# Patient Record
Sex: FEMALE | Race: WHITE | NOT HISPANIC OR LATINO | Employment: FULL TIME | URBAN - METROPOLITAN AREA
[De-identification: names, ages, dates, MRNs, and addresses within clinical notes are randomized per-mention and may not be internally consistent; named-entity substitution may affect disease eponyms.]

---

## 2017-01-05 ENCOUNTER — LAB REQUISITION (OUTPATIENT)
Dept: LAB | Facility: HOSPITAL | Age: 47
End: 2017-01-05
Payer: COMMERCIAL

## 2017-01-05 DIAGNOSIS — E55.9 VITAMIN D DEFICIENCY: ICD-10-CM

## 2017-01-05 LAB — 25(OH)D3 SERPL-MCNC: 50 NG/ML (ref 30–100)

## 2017-01-05 PROCEDURE — 82306 VITAMIN D 25 HYDROXY: CPT | Performed by: INTERNAL MEDICINE

## 2017-01-26 ENCOUNTER — GENERIC CONVERSION - ENCOUNTER (OUTPATIENT)
Dept: OTHER | Facility: OTHER | Age: 47
End: 2017-01-26

## 2017-04-05 ENCOUNTER — ALLSCRIPTS OFFICE VISIT (OUTPATIENT)
Dept: OTHER | Facility: OTHER | Age: 47
End: 2017-04-05

## 2017-04-10 ENCOUNTER — HOSPITAL ENCOUNTER (OUTPATIENT)
Dept: RADIOLOGY | Age: 47
Discharge: HOME | End: 2017-04-10
Payer: COMMERCIAL

## 2017-04-10 DIAGNOSIS — Z12.31 ENCOUNTER FOR SCREENING MAMMOGRAM FOR MALIGNANT NEOPLASM OF BREAST: ICD-10-CM

## 2017-04-10 PROCEDURE — G0202 SCR MAMMO BI INCL CAD: HCPCS

## 2017-05-12 ENCOUNTER — ALLSCRIPTS OFFICE VISIT (OUTPATIENT)
Dept: OTHER | Facility: OTHER | Age: 47
End: 2017-05-12

## 2017-07-07 ENCOUNTER — ALLSCRIPTS OFFICE VISIT (OUTPATIENT)
Dept: OTHER | Facility: OTHER | Age: 47
End: 2017-07-07

## 2017-07-19 RX ORDER — B-COMPLEX WITH VITAMIN C
TABLET ORAL
COMMUNITY
End: 2021-11-23 | Stop reason: CLARIF

## 2017-07-19 RX ORDER — KETOROLAC TROMETHAMINE 10 MG/1
10 TABLET, FILM COATED ORAL EVERY 6 HOURS PRN
COMMUNITY
End: 2017-10-28 | Stop reason: ALTCHOICE

## 2017-07-19 RX ORDER — AZELASTINE 1 MG/ML
1 SPRAY, METERED NASAL 2 TIMES DAILY
COMMUNITY
End: 2019-04-10

## 2017-07-21 ENCOUNTER — LAB REQUISITION (OUTPATIENT)
Dept: LAB | Facility: HOSPITAL | Age: 47
End: 2017-07-21
Payer: COMMERCIAL

## 2017-07-21 DIAGNOSIS — E55.9 VITAMIN D DEFICIENCY: ICD-10-CM

## 2017-07-21 LAB — 25(OH)D3 SERPL-MCNC: 63.6 NG/ML (ref 30–100)

## 2017-07-21 PROCEDURE — 82306 VITAMIN D 25 HYDROXY: CPT | Performed by: INTERNAL MEDICINE

## 2017-08-03 ENCOUNTER — HOSPITAL ENCOUNTER (OUTPATIENT)
Facility: HOSPITAL | Age: 47
Setting detail: OUTPATIENT SURGERY
Discharge: HOME | End: 2017-08-03
Attending: ORTHOPAEDIC SURGERY | Admitting: ORTHOPAEDIC SURGERY
Payer: COMMERCIAL

## 2017-08-03 VITALS
SYSTOLIC BLOOD PRESSURE: 131 MMHG | BODY MASS INDEX: 25.66 KG/M2 | DIASTOLIC BLOOD PRESSURE: 74 MMHG | WEIGHT: 154 LBS | OXYGEN SATURATION: 100 % | HEART RATE: 60 BPM | TEMPERATURE: 98.5 F | RESPIRATION RATE: 16 BRPM | HEIGHT: 65 IN

## 2017-08-03 PROBLEM — M67.442 GANGLION, LEFT HAND: Status: ACTIVE | Noted: 2017-08-03

## 2017-08-03 RX ORDER — HYDROCODONE BITARTRATE AND ACETAMINOPHEN 5; 325 MG/1; MG/1
1 TABLET ORAL EVERY 6 HOURS PRN
Qty: 10 TABLET | Refills: 0 | Status: SHIPPED | OUTPATIENT
Start: 2017-08-03 | End: 2017-10-28 | Stop reason: ALTCHOICE

## 2017-08-03 RX ORDER — MAGNESIUM HYDROXIDE 1200 MG/15ML
LIQUID ORAL AS NEEDED
Status: DISCONTINUED | OUTPATIENT
Start: 2017-08-03 | End: 2017-08-03 | Stop reason: HOSPADM

## 2017-08-03 RX ORDER — LIDOCAINE HYDROCHLORIDE AND EPINEPHRINE 10; 10 MG/ML; UG/ML
INJECTION, SOLUTION INFILTRATION; PERINEURAL AS NEEDED
Status: DISCONTINUED | OUTPATIENT
Start: 2017-08-03 | End: 2017-08-03 | Stop reason: HOSPADM

## 2017-08-03 RX ORDER — LIDOCAINE HYDROCHLORIDE AND EPINEPHRINE 10; 10 MG/ML; UG/ML
10 INJECTION, SOLUTION INFILTRATION; PERINEURAL ONCE
Status: COMPLETED | OUTPATIENT
Start: 2017-08-03 | End: 2017-08-03

## 2017-08-03 RX ADMIN — LIDOCAINE HYDROCHLORIDE,EPINEPHRINE BITARTRATE 10 ML: 10; .01 INJECTION, SOLUTION INFILTRATION; PERINEURAL at 09:46

## 2017-08-11 ENCOUNTER — TRANSCRIBE ORDERS (OUTPATIENT)
Dept: LAB | Facility: CLINIC | Age: 47
End: 2017-08-11

## 2017-08-11 ENCOUNTER — ALLSCRIPTS OFFICE VISIT (OUTPATIENT)
Dept: OTHER | Facility: OTHER | Age: 47
End: 2017-08-11

## 2017-08-11 ENCOUNTER — TRANSCRIBE ORDERS (OUTPATIENT)
Dept: LAB | Facility: HOSPITAL | Age: 47
End: 2017-08-11

## 2017-08-11 ENCOUNTER — APPOINTMENT (OUTPATIENT)
Dept: LAB | Facility: HOSPITAL | Age: 47
End: 2017-08-11
Payer: COMMERCIAL

## 2017-08-11 DIAGNOSIS — R19.7 DIARRHEA, UNSPECIFIED TYPE: Primary | ICD-10-CM

## 2017-08-11 DIAGNOSIS — R19.7 DIARRHEA, UNSPECIFIED TYPE: ICD-10-CM

## 2017-08-11 PROCEDURE — 87015 SPECIMEN INFECT AGNT CONCNTJ: CPT

## 2017-08-11 PROCEDURE — 87045 FECES CULTURE AEROBIC BACT: CPT

## 2017-08-11 PROCEDURE — 87899 AGENT NOS ASSAY W/OPTIC: CPT

## 2017-08-11 PROCEDURE — 87046 STOOL CULTR AEROBIC BACT EA: CPT

## 2017-08-11 PROCEDURE — 87493 C DIFF AMPLIFIED PROBE: CPT

## 2017-08-12 LAB — C DIFF TOX GENS STL QL NAA+PROBE: NORMAL

## 2017-08-13 LAB
BACTERIA STL CULT: NORMAL
BACTERIA STL CULT: NORMAL

## 2017-09-24 ENCOUNTER — TRANSCRIBE ORDERS (OUTPATIENT)
Dept: LAB | Facility: HOSPITAL | Age: 47
End: 2017-09-24

## 2017-09-24 ENCOUNTER — APPOINTMENT (OUTPATIENT)
Dept: LAB | Facility: HOSPITAL | Age: 47
End: 2017-09-24
Attending: INTERNAL MEDICINE
Payer: COMMERCIAL

## 2017-09-24 DIAGNOSIS — R19.7 DIARRHEA, UNSPECIFIED TYPE: ICD-10-CM

## 2017-09-24 DIAGNOSIS — R19.7 DIARRHEA, UNSPECIFIED TYPE: Primary | ICD-10-CM

## 2017-09-24 LAB
HEMOCCULT SP1 STL QL: NEGATIVE
HEMOCCULT SP2 STL QL: NEGATIVE
HEMOCCULT SP3 STL QL: NEGATIVE

## 2017-09-24 PROCEDURE — 36415 COLL VENOUS BLD VENIPUNCTURE: CPT

## 2017-09-24 PROCEDURE — 82705 FATS/LIPIDS FECES QUAL: CPT

## 2017-09-24 PROCEDURE — 83993 ASSAY FOR CALPROTECTIN FECAL: CPT

## 2017-09-24 PROCEDURE — 89055 LEUKOCYTE ASSESSMENT FECAL: CPT

## 2017-09-24 PROCEDURE — 82270 OCCULT BLOOD FECES: CPT

## 2017-09-27 LAB — WBC SPEC QL GRAM STN: NORMAL

## 2017-09-29 LAB
FAT STL QL: NORMAL
NEUTRAL FAT STL QL: NORMAL

## 2017-10-02 LAB — CALPROTECTIN STL-MCNT: <16 UG/G (ref 0–120)

## 2017-10-09 ENCOUNTER — ALLSCRIPTS OFFICE VISIT (OUTPATIENT)
Dept: OTHER | Facility: OTHER | Age: 47
End: 2017-10-09

## 2017-10-09 DIAGNOSIS — Z47.89 ENCOUNTER FOR OTHER ORTHOPEDIC AFTERCARE (CODE): ICD-10-CM

## 2017-10-09 DIAGNOSIS — J30.9 ALLERGIC RHINITIS: ICD-10-CM

## 2017-10-10 NOTE — PROGRESS NOTES
Assessment  1  Aftercare following surgery of the musculoskeletal system (V58 78) (Z47 89)    Plan   · Continue with our present treatment plan ; Status:Complete;   Done: [id number]   · Follow-up visit in 6 weeks Evaluation and Treatment  Follow-up  Status: Complete  Done:  00NBQ4009   · *1 - [facility] Referral Co-Management  Formal therapy 2-3x/wk to work on L  wrist s/p dorsal wrist ganglion excision  Pt with scar tissue that is contributing to extensor  tendon/muscle inflammation of ring and small fingers  Please work on ROM, stretching,  massage, heat and scar tissue management  Please also include therapy for ring finger  trigger finger and nighttime splint (if pt interested)  Please include HEP  Thanks  Status:  Complete  Done: 95VCS1241  Care Summary provided  : Yes    Discussion/Summary    Patient has developed scar tissue over the area of her incision and it does appear to have caused inflammation of her extensor tendons radiating up into the muscle of the ring and small finger  For this, formal therapy was recommended and patient agrees  In addition, patient was found to have the beginnings of a slight trigger finger of the ring finger with a tender nodule  Therapy, steroid injections and surgery were discussed briefly today  At this time, patient would like to 1st initiate therapy  This will be added to her program  Follow up in 6 weeks for re-evaluation  Chief Complaint  1  Wrist Pain    Post-Op  HPI: The past medical history, surgical history, family history, social history, medications, allergies, and review of systems have been read and reviewed on the chart and have been updated  Post-Op UE:   Left side, status post excision of a ganglion cyst on 8/3/17   HPI: The patient reports no fevers,-no chills,-no numbness,-no excessive pain,-no swelling,-no nausea-and-no stiffness   Pt with worsening discomfort into the ring and small fingers, describes pain on dorsal hand/fingers that can radiate up the lateral forearm  Also with pain along the flexor surface of these fingers  Denies clicking catching  PE: The surgical incision site was healed-and-clean, dry and intact  The surgical incision site demonstrates no warmth,-no induration,-no erythema-and-no ecchymosis-  Pt with palpable scar tissue under incision from ganglion removal  This is ttp  Pt able to flex and extend all fingers, but does describe pain with the ring and small fingers  Ttp along extensor tendons of ring/small finger without edema  This continues to the proximal muscle belly of extensors in the forearm  Pt also with a palpable nodule of A1 pulley that is ttp on the ring finger only  Crepitation felt on exam, but no clicking  ROM is as expected and progressing well  2+ radial artery pulse on the operative side  Capillary refill is < 2 seconds Peripheral neurovascular exam reveals sensation intact-and-motor intact  Assessment: Post-op, the patient is progressing slowly, but-has excellent pain control-and-no signs of infection  Preoperative symptoms resolved  Plan: Activity Restrictions: Full use without restrictions  Review of Systems    Constitutional: No fever, no chills, feels well, no tiredness, no recent weight gain or loss  Musculoskeletal: as noted in HPI  Active Problems  1  Acute URI (465 9) (J06 9)   2  Aftercare following surgery of the musculoskeletal system (V58 78) (Z47 89)   3  Allergic rhinitis (477 9) (J30 9)   4  Atypical ductal hyperplasia of breast (610 8) (N60 99)   5  Cervical disc displacement (722 0) (M50 20)   6  Cervical radiculopathy (723 4) (M54 12)   7  Cervicalgia (723 1) (M54 2)   8  Chronic migraine without aura, not intractable, without status migrainosus (346 70)   (G43 709)   9  Common migraine without aura (346 10) (G43 009)   10  Encounter for breast cancer screening other than mammogram (V76 10) (Z12 39)   11   Encounter for screening mammogram for malignant neoplasm of breast (V76 12)    (Z12 31)   12  Hashimoto's thyroiditis (245 2) (E06 3)   13  Headache (784 0) (R51)   14  Headache, migraine, intractable (346 91) (G43 919)   15  Herniated cervical disc (722 0) (M50 20)   16  Hypertension (401 9) (I10)   17  Hypothyroidism (244 9) (E03 9)   18  Kidney stone (592 0) (N20 0)   19  Malignant neoplasm without specification of site (199 1) (C80 1)   20  Myofascial pain syndrome (729 1) (M79 1)   21  Nausea (787 02) (R11 0)   22  Need for influenza vaccination (V04 81) (Z23)   23  Nontoxic single thyroid nodule (241 0) (E04 1)   24  Sinusitis (473 9) (J32 9)   25  Tension type headache (339 10) (G44 209)   26  Thyroiditis (245 9) (E06 9)   27  Thyromegaly (240 9) (E04 9)    Current Meds   1  Aspirin 81 MG TABS; Take 1 tablet daily; Therapy: (Recorded:05Apr2017) to Recorded   2  Azelastine HCl - 0 1 % Nasal Solution; INSTILL 2 SPRAYS IN EACH NOSTRIL ONCE   DAILY; Therapy: 56GEM5045 to (Last Rx:10Nov2016)  Requested for: 02LQT1869 Ordered   3  B2 TABS; Take 1 tablet daily Recorded   4  Co Q-10 100 MG Oral Capsule; Take as directed Recorded   5  Dexamethasone 2 MG Oral Tablet; 1 at the onset of headache, max 5/month; Therapy: 91INA9667 to (Evaluate:17Aug2017)  Requested for: 49ZHP3677; Last   Rx:09Imv2425 Ordered   6  Ketorolac Tromethamine 10 MG Oral Tablet; TID prn, Take at onset of headaches; Therapy: 34YGP3850 to (Evaluate:24Jun2016)  Requested for: 48ILQ8465; Last   Rx:38Hxr3202 Ordered   7  Levothyroxine Sodium 25 MCG Oral Tablet; Take 1 tablet daily; Therapy: 86AQX8737 to (Last Rx:75Hls3001)  Requested for: 10VJG0991 Ordered   8  Magnesium 500 MG Oral Capsule; Take 1 capsule twice daily; Therapy: (Recorded:51Vhd5231) to Recorded   9  Potassium Citrate ER TBCR; 99MG TAKE 1 TAB DAILY; Therapy: (Recorded:03Awo2337) to Recorded   10  Tamoxifen Citrate 20 MG Oral Tablet; TAKE 1 TABLET DAILY; Therapy: 00BOR4868 to Recorded   11   Topiramate 100 MG Oral Tablet; TAKE 1 TABLET AT BEDTIME; Therapy: 31SDF0496 to (Evaluate:02Buc2245)  Requested for: 03Sep2017; Last    Rx:45Ooy7231 Ordered   12  Vitamin D3 CAPS;     Therapy: (Recorded:09Oct2017) to Recorded    Allergies  1  levofloxacin    Vitals  Signs   Heart Rate: 66  Systolic: 164  Diastolic: 83  Height: 5 ft 5 in  Weight: 154 lb 6 oz  BMI Calculated: 25 69  BSA Calculated: 1 77    Future Appointments    Date/Time Provider Specialty Site   11/20/2017 03:40 PM Champ Ndiaye [facility] Orthopedic Surgery [facility]   04/05/2018 09:30 AM Felipe Marte [facility] Neurology [facility] [place] [facility]     Signatures   Electronically signed by : Ida Mccoy [facility]; Oct  9 2017 11:09AM EST                       (Author)    Electronically signed by : ALBERTO Castro ; Oct  9 2017  4:26PM EST

## 2017-10-17 ENCOUNTER — APPOINTMENT (OUTPATIENT)
Dept: OCCUPATIONAL THERAPY | Facility: CLINIC | Age: 47
End: 2017-10-17
Payer: COMMERCIAL

## 2017-10-17 DIAGNOSIS — Z47.89 ENCOUNTER FOR OTHER ORTHOPEDIC AFTERCARE (CODE): ICD-10-CM

## 2017-10-17 DIAGNOSIS — J30.9 ALLERGIC RHINITIS: ICD-10-CM

## 2017-10-17 PROCEDURE — 97140 MANUAL THERAPY 1/> REGIONS: CPT

## 2017-10-17 PROCEDURE — G8991 OTHER PT/OT GOAL STATUS: HCPCS

## 2017-10-17 PROCEDURE — 97110 THERAPEUTIC EXERCISES: CPT

## 2017-10-17 PROCEDURE — 97165 OT EVAL LOW COMPLEX 30 MIN: CPT

## 2017-10-17 PROCEDURE — G8990 OTHER PT/OT CURRENT STATUS: HCPCS

## 2017-10-23 ENCOUNTER — APPOINTMENT (OUTPATIENT)
Dept: OCCUPATIONAL THERAPY | Facility: CLINIC | Age: 47
End: 2017-10-23
Payer: COMMERCIAL

## 2017-10-23 PROCEDURE — 97010 HOT OR COLD PACKS THERAPY: CPT

## 2017-10-23 PROCEDURE — 97110 THERAPEUTIC EXERCISES: CPT

## 2017-10-23 PROCEDURE — 97140 MANUAL THERAPY 1/> REGIONS: CPT

## 2017-10-24 ENCOUNTER — ALLSCRIPTS OFFICE VISIT (OUTPATIENT)
Dept: OTHER | Facility: OTHER | Age: 47
End: 2017-10-24

## 2017-10-25 ENCOUNTER — APPOINTMENT (OUTPATIENT)
Dept: OCCUPATIONAL THERAPY | Facility: CLINIC | Age: 47
End: 2017-10-25
Payer: COMMERCIAL

## 2017-10-25 PROCEDURE — 97110 THERAPEUTIC EXERCISES: CPT

## 2017-10-25 PROCEDURE — 97140 MANUAL THERAPY 1/> REGIONS: CPT

## 2017-10-25 NOTE — PROGRESS NOTES
Assessment  1  Infected pierced ear (958 3) (S01 339A,L08 9)    Plan  Headache, migraine, intractable    · Dexamethasone 2 MG Oral Tablet; 1 at the onset of headache, max 5/month  Infected pierced ear    · Cephalexin 500 MG Oral Capsule; TAKE 1 CAPSULE 3 TIMES DAILY   · PredniSONE 5 MG Oral Tablet; TAKE 3 TABLET Twice daily for 2 days,t then 2 bid  for two days, 1 bid for 2 days,then 1 daily for 2 days and stop    Discussion/Summary    If patient isn't feeling better in 72 hours she is to call  She's to continue using peroxide on the area cleansed  I have advised that she doesn't try to put the piercing back into the ear once it heals  Possible side effects of new medications were reviewed with the patient/guardian today  The treatment plan was reviewed with the patient/guardian  The patient/guardian understands and agrees with the treatment plan      Chief Complaint  c/o infected piercing L ear      History of Present Illness  HPI: The patient has an infected piercing on palpation of her left ear  She went to a walk-in Center was given doxycycline on Sunday but is not feeling any better and still has yellow purulent drainage from the site and tenderness at the current time  Review of Systems    Constitutional: No fever, no chills, feels well, no tiredness, no recent weight gain or loss  ENT: Infected piercing of the left ear with purulent discharge and is very sore, but-- no ear ache, no loss of hearing, no nosebleeds or nasal discharge, no sore throat or hoarseness  Cardiovascular: no complaints of slow or fast heart rate, no chest pain, no palpitations, no leg claudication or lower extremity edema  Respiratory: no complaints of shortness of breath, no wheezing, no dyspnea on exertion, no orthopnea or PND  Breasts: no complaints of breast pain, breast lump or nipple discharge  Gastrointestinal: no complaints of abdominal pain, no constipation, no nausea or diarrhea, no vomiting, no bloody stools  Genitourinary: no complaints of dysuria, no incontinence, no pelvic pain, no dysmenorrhea, no vaginal discharge or abnormal vaginal bleeding  Musculoskeletal: no complaints of arthralgia, no myalgia, no joint swelling or stiffness, no limb pain or swelling  Integumentary: no complaints of skin rash or lesion, no itching or dry skin, no skin wounds  Neurological: no complaints of headache, no confusion, no numbness or tingling, no dizziness or fainting  Active Problems  1  Acute URI (465 9) (J06 9)   2  Aftercare following surgery of the musculoskeletal system (V58 78) (Z47 89)   3  Allergic rhinitis (477 9) (J30 9)   4  Atypical ductal hyperplasia of breast (610 8) (N60 99)   5  Cervical disc displacement (722 0) (M50 20)   6  Cervical radiculopathy (723 4) (M54 12)   7  Cervicalgia (723 1) (M54 2)   8  Chronic migraine without aura, not intractable, without status migrainosus (346 70)   (G43 709)   9  Common migraine without aura (346 10) (G43 009)   10  Encounter for breast cancer screening other than mammogram (V76 10) (Z12 39)   11  Encounter for screening mammogram for malignant neoplasm of breast (V76 12)    (Z12 31)   12  Hashimoto's thyroiditis (245 2) (E06 3)   13  Headache (784 0) (R51)   14  Headache, migraine, intractable (346 91) (G43 919)   15  Herniated cervical disc (722 0) (M50 20)   16  Hypertension (401 9) (I10)   17  Hypothyroidism (244 9) (E03 9)   18  Kidney stone (592 0) (N20 0)   19  Malignant neoplasm without specification of site (199 1) (C80 1)   20  Myofascial pain syndrome (729 1) (M79 1)   21  Nausea (787 02) (R11 0)   22  Need for influenza vaccination (V04 81) (Z23)   23  Nontoxic single thyroid nodule (241 0) (E04 1)   24  Sinusitis (473 9) (J32 9)   25  Tension type headache (339 10) (G44 209)   26  Thyroiditis (245 9) (E06 9)   27  Thyromegaly (240 9) (E04 9)    Past Medical History  1  History of Chronic headaches (784 0) (R51)   2   History of Depression (311) (F32 9)   3  History of Ganglion cyst of finger of left hand (727 43) (M67 442)   4  History of thyroid disease (V12 29) (Z86 39)   5  Hypertension (401 9) (I10)  Active Problems And Past Medical History Reviewed: The active problems and past medical history were reviewed and updated today  Family History  Mother    1  Family history of Bladder Surgery   2  Family history of Hypertension (V17 49)  Father    3  Family history of Diabetes Mellitus (V18 0)   4  Family history of Hypertension (V17 49)  Sister    5  Family history of Leukemia (V16 6)  Maternal Grandmother    6  Family history of Bladder Cancer (V16 52)   7  Family history of Maternal Grandmother Is   Paternal Grandmother    6  Family history of Leukemia (V16 6)   9  Family history of Paternal Grandmother Is   Maternal Grandfather    8  Family history of Dementia   11  Family history of Maternal Grandfather Is   Paternal Grandfather    15  Family history of Paternal Grandfather Is   Family History    15  Family history of Bladder Cancer (V16 52)   14  Family history of Diabetes Mellitus (V18 0)   15  Family history of Hypertension (V17 49)  Family History Reviewed: The family history was reviewed and updated today  Social History   · Being A Social Drinker   · Denied: History of Daily Coffee Consumption (___ Cups/Day)   · Daily Cola Consumption (___ Cans/Day)   · Daily Tea Consumption (___ Cups/Day)   · Denied: History of Drug Use (305 90)   · Marital History - Currently    · Never A Smoker  The social history was reviewed and updated today  The social history was reviewed and is unchanged  Surgical History  1  History of Wrist Surgery  Surgical History Reviewed: The surgical history was reviewed and updated today  Current Meds   1  Aspirin 81 MG TABS; Take 1 tablet daily; Therapy: (Recorded:2017) to Recorded   2   Azelastine HCl - 0 1 % Nasal Solution; INSTILL 2 SPRAYS IN [facility] NOSTRIL ONCE   DAILY; Therapy: 67HQC9345 to (Last Rx:10Nov2016)  Requested for: 87UNP1186 Ordered   3  B2 TABS; Take 1 tablet daily Recorded   4  Co Q-10 100 MG Oral Capsule; Take as directed Recorded   5  Dexamethasone 2 MG Oral Tablet; 1 at the onset of headache, max 5/month; Therapy: 59ZRU1098 to (Evaluate:17Aug2017)  Requested for: 49XLC1218; Last   Rx:62Ali4446 Ordered   6  Ketorolac Tromethamine 10 MG Oral Tablet; TID prn, Take at onset of headaches; Therapy: 71FMG2394 to (Evaluate:24Jun2016)  Requested for: 67OJG3764; Last   Rx:72Bkx4355 Ordered   7  Levothyroxine Sodium 25 MCG Oral Tablet; Take 1 tablet daily; Therapy: 36IMX9621 to (Last Rx:57Gsq9868)  Requested for: 54MEK6401 Ordered   8  Magnesium 500 MG Oral Capsule; Take 1 capsule twice daily; Therapy: (Recorded:23Het2724) to Recorded   9  Potassium Citrate ER TBCR; 99MG TAKE 1 TAB DAILY; Therapy: (Recorded:75Tms5249) to Recorded   10  Tamoxifen Citrate 20 MG Oral Tablet; TAKE 1 TABLET DAILY; Therapy: 39HFX6270 to Recorded   11  Topiramate 100 MG Oral Tablet; TAKE 1 TABLET AT BEDTIME; Therapy: 87BVU0856 to (Evaluate:19Dgn4582)  Requested for: 05Bpl6567; Last    Rx:99Iiy0405 Ordered   12  Vitamin D3 CAPS; Therapy: (Recorded:09Oct2017) to Recorded    The medication list was reviewed and updated today  Allergies  1  levofloxacin    Vitals   Recorded: 86FXF6653 05:18PM   Temperature 98 F   Heart Rate 60   Systolic 011   Diastolic 82   Height 5 ft 5 in   Weight 156 lb 8 oz   BMI Calculated 26 04   BSA Calculated 1 78     Physical Exam    Constitutional   General appearance: No acute distress, well appearing and well nourished  Eyes   Conjunctiva and lids: No swelling, erythema or discharge  Pupils and irises: Equal, round and reactive to light      Ears, Nose, Mouth, and Throat   External inspection of ears and nose: Normal     Otoscopic examination: Abnormal  -- Penicillin left ear is erythematous with some purulent drainage from the site  Nasal mucosa, septum, and turbinates: Normal without edema or erythema  Oropharynx: Normal with no erythema, edema, exudate or lesions  Pulmonary   Respiratory effort: No increased work of breathing or signs of respiratory distress  Auscultation of lungs: Clear to auscultation  Cardiovascular   Auscultation of heart: Normal rate and rhythm, normal S1 and S2, without murmurs  Examination of extremities for edema and/or varicosities: Normal     Abdomen   Abdomen: Non-tender, no masses  Liver and spleen: No hepatomegaly or splenomegaly  Lymphatic   Palpation of lymph nodes in neck: Abnormal  -- Preauricular lymphadenopathy left ear  Musculoskeletal   Digits and nails: Normal without clubbing or cyanosis  Neurologic   Reflexes: 2+ and symmetric      Psychiatric   Orientation to person, place, and time: Normal     Mood and affect: Normal          Future Appointments    Date/Time Provider Specialty Site   11/20/2017 03:40 PM Makenzie Guerra [facility] Orthopedic Surgery [facility]   04/05/2018 09:30 AM Kelly Arevalo [facility] Neurology [facility] [place] [facility]     Signatures   Electronically signed by : Shannan Mackey DO; Oct 24 2017  5:38PM EST                       (Author)

## 2017-10-28 ENCOUNTER — HOSPITAL ENCOUNTER (EMERGENCY)
Facility: HOSPITAL | Age: 47
Discharge: HOME | End: 2017-10-28
Attending: EMERGENCY MEDICINE | Admitting: EMERGENCY MEDICINE
Payer: COMMERCIAL

## 2017-10-28 VITALS
TEMPERATURE: 98.3 F | DIASTOLIC BLOOD PRESSURE: 80 MMHG | BODY MASS INDEX: 26.19 KG/M2 | SYSTOLIC BLOOD PRESSURE: 133 MMHG | HEIGHT: 65 IN | RESPIRATION RATE: 18 BRPM | OXYGEN SATURATION: 100 % | HEART RATE: 64 BPM | WEIGHT: 157.19 LBS

## 2017-10-28 DIAGNOSIS — L03.90 CELLULITIS: Primary | ICD-10-CM

## 2017-10-28 PROCEDURE — 99282 EMERGENCY DEPT VISIT SF MDM: CPT

## 2017-10-28 RX ORDER — CLINDAMYCIN HYDROCHLORIDE 300 MG/1
300 CAPSULE ORAL 4 TIMES DAILY
Qty: 20 CAPSULE | Refills: 0 | Status: SHIPPED | OUTPATIENT
Start: 2017-10-28 | End: 2017-11-07

## 2017-10-28 RX ORDER — PREDNISONE 1 MG/1
5 TABLET ORAL DAILY
COMMUNITY
End: 2019-03-13 | Stop reason: SDUPTHER

## 2017-10-28 RX ORDER — CLINDAMYCIN HYDROCHLORIDE 300 MG/1
300 CAPSULE ORAL 2 TIMES DAILY
COMMUNITY
End: 2019-04-10

## 2017-10-28 NOTE — ED PROVIDER NOTES
History  Chief Complaint   Patient presents with    Ear Problem     Reports infection with left ear piercing  Reports having antibiotics changed 3 times for infection  This 53-year-old white female presents emergency room for evaluation of her left ear  She had a piercing done which got secondarily infected  She was initially treated with doxycycline which was then changed to Keflex which was then changed to clindamycin yesterday  She was placed on 300 mg of clindamycin twice a day  She has only taken 3 tablets  She presents because her ear started to drain last night  Her  decompressed a lot of green and past that was cleaned  Patient denies any fever, chills, generalized fatigue or weakness  She denies any myalgias  She denies any nausea, vomiting, diarrhea  She denies any shaking chills or night sweats  She denies any upper respiratory symptoms  She remove the earring from the  site  History provided by:  Patient  Ear Drainage   Location:  Left external ear  Severity:  Moderate  Onset quality:  Sudden  Duration:  2 days  Timing:  Intermittent  Progression:  Unchanged  Chronicity:  New  Context:  Patient has had a external ear infection and was initially treated with doxycycline which was switched to Keflex which was then switched to clindamycin yesterday  Patient has taken 3 doses of the clindamycin with no improvement  Her ear did drain last night and her  got a lot  Relieved by:  Clindamycin and prednisone  Worsened by:  Palpation  Associated symptoms: ear pain    Associated symptoms: no abdominal pain ( and inflammation ), no chest pain, no congestion, no cough, no diarrhea, no fatigue (  doxycycline and Keflex), no fever, no headaches, no loss of consciousness, no myalgias, no nausea, no rash, no rhinorrhea, no sore throat, no vomiting and no wheezing        Prior to Admission Medications   Prescriptions Last Dose Informant Patient Reported? Taking?    POTASSIUM CITRATE ER PO   Yes No   Sig: Take 99 mg by mouth daily   Riboflavin (VITAMIN B-2) 25 MG TABS   Yes No   Sig: Take by mouth   aspirin 81 MG tablet   Yes No   Sig: Take 81 mg by mouth daily  azelastine (ASTELIN) 0 1 % nasal spray   Yes No   Si spray into each nostril 2 (two) times a day Use in each nostril as needed    clindamycin (CLEOCIN) 300 MG capsule   Yes Yes   Sig: Take 300 mg by mouth 2 (two) times a day   co-enzyme Q-10 50 MG capsule   Yes No   Sig: Take 100 mg by mouth daily  levothyroxine 25 mcg tablet   Yes No   Sig: Take 25 mcg by mouth daily  magnesium gluconate (MAGONATE) 500 mg tablet   Yes No   Sig: Take 500 mg by mouth 2 (two) times a day  predniSONE 1 mg tablet   Yes Yes   Sig: Take 5 mg by mouth daily   tamoxifen (NOLVADEX) 20 mg tablet   Yes No   Sig: Take 20 mg by mouth 2 (two) times a day  topiramate (TOPAMAX) 100 mg tablet   Yes No   Sig: Take 150 mg by mouth 2 (two) times a day Indications: 50mg AM  100 mg PM       Facility-Administered Medications: None       Past Medical History:   Diagnosis Date    Cervicalgia     disc displacement,radiculopathy    Disease of thyroid gland     Kidney stone     Migraine     Myofascial pain syndrome        Past Surgical History:   Procedure Laterality Date    BREAST BIOPSY Right     biospy w/ marker    CARPAL TUNNEL RELEASE Right     GANGLION CYST EXCISION Left 8/3/2017    Procedure: WRIST/HAND MASS EXCISION;  Surgeon: Molina Galvan MD;  Location: [facility];  Service: Orthopedics    HYSTERECTOMY         Family History   Problem Relation Age of Onset    Hypertension Mother     Diabetes Father     Hypertension Father      I have reviewed and agree with the history as documented  Social History   Substance Use Topics    Smoking status: Never Smoker    Smokeless tobacco: Never Used    Alcohol use No        Review of Systems   Constitutional: Positive for activity change   Negative for appetite change, chills, fatigue ( doxycycline and Keflex) and fever  HENT: Positive for ear discharge and ear pain  Negative for congestion, dental problem, facial swelling, mouth sores, postnasal drip, rhinorrhea, sneezing and sore throat  Eyes: Negative for discharge, redness and visual disturbance  Respiratory: Negative for cough and wheezing  Cardiovascular: Negative for chest pain  Gastrointestinal: Negative for abdominal pain ( and inflammation ), diarrhea, nausea and vomiting  Musculoskeletal: Negative for myalgias  Skin: Positive for color change  Negative for pallor, rash and wound  Neurological: Negative for loss of consciousness and headaches  All other systems reviewed and are negative  Physical Exam  ED Triage Vitals [10/28/17 1009]   Temperature Pulse Respirations Blood Pressure SpO2   98 3 °F (36 8 °C) 64 18 133/80 100 %      Temp Source Heart Rate Source Patient Position - Orthostatic VS BP Location FiO2 (%)   Oral Monitor Sitting Right arm --      Pain Score       8           Orthostatic Vital Signs  Vitals:    10/28/17 1009   BP: 133/80   Pulse: 64   Patient Position - Orthostatic VS: Sitting       Physical Exam   Constitutional: She is oriented to person, place, and time  She appears well-developed and well-nourished  No distress  HENT:   Head: Normocephalic  Right Ear: External ear normal    Nose: Nose normal    Mouth/Throat: Oropharynx is clear and moist  No oropharyngeal exudate  Left external ear-there is erythema present over the here at the location of a previous piercing  There is no evidence of an abscess  The area is tender upon palpation  Eyes: Conjunctivae are normal  Right eye exhibits no discharge  Left eye exhibits no discharge  No scleral icterus  Neck: Neck supple  Cardiovascular: Normal rate, regular rhythm and normal heart sounds  No murmur heard  Pulmonary/Chest: Effort normal and breath sounds normal  No respiratory distress  She has no wheezes  She has no rales  Lymphadenopathy:     She has cervical adenopathy  Neurological: She is alert and oriented to person, place, and time  Skin: Capillary refill takes less than 2 seconds  She is not diaphoretic  There is erythema present over the external ear  The external canal is not erythematous and there is no edema present  The tympanic membrane is clear  There is no evidence of an abscess or fluctuance at this time  Psychiatric: She has a normal mood and affect  Her behavior is normal  Judgment and thought content normal    Nursing note and vitals reviewed  ED Medications  Medications - No data to display    Diagnostic Studies  Results Reviewed     None                 No orders to display              Procedures  Procedures       Phone Contacts  ED Phone Contact    ED Course  ED Course                                MDM  Number of Diagnoses or Management Options  Cellulitis: new and requires workup  Risk of Complications, Morbidity, and/or Mortality  Presenting problems: low  Diagnostic procedures: low  Management options: low  General comments: Patient presents to the emergency room with redness on the external aspect of her left ear  This is a erythema secondary to recent piercing  Patient has removed the piercing from her ear  She was seen by her primary care provider who initially started her on doxycycline, then changed to Keflex, yesterday, she started her on clindamycin 300 mg twice daily  Patient states that last evening the area opened and drained thick green past   She denies any fever or chills  She denies any nausea or vomiting, diarrhea, abdominal pain  Her physical exam demonstrates local erythema at the site  There is no evidence of abscess  She is to continue the clindamycin  I did increase the dose to 300 mg 4 times a day for the next 10 days  She will use a probiotic or yogurt to help with the GI upset  She will return if her symptoms worsen    She was discharged home and instructed to follow up with her primary care provider as needed  If her symptoms worsen we will see her back in the emergency room at any time  Patient Progress  Patient progress: stable    CritCare Time    Disposition  Final diagnoses:   Cellulitis - Acute cellulitis of the external auricle left ear     Time reflects when diagnosis was documented in both MDM as applicable and the Disposition within this note     Time User Action Codes Description Comment    10/28/2017 10:30 AM dIa Montesist Add [L03 90] Cellulitis     10/28/2017 10:30 AM Ida Montesist Modify [L03 90] Cellulitis Acute cellulitis of the external auricle left ear      ED Disposition     ED Disposition Condition Comment    Discharge  Kevin Licona discharge to home/self care  Condition at discharge: Good        Follow-up Information     Follow up With Specialties Details Why Contact Info Additional Information    [facility]  As needed [address] [id number] [phone],  [address], [place], [address]        Discharge Medication List as of 10/28/2017 10:34 AM      START taking these medications    Details   !! clindamycin (CLEOCIN) 300 MG capsule Take 1 capsule by mouth 4 (four) times a day for 10 days, Starting Sat 10/28/2017, Until Tue 11/7/2017, Print       !! - Potential duplicate medications found  Please discuss with provider  CONTINUE these medications which have NOT CHANGED    Details   aspirin 81 MG tablet Take 81 mg by mouth daily  , Until Discontinued, Historical Med      azelastine (ASTELIN) 0 1 % nasal spray 1 spray into each nostril 2 (two) times a day Use in each nostril as needed , Historical Med      !! clindamycin (CLEOCIN) 300 MG capsule Take 300 mg by mouth 2 (two) times a day, Historical Med      co-enzyme Q-10 50 MG capsule Take 100 mg by mouth daily  , Until Discontinued, Historical Med      levothyroxine 25 mcg tablet Take 25 mcg by mouth daily  , Until Discontinued, Historical Med      magnesium gluconate (MAGONATE) 500 mg tablet Take 500 mg by mouth 2 (two) times a day , Until Discontinued, Historical Med      POTASSIUM CITRATE ER PO Take 99 mg by mouth daily, Historical Med      predniSONE 1 mg tablet Take 5 mg by mouth daily, Historical Med      Riboflavin (VITAMIN B-2) 25 MG TABS Take by mouth, Historical Med      tamoxifen (NOLVADEX) 20 mg tablet Take 20 mg by mouth 2 (two) times a day , Until Discontinued, Historical Med      topiramate (TOPAMAX) 100 mg tablet Take 150 mg by mouth 2 (two) times a day Indications: 50mg AM  100 mg PM , Until Discontinued, Historical Med       !! - Potential duplicate medications found  Please discuss with provider  No discharge procedures on file      ED Provider  Electronically Signed by           Zackery Garcia PA-C  10/28/17 9485

## 2017-10-28 NOTE — DISCHARGE INSTRUCTIONS
Cellulitis, Ambulatory Care   GENERAL INFORMATION:   Cellulitis  is a skin infection caused by bacteria  Common symptoms include the following:   · Fever    · A red, warm, swollen area on your skin    · Pain when the area is touched    · Bumps or blisters (abscess) that may drain pus    · Bumpy, raised skin that feels like an orange peel  Seek immediate care for the following symptoms:   · An increase in pain, redness, warmth, and size    · Red streaks coming from the infected area    · A thin, gray-brown discharge coming from your infected skin area    · A crackling under your skin when you touch it    · Purple dots or bumps on your skin, or bleeding under your skin    · New swelling and pain in your legs    · Sudden trouble breathing or chest pain  Treatment for cellulitis  may include medicines to treat the bacterial infection or decrease pain  The infection may need to be cleaned out  Damaged, dead, or infected tissue may need to be cut away to help your wound heal   Manage your symptoms:   · Elevate your wound above the level of your heart  as often as you can  This will help decrease swelling and pain  Prop your wound on pillows or blankets to keep it elevated comfortably  · Clean your wound as directed  You may need to wash the wound with soap and water  Look for signs of infection  · Wear pressure stockings as directed  The stockings are tight and put pressure on your legs  This improves blood flow and decreases swelling  Prevent cellulitis:   · Wash your hands often  Use soap and water  Wash your hands after you use the bathroom, change a child's diapers, or sneeze  Wash your hands before you prepare or eat food  Use lotion to prevent dry, cracked skin  · Do not share personal items, such as towels, clothing, and razors  · Clean exercise equipment  with germ-killing  before and after you use it    Follow up with your healthcare provider as directed:  Write down your questions so you remember to ask them during your visits  CARE AGREEMENT:   You have the right to help plan your care  Learn about your health condition and how it may be treated  Discuss treatment options with your caregivers to decide what care you want to receive  You always have the right to refuse treatment  The above information is an  only  It is not intended as medical advice for individual conditions or treatments  Talk to your doctor, nurse or pharmacist before following any medical regimen to see if it is safe and effective for you  © 2014 [address] is for End User's use only and may not be sold, redistributed or otherwise used for commercial purposes  All illustrations and images included in CareNotes® are the copyrighted property of [employer]  or [employer]  Increased her clindamycin to 4 times a day for the next 10 days  Your received a prescription to continue therapy  Please apply warm compresses to the area for 20 minutes 3 to 4 times a day  Return to the emergency room if her symptoms worsen, increased redness, drainage, fever greater than 100 4 degrees F, shaking chills, night sweats, increasing fatigue or  Pain

## 2017-10-30 ENCOUNTER — APPOINTMENT (OUTPATIENT)
Dept: OCCUPATIONAL THERAPY | Facility: CLINIC | Age: 47
End: 2017-10-30
Payer: COMMERCIAL

## 2017-10-30 PROCEDURE — 97110 THERAPEUTIC EXERCISES: CPT

## 2017-10-30 PROCEDURE — 97010 HOT OR COLD PACKS THERAPY: CPT

## 2017-10-30 PROCEDURE — 97018 PARAFFIN BATH THERAPY: CPT

## 2017-11-01 ENCOUNTER — APPOINTMENT (OUTPATIENT)
Dept: OCCUPATIONAL THERAPY | Facility: CLINIC | Age: 47
End: 2017-11-01
Payer: COMMERCIAL

## 2017-11-01 PROCEDURE — 97140 MANUAL THERAPY 1/> REGIONS: CPT

## 2017-11-01 PROCEDURE — 97150 GROUP THERAPEUTIC PROCEDURES: CPT

## 2017-11-02 ENCOUNTER — GENERIC CONVERSION - ENCOUNTER (OUTPATIENT)
Dept: OTHER | Facility: OTHER | Age: 47
End: 2017-11-02

## 2017-11-03 ENCOUNTER — LAB REQUISITION (OUTPATIENT)
Dept: LAB | Facility: HOSPITAL | Age: 47
End: 2017-11-03
Payer: COMMERCIAL

## 2017-11-03 DIAGNOSIS — H60.332 SWIMMER'S EAR OF LEFT SIDE: ICD-10-CM

## 2017-11-03 PROCEDURE — 87077 CULTURE AEROBIC IDENTIFY: CPT | Performed by: PHYSICIAN ASSISTANT

## 2017-11-03 PROCEDURE — 87186 SC STD MICRODIL/AGAR DIL: CPT | Performed by: PHYSICIAN ASSISTANT

## 2017-11-03 PROCEDURE — 87070 CULTURE OTHR SPECIMN AEROBIC: CPT | Performed by: PHYSICIAN ASSISTANT

## 2017-11-03 PROCEDURE — 87205 SMEAR GRAM STAIN: CPT | Performed by: PHYSICIAN ASSISTANT

## 2017-11-05 LAB
BACTERIA WND AEROBE CULT: ABNORMAL
GRAM STN SPEC: ABNORMAL
GRAM STN SPEC: ABNORMAL

## 2017-11-06 ENCOUNTER — APPOINTMENT (OUTPATIENT)
Dept: OCCUPATIONAL THERAPY | Facility: CLINIC | Age: 47
End: 2017-11-06
Payer: COMMERCIAL

## 2017-11-06 PROCEDURE — 97010 HOT OR COLD PACKS THERAPY: CPT

## 2017-11-06 PROCEDURE — 97140 MANUAL THERAPY 1/> REGIONS: CPT

## 2017-11-06 PROCEDURE — 97110 THERAPEUTIC EXERCISES: CPT

## 2017-11-09 ENCOUNTER — APPOINTMENT (OUTPATIENT)
Dept: OCCUPATIONAL THERAPY | Facility: CLINIC | Age: 47
End: 2017-11-09
Payer: COMMERCIAL

## 2017-11-09 PROCEDURE — G8991 OTHER PT/OT GOAL STATUS: HCPCS

## 2017-11-09 PROCEDURE — 97140 MANUAL THERAPY 1/> REGIONS: CPT

## 2017-11-09 PROCEDURE — G8990 OTHER PT/OT CURRENT STATUS: HCPCS

## 2017-11-09 PROCEDURE — 97010 HOT OR COLD PACKS THERAPY: CPT

## 2017-11-09 PROCEDURE — 97110 THERAPEUTIC EXERCISES: CPT

## 2017-11-12 ENCOUNTER — APPOINTMENT (EMERGENCY)
Dept: CT IMAGING | Facility: HOSPITAL | Age: 47
End: 2017-11-12
Payer: COMMERCIAL

## 2017-11-12 ENCOUNTER — HOSPITAL ENCOUNTER (EMERGENCY)
Facility: HOSPITAL | Age: 47
Discharge: HOME | End: 2017-11-13
Attending: EMERGENCY MEDICINE | Admitting: EMERGENCY MEDICINE
Payer: COMMERCIAL

## 2017-11-12 VITALS
OXYGEN SATURATION: 100 % | RESPIRATION RATE: 18 BRPM | WEIGHT: 161.16 LBS | BODY MASS INDEX: 26.82 KG/M2 | TEMPERATURE: 98.4 F | SYSTOLIC BLOOD PRESSURE: 121 MMHG | HEART RATE: 71 BPM | DIASTOLIC BLOOD PRESSURE: 81 MMHG

## 2017-11-12 DIAGNOSIS — H60.02 ABSCESS OF LEFT EXTERNAL EAR: Primary | ICD-10-CM

## 2017-11-12 PROCEDURE — 70480 CT ORBIT/EAR/FOSSA W/O DYE: CPT

## 2017-11-12 RX ORDER — PENICILLIN V POTASSIUM 500 MG/1
500 TABLET ORAL 4 TIMES DAILY
Qty: 28 TABLET | Refills: 0 | Status: SHIPPED | OUTPATIENT
Start: 2017-11-12 | End: 2017-11-12

## 2017-11-12 RX ORDER — PREDNISONE 20 MG/1
TABLET ORAL
Qty: 36 TABLET | Refills: 0 | Status: SHIPPED | OUTPATIENT
Start: 2017-11-12 | End: 2017-11-12

## 2017-11-12 RX ORDER — MORPHINE SULFATE 15 MG/1
15 TABLET ORAL EVERY 4 HOURS PRN
Status: DISCONTINUED | OUTPATIENT
Start: 2017-11-12 | End: 2017-11-13 | Stop reason: HOSPADM

## 2017-11-12 RX ORDER — LIDOCAINE HYDROCHLORIDE 10 MG/ML
10 INJECTION, SOLUTION EPIDURAL; INFILTRATION; INTRACAUDAL; PERINEURAL ONCE
Status: COMPLETED | OUTPATIENT
Start: 2017-11-12 | End: 2017-11-12

## 2017-11-12 RX ADMIN — MORPHINE SULFATE 15 MG: 15 TABLET ORAL at 22:53

## 2017-11-12 RX ADMIN — LIDOCAINE HYDROCHLORIDE 10 ML: 10 INJECTION, SOLUTION EPIDURAL; INFILTRATION; INTRACAUDAL; PERINEURAL at 21:19

## 2017-11-13 ENCOUNTER — GENERIC CONVERSION - ENCOUNTER (OUTPATIENT)
Dept: OTHER | Facility: OTHER | Age: 47
End: 2017-11-13

## 2017-11-13 PROCEDURE — 99284 EMERGENCY DEPT VISIT MOD MDM: CPT

## 2017-11-13 RX ORDER — MORPHINE SULFATE 15 MG/1
15 TABLET ORAL EVERY 4 HOURS PRN
Qty: 15 TABLET | Refills: 0 | Status: SHIPPED | OUTPATIENT
Start: 2017-11-13 | End: 2019-04-10

## 2017-11-13 NOTE — DISCHARGE INSTRUCTIONS
Abscess   WHAT YOU NEED TO KNOW:   A warm compress may help your abscess drain  Your healthcare provider may make a cut in the abscess so it can drain  You may need surgery to remove an abscess that is on your hands or buttocks  DISCHARGE INSTRUCTIONS:   Return to the emergency department if:   · The area around your abscess becomes very painful, warm, or has red streaks  · You have a fever and chills  · Your heart is beating faster than usual      · You feel faint or confused  Contact your healthcare provider if:   · Your abscess gets bigger or does not get better  · Your abscess returns  · You have questions or concerns about your condition or care  Medicines: You may  need any of the following:  · Antibiotics  help treat a bacterial infection  · Acetaminophen  decreases pain and fever  It is available without a doctor's order  Ask how much to take and how often to take it  Follow directions  Acetaminophen can cause liver damage if not taken correctly  · NSAIDs , such as ibuprofen, help decrease swelling, pain, and fever  This medicine is available with or without a doctor's order  NSAIDs can cause stomach bleeding or kidney problems in certain people  If you take blood thinner medicine, always ask your healthcare provider if NSAIDs are safe for you  Always read the medicine label and follow directions  · Take your medicine as directed  Contact your healthcare provider if you think your medicine is not helping or if you have side effects  Tell him or her if you are allergic to any medicine  Keep a list of the medicines, vitamins, and herbs you take  Include the amounts, and when and why you take them  Bring the list or the pill bottles to follow-up visits  Carry your medicine list with you in case of an emergency  Self-care:   · Apply a warm compress to your abscess  This will help it open and drain  Wet a washcloth in warm, but not hot, water  Apply the compress for 10 minutes  Repeat this 4 times each day  Do not  press on an abscess or try to open it with a needle  You may push the bacteria deeper or into your blood  · Do not share your clothes, towels, or sheets with anyone  This can spread the infection to others  · Wash your hands often  This can help prevent the spread of germs  Use soap and water or an alcohol-based hand rub  Care for your wound after it is drained:   · Care for your wound as directed  If your healthcare provider says it is okay, carefully remove the bandage and gauze packing  You may need to soak the gauze to get it out of your wound  Clean your wound and the area around it as directed  Dry the area and put on new, clean bandages  Change your bandages when they get wet or dirty  · Ask your healthcare provider how to change the gauze in your wound  Keep track of how many pieces of gauze are placed inside the wound  Do not put too much packing in the wound  Do not pack the gauze too tightly in your wound  Follow up with your healthcare provider in 1 to 3 days: You may need to have your packing removed or your bandage changed  Write down your questions so you remember to ask them during your visits  © 2017 [address]  Information is for End User's use only and may not be sold, redistributed or otherwise used for commercial purposes  All illustrations and images included in CareNotes® are the copyrighted property of [employer]  or [employer]  The above information is an  only  It is not intended as medical advice for individual conditions or treatments  Talk to your doctor, nurse or pharmacist before following any medical regimen to see if it is safe and effective for you

## 2017-11-14 ENCOUNTER — APPOINTMENT (OUTPATIENT)
Dept: LAB | Facility: CLINIC | Age: 47
End: 2017-11-14
Payer: COMMERCIAL

## 2017-11-14 ENCOUNTER — TRANSCRIBE ORDERS (OUTPATIENT)
Dept: LAB | Facility: CLINIC | Age: 47
End: 2017-11-14

## 2017-11-14 DIAGNOSIS — Q18.1 AURICULAR CYST: ICD-10-CM

## 2017-11-14 DIAGNOSIS — H60.02 ABSCESS OF LEFT EAR CANAL: ICD-10-CM

## 2017-11-14 DIAGNOSIS — H60.02 ABSCESS OF LEFT EAR CANAL: Primary | ICD-10-CM

## 2017-11-14 LAB
ANION GAP SERPL CALCULATED.3IONS-SCNC: 9 MMOL/L (ref 4–13)
BASOPHILS # BLD AUTO: 0.03 THOUSANDS/ΜL (ref 0–0.1)
BASOPHILS NFR BLD AUTO: 0 % (ref 0–1)
BUN SERPL-MCNC: 18 MG/DL (ref 5–25)
CALCIUM SERPL-MCNC: 8.9 MG/DL (ref 8.3–10.1)
CHLORIDE SERPL-SCNC: 108 MMOL/L (ref 100–108)
CO2 SERPL-SCNC: 24 MMOL/L (ref 21–32)
CREAT SERPL-MCNC: 1.04 MG/DL (ref 0.6–1.3)
EOSINOPHIL # BLD AUTO: 0.1 THOUSAND/ΜL (ref 0–0.61)
EOSINOPHIL NFR BLD AUTO: 1 % (ref 0–6)
ERYTHROCYTE [DISTWIDTH] IN BLOOD BY AUTOMATED COUNT: 12.3 % (ref 11.6–15.1)
GFR SERPL CREATININE-BSD FRML MDRD: 65 ML/MIN/1.73SQ M
GLUCOSE SERPL-MCNC: 85 MG/DL (ref 65–140)
HCT VFR BLD AUTO: 37.2 % (ref 34.8–46.1)
HGB BLD-MCNC: 12.7 G/DL (ref 11.5–15.4)
LYMPHOCYTES # BLD AUTO: 3.37 THOUSANDS/ΜL (ref 0.6–4.47)
LYMPHOCYTES NFR BLD AUTO: 40 % (ref 14–44)
MCH RBC QN AUTO: 30.9 PG (ref 26.8–34.3)
MCHC RBC AUTO-ENTMCNC: 34.1 G/DL (ref 31.4–37.4)
MCV RBC AUTO: 91 FL (ref 82–98)
MONOCYTES # BLD AUTO: 0.45 THOUSAND/ΜL (ref 0.17–1.22)
MONOCYTES NFR BLD AUTO: 5 % (ref 4–12)
NEUTROPHILS # BLD AUTO: 4.5 THOUSANDS/ΜL (ref 1.85–7.62)
NEUTS SEG NFR BLD AUTO: 54 % (ref 43–75)
PLATELET # BLD AUTO: 199 THOUSANDS/UL (ref 149–390)
PMV BLD AUTO: 9.8 FL (ref 8.9–12.7)
POTASSIUM SERPL-SCNC: 3.5 MMOL/L (ref 3.5–5.3)
RBC # BLD AUTO: 4.11 MILLION/UL (ref 3.81–5.12)
SODIUM SERPL-SCNC: 141 MMOL/L (ref 136–145)
WBC # BLD AUTO: 8.45 THOUSAND/UL (ref 4.31–10.16)

## 2017-11-14 PROCEDURE — 80048 BASIC METABOLIC PNL TOTAL CA: CPT

## 2017-11-14 PROCEDURE — 36415 COLL VENOUS BLD VENIPUNCTURE: CPT

## 2017-11-14 PROCEDURE — 85025 COMPLETE CBC W/AUTO DIFF WBC: CPT

## 2017-12-26 ENCOUNTER — ALLSCRIPTS OFFICE VISIT (OUTPATIENT)
Dept: OTHER | Facility: OTHER | Age: 47
End: 2017-12-26
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
## 2018-01-11 NOTE — RESULT NOTES
Verified Results  (1) ALLERGY, FOOD PANEL 05VBX0865 09:18AM Patti Olea     Test Name Result Flag Reference   FISH COD <0 10 kUA/I  0 00-0 09   EGG WHITE <0 10 kUA/I  0 00-0 09   GLUTEN <0 10 kUA/I  0 00-0 09   MILK <0 10 kUA/I  0 00-0 09   PEANUT <0 10 kUA/I  0 00-0 09   F041 SALMON <0 10 kUA/I  0 00-0 09   SCALLOP <0 10 kUA/I  0 00-0 09   SESAME <0 10 kUA/I  0 00-0 09   SHRIMP <0 10 kUA/I  0 00-0 09   SOYBEAN <0 10 kUA/I  0 00-0 09   ALLERGEN TUNA (F40) IGE <0 10 kUA/I  0 00-0 09   WALNUT <0 10 kUA/I  0 00-0 09   WHEAT <0 10 kUA/I  0 00-0 09   TOTAL IGE 13 9 kU/l  0-113   ALLERGEN ALMONDS <0 10 kUA/I  0 00-0 09   ALLERGEN CASHEW <0 10 kUA/I  0 00-0 09   ALLERGEN HAZELNUT/FILBERT IGE <0 10 kUA/l  0 00-0 09   ALLERGEN COMMENT See Below     As in all diagnostic testing, a diagnosis should be made by the physician based on both test results and patient clinical history

## 2018-01-11 NOTE — PROGRESS NOTES
Chief Complaint  Patient was seen today for a blood pressure check she feels good now but while taking Benicar HCTZ 20-12 5 she was getting headaches, dizziness and lightheaded she stopped the medication and now feels mush better, her pressure today was 118/70 so she will continue with out the medication and follow up if she has any other changes  Active Problems    1  Atypical ductal hyperplasia of breast (610 8) (N60 99)   2  Cervical disc displacement (722 0) (M50 20)   3  Cervical radiculopathy (723 4) (M54 12)   4  Cervicalgia (723 1) (M54 2)   5  Chronic migraine without aura (346 70) (G43 709)   6  Common migraine without aura (346 10) (G43 009)   7  Encounter for screening mammogram for malignant neoplasm of breast (V76 12)   (Z12 31)   8  Hashimoto's thyroiditis (245 2) (E06 3)   9  Headache (784 0) (R51)   10  Headache, migraine, intractable (346 91) (G43 919)   11  Herniated cervical disc (722 0) (M50 20)   12  Hypertension (401 9) (I10)   13  Hypothyroidism (244 9) (E03 9)   14  Malignant neoplasm without specification of site (199 1) (C80 1)   15  Myofascial pain syndrome (729 1) (M79 1)   16  Nausea (787 02) (R11 0)   17  Need for influenza vaccination (V04 81) (Z23)   18  Nontoxic single thyroid nodule (241 0) (E04 1)   19  Sinusitis (473 9) (J32 9)   20  Tension type headache (339 10) (G44 209)   21  Thyroiditis (245 9) (E06 9)   22  Thyromegaly (240 9) (E04 9)    Current Meds   1  Amitriptyline HCl - 50 MG Oral Tablet; take 1 tablet daily at bedtime; Therapy: 99Otx2575 to (Last Rx:11Jan2016)  Requested for: 87MBA8751 Ordered   2  Aspirin 81 MG TABS Recorded   3  B2 TABS; Take 1 tablet daily Recorded   4  Benicar HCT 20-12 5 MG Oral Tablet; Take 1 tablet daily; Therapy: 08Apr2014 to (Last Rx:54Mze7783)  Requested for: 25WHK2652 Ordered   5  Co Q-10 100 MG Oral Capsule; Take as directed Recorded   6  Dexamethasone 2 MG Oral Tablet; 1 at the onset of headache, max 5/month;    Therapy: 48PKX2899 to (Evaluate:24Jun2016)  Requested for: 23THE7231; Last   Rx:13Vou3979 Ordered   7  Ketorolac Tromethamine 10 MG Oral Tablet; TID prn, Take at onset of headaches; Therapy: 32ILX4541 to (Evaluate:24Jun2016)  Requested for: 65NXM4860; Last   Rx:44Uac8084 Ordered   8  Levothyroxine Sodium 25 MCG Oral Tablet; Take 1 tablet daily; Therapy: 38PEM8703 to (Last Rx:06Jun2016)  Requested for: 06Jun2016 Ordered   9  Magnesium 500 MG Oral Capsule; Take as directed Recorded   10  Potassium Citrate ER TBCR; 99MG TAKE 1 TAB DAILY; Therapy: (Recorded:73Ybe8777) to Recorded   11  Tamoxifen Citrate 20 MG Oral Tablet; Therapy: 64EOT1744 to Recorded   12  Topiramate 100 MG Oral Tablet; TAKE 1 TABLET AT BEDTIME; Therapy: 59NCL9084 to (Last Rx:09Jun2016)  Requested for: 55FUH4595 Ordered   13  Topiramate 50 MG Oral Tablet; Take 1 tablet daily; Therapy: 06ZFF5959 to (Last Rx:08Jun2016)  Requested for: 84ULE6539 Ordered    Allergies    1  No Known Drug Allergies    Vitals  Signs    Systolic: 212  Diastolic: 70    Assessment    1  Hypertension (401 9) (I10)    Future Appointments    Date/Time Provider Specialty Site   10/06/2016 03:45 PM Roscoe Peña MD General Surgery [facility]     Signatures   Electronically signed by :  Nirali Larkin, ; Aug  2 2016  1:44PM EST                       (Author)    Electronically signed by : Evelyn Newton DO; Aug  2 2016  9:42PM EST                       (Author)

## 2018-01-12 VITALS
WEIGHT: 154.38 LBS | HEART RATE: 66 BPM | BODY MASS INDEX: 25.72 KG/M2 | SYSTOLIC BLOOD PRESSURE: 122 MMHG | DIASTOLIC BLOOD PRESSURE: 83 MMHG | HEIGHT: 65 IN

## 2018-01-12 VITALS
HEIGHT: 65 IN | WEIGHT: 154.38 LBS | SYSTOLIC BLOOD PRESSURE: 112 MMHG | HEART RATE: 67 BPM | BODY MASS INDEX: 25.72 KG/M2 | DIASTOLIC BLOOD PRESSURE: 79 MMHG

## 2018-01-13 VITALS
BODY MASS INDEX: 26.24 KG/M2 | HEART RATE: 70 BPM | RESPIRATION RATE: 16 BRPM | DIASTOLIC BLOOD PRESSURE: 62 MMHG | HEIGHT: 65 IN | WEIGHT: 157.5 LBS | SYSTOLIC BLOOD PRESSURE: 108 MMHG | TEMPERATURE: 96.6 F

## 2018-01-13 VITALS
HEART RATE: 71 BPM | BODY MASS INDEX: 25.74 KG/M2 | WEIGHT: 154.5 LBS | HEIGHT: 65 IN | DIASTOLIC BLOOD PRESSURE: 80 MMHG | SYSTOLIC BLOOD PRESSURE: 122 MMHG

## 2018-01-13 VITALS
HEART RATE: 60 BPM | SYSTOLIC BLOOD PRESSURE: 110 MMHG | HEIGHT: 65 IN | BODY MASS INDEX: 26.08 KG/M2 | WEIGHT: 156.5 LBS | TEMPERATURE: 98 F | DIASTOLIC BLOOD PRESSURE: 82 MMHG

## 2018-01-15 NOTE — RESULT NOTES
Verified Results  (1) CALCULI, RENAL 21Nsr2430 02:11PM Flaco Be     Test Name Result Flag Reference   COLOR Tan     SIZE 4x2x2 mm     STONE WEIGHT 12 6 mg     COMPOSITION Comment     Percentage (Represents the % composition)   CA OXALATE,DIHYDRATE 10 %     CA OXALATE,MONOHYDR  20 %     CALCIUM PHOSPHATE 70 %     NIDUS No Nidus visualized     PLEASE NOTE Comment     Calculi report with photograph will follow via computer, mail or   delivery  COMMENT-STONE3 Comment     Physician questions regarding Calculi Analysis contact [facility] at:  [phone]  PHOTO Comment     Photograph will follow under separate cover  Performed at:  [address]  [id number]  : Gaby Francis MD, Phone:  [phone]   RENAL STONE DISCLAIMER Comment     This test was developed and its performance characteristics  determined by [facility]  It has not been cleared or approved  by the Food and Drug Administration

## 2018-01-15 NOTE — PROGRESS NOTES
History of Present Illness  ED Outreach:   ED Visit Information  ED visit date: 10/28/2017   Diagnosis description: CELLULITIS OF LEFT EXTERNAL EAR   [facility] name[facility]   Discharge status: Disch: Home (Routine disch )   Number of ED visits to date: 1   ED severity: 2   In network  Outreach Information  Outreach not needed  Date finalized: 11/13/2017  Care Coordination  Emergent necessity not warranted by diagnosis  [facility] PCP  Patient had ED follow-up call with PCP/staff and no follow-up visit was scheduled  Pt not admitted from ED  Comments:   Patient was seen by her PCP on 10/24/2017 prior to ED visit  Patient has also followed up with PCP's office via telephone  Please see all tasks attached to this note        Future Appointments    Date/Time Provider Specialty Site   11/20/2017 03:20 PM Jenny Renee, [facility] Orthopedic Surgery [facility]   04/05/2018 09:30 AM Damien De Leon [facility] Neurology [address]     Message   Recorded as Task   Date: 10/27/2017 10:40 AM, Created By: Macy Hope   Task Name: Call Back   Assigned To: [facility] katherine clinical,Team   Regarding Patient: Boby Willson, Status: Complete   Comment:    Macy Hope - 27 Oct 2017 10:40 AM     TASK CREATED  Caller: Self; [phone] (Mobile Phone)  WAS SEEN TUES FOR INFECTION EAR PAREKH     SHE SAID ITS STILL RED SWOLLEN   AND PUSSY     PLEASE ADVISE   Priscilla Hunt - 27 Oct 2017 11:24 AM     TASK REPLIED TO: Previously Assigned To [facility] clinical,Team  Change antibiotic to Cleocin 300 twice a day #20   [facility] EASLEY [facility] - 27 Oct 2017 11:54 AM     TASK IN PROGRESS   Francisca Doran - 27 Oct 2017 12:00 PM     TASK REPLIED TO: Previously Assigned To [facility] katherine clinical,Team  left detailed message that rx was sent to pharmacy   AUSTIN EASLEY [facility] - 27 Oct 2017 12:00 PM     TASK COMPLETED   Recorded as Task   Date: 11/01/2017 11:07 AM, Created By: Macy Hope Task Name: Call Back   Assigned To: [facility] katherine clinical,Team   Regarding Patient: Paola Newsome, Status: Complete   Comment:    Jacob Rafa - 01 Nov 2017 11:07 AM     TASK CREATED  Caller: Self; [phone] (Mobile Phone)  [address] A INFECTED [facility]   FOR 2 WEEKS     SHE WENT TO ER LAST WEEK AND THEY DOUBLED HER MEDS   SHE FINISHED ALL THE STEROIDS   AND ITS STILL NOT GETTING ANY BETTER   STILL SWOLLEN PUSSY RED     PLEASE ADVISE   Priscilla Hunt - 01 Nov 2017 11:21 AM     [facility]: [facility]  She probably needs to see plastic surgeon because will need to have this drained; refer to Peri Perea - 01 Nov 2017 4:04 PM     TASK REPLIED TO: Previously Assigned To [facility] katherine [facility],Team   SPOKE WITH PT AND SHE WAS GIVEN THE # TO DR Price Kocher [phone] TO CALL AND SCHEDULE APPOINT ,AND WAS TOLD IF THEY DO NOT GET HER IN WITH IN A TIMELY FASHION TO CALL BACK AND WE WILL CALL ON HER BEHALF   Otto - 01 Nov 2017 4:30 PM     TASK REASSIGNED: Previously Assigned To [facility] katherine clinical,Team  PT RETURNED CALL DR Robin Ferrell DOES NOT DO INFECTED EAR PIERCING'S SO DR Steinberg Rm SAID TO  SEND HER TO DR Lobo Nieto [phone] ,PT Malgorzata Corona - 01 Nov 2017 4:30 PM     TASK COMPLETED   Recorded as Task   Date: 11/06/2017 11:20 AM, Created By: Antonio Gonzalez   Task Name: Follow Up   Assigned To:  Priscilla Hunt   Regarding Patient: Paola Newsome, Status: Complete   CommentYulia Hernandez - 06 Nov 2017 11:20 AM     TASK CREATED  Caller: Self; [phone] (Home)  fyi:, pt went to see Dr Etienne Samuel for her internal ear peircing which you ref'd, He was extremly cockey and rude, he said he couldnt tx her and why was she there, he said I am a general surg  he doesnt treat anything internal,     she did see Dr Shoemaker [facility] and was treated   Yair Chase - 06 Nov 2017 11:22 AM     TASK COMPLETED     Signatures Electronically signed by :  Alfreda Willoughby, ; Nov 13 2017 10:18AM EST                       (Author)

## 2018-01-22 VITALS
BODY MASS INDEX: 26.24 KG/M2 | SYSTOLIC BLOOD PRESSURE: 112 MMHG | DIASTOLIC BLOOD PRESSURE: 72 MMHG | HEART RATE: 64 BPM | RESPIRATION RATE: 16 BRPM | WEIGHT: 157.5 LBS | TEMPERATURE: 98 F | HEIGHT: 65 IN

## 2018-01-26 ENCOUNTER — OFFICE VISIT (OUTPATIENT)
Dept: NEUROLOGY | Facility: CLINIC | Age: 48
End: 2018-01-26
Payer: COMMERCIAL

## 2018-01-26 ENCOUNTER — TELEPHONE (OUTPATIENT)
Dept: NEUROLOGY | Facility: CLINIC | Age: 48
End: 2018-01-26

## 2018-01-26 VITALS
WEIGHT: 161 LBS | HEART RATE: 86 BPM | DIASTOLIC BLOOD PRESSURE: 98 MMHG | HEIGHT: 65 IN | BODY MASS INDEX: 26.82 KG/M2 | SYSTOLIC BLOOD PRESSURE: 135 MMHG

## 2018-01-26 DIAGNOSIS — G43.719 INTRACTABLE CHRONIC MIGRAINE WITHOUT AURA AND WITHOUT STATUS MIGRAINOSUS: Primary | ICD-10-CM

## 2018-01-26 PROBLEM — M67.442 GANGLION, LEFT HAND: Status: RESOLVED | Noted: 2017-08-03 | Resolved: 2018-01-26

## 2018-01-26 PROCEDURE — 96372 THER/PROPH/DIAG INJ SC/IM: CPT | Performed by: PHYSICIAN ASSISTANT

## 2018-01-26 PROCEDURE — 99215 OFFICE O/P EST HI 40 MIN: CPT | Performed by: PHYSICIAN ASSISTANT

## 2018-01-26 RX ORDER — KETOROLAC TROMETHAMINE 30 MG/ML
60 INJECTION, SOLUTION INTRAMUSCULAR; INTRAVENOUS ONCE
Status: COMPLETED | OUTPATIENT
Start: 2018-01-26 | End: 2018-01-26

## 2018-01-26 RX ORDER — ZONISAMIDE 25 MG/1
25 CAPSULE ORAL DAILY
Qty: 120 CAPSULE | Refills: 1 | Status: SHIPPED | OUTPATIENT
Start: 2018-01-26 | End: 2018-05-31 | Stop reason: SDUPTHER

## 2018-01-26 RX ORDER — PROCHLORPERAZINE MALEATE 10 MG
10 TABLET ORAL EVERY 6 HOURS PRN
Qty: 30 TABLET | Refills: 0 | Status: SHIPPED | OUTPATIENT
Start: 2018-01-26 | End: 2018-09-10 | Stop reason: SDUPTHER

## 2018-01-26 RX ORDER — OLANZAPINE 5 MG/1
5 TABLET ORAL
Qty: 30 TABLET | Refills: 0 | Status: SHIPPED | OUTPATIENT
Start: 2018-01-26 | End: 2018-04-05 | Stop reason: SDUPTHER

## 2018-01-26 RX ORDER — DIMENHYDRINATE 50 MG
100 TABLET ORAL AS NEEDED
COMMUNITY
End: 2021-11-23 | Stop reason: CLARIF

## 2018-01-26 RX ORDER — RIZATRIPTAN BENZOATE 10 MG/1
10 TABLET, ORALLY DISINTEGRATING ORAL ONCE AS NEEDED
Qty: 9 TABLET | Refills: 0 | Status: SHIPPED | OUTPATIENT
Start: 2018-01-26 | End: 2018-04-16 | Stop reason: SDUPTHER

## 2018-01-26 RX ORDER — AMITRIPTYLINE HYDROCHLORIDE 50 MG/1
25 TABLET, FILM COATED ORAL
COMMUNITY
Start: 2017-12-26 | End: 2019-02-20 | Stop reason: SDUPTHER

## 2018-01-26 RX ORDER — KETOROLAC TROMETHAMINE 10 MG/1
10 TABLET, FILM COATED ORAL 3 TIMES DAILY PRN
COMMUNITY
Start: 2016-02-25 | End: 2018-07-30 | Stop reason: SDUPTHER

## 2018-01-26 RX ORDER — DEXAMETHASONE 2 MG/1
2 TABLET ORAL DAILY PRN
COMMUNITY
Start: 2017-12-21 | End: 2018-08-20 | Stop reason: SDUPTHER

## 2018-01-26 RX ADMIN — KETOROLAC TROMETHAMINE 60 MG: 30 INJECTION, SOLUTION INTRAMUSCULAR; INTRAVENOUS at 15:16

## 2018-01-26 NOTE — PATIENT INSTRUCTIONS
Preventative:  Continue Amitriptyline at 50 mg at bedtime  Start zonisamide at bedtime  Take 1 tablet for 5 days  Increase to 2 tablets for 5 days  Three tablets for 5 days  Finally, 4 tablets at bedtime  We will apply for Botox as has over 15 headache days a month which last days at a time with significant affect of life  Unable to function with migraines   a lenses pain 5 mg at bedtime for the next 14 days to try to break this headache cycle    Abortive: We will try rizatriptan 10 mg  Will take prochlorperazine 10 mg at the same time as triptan in the past have caused nausea    Limit of 3 per week or 10 per month for both medications

## 2018-01-26 NOTE — TELEPHONE ENCOUNTER
pt called, today is 17th day of migraine  +nausea/vomiting, +light/sound sensitivity  amitriptyline 50mg hs  ketoralac 10mg prn   prednisone taper recently completed, took edge off but now seems worse    Pt scheduled with Lupillo Joaquin today   [phone]
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
## 2018-01-26 NOTE — PROGRESS NOTES
Patient ID: Ashlee Babcock is a 41 y o  female  Assessment/Plan:    No problem-specific Assessment & Plan notes found for this encounter  {Assess/PlanSmartLinks:14449}       Subjective:    HPI    {[facility] HPI texts:09456}    {Common ambulatory SmartLinks:87281}         Objective:    Blood pressure 135/98, pulse 86, height 5' 5" (1 651 m), weight 73 kg (161 lb), last menstrual period 11/18/2015  Physical Exam    Neurological Exam      ROS:    Review of Systems   Constitutional: Positive for appetite change  HENT: Negative  Eyes: Positive for pain  Respiratory: Negative  Cardiovascular: Negative  Gastrointestinal: Positive for nausea and vomiting  Endocrine: Negative  Genitourinary: Negative  Musculoskeletal: Positive for neck pain  Skin: Negative  Allergic/Immunologic: Negative  Neurological: Negative  Hematological: Negative  Psychiatric/Behavioral: Negative

## 2018-01-26 NOTE — ASSESSMENT & PLAN NOTE
Preventative:  Continue Amitriptyline at 50 mg at bedtime  Start zonisamide at bedtime  Take 1 tablet for 5 days  Increase to 2 tablets for 5 days  Three tablets for 5 days  Finally, 4 tablets at bedtime  We will apply for Botox as has over 15 headache days a month which last days at a time with significant affect of life  Unable to function with migraine  Has tried and failed medical management and needed to remove her Diath piercings for infections  Olanzapine 5 mg at bedtime for the next 14 days to try to break this headache cycle    Abortive: We will try rizatriptan 10 mg  Will take prochlorperazine 10 mg at the same time as triptan in the past have caused nausea  Limit of 3 per week or 10 per month for both medications    Toradol 60 mg and Compazine 10 mg given in the office today as patient has 10/10 migraine    The patient was counseled regarding:  Discussed side effects of all medications prescribed today to the patient in detail  Patient education was completed today and we also discussed precautions for rebound headaches  When patient has a moderate to severe headache, they should seek rest, initiate relaxation and apply cold compresses to the head  1  Maintain regular sleep schedule  Adults need at least 7-8 hours of uninterrupted a night  2  Limit over the counter medications such as Tylenol, Ibuprofen, Aleve, Excedrin  (No more than 3 times a week)  3  Maintain headache diary  We discussed an [employer] for a smart phone is "Migraine eDiary"  4  Limit caffeine to 1-2 cups a day or less  5  Avoid dietary trigger  (list given to the patient and reviewed with them)  6  Patient is to have regular frequent meals to prevent headache onset  6   Please drink at least 64 ounces of water a day to help remain hydrated  In addition we reviewed that some over-the-counter supplements may be used to decrease migraines and intensity of migraines  1   Magnesium Oxide 400-500 mg a day    If any diarrhea or upset stomach, decrease dose as tolerated  2  B2 400 mg a day  May take once a day or take 200 mg twice a day    We did discuss that this supplement will change the color of the urine to fluorescent yellow no matter how hydrated

## 2018-02-19 ENCOUNTER — TELEPHONE (OUTPATIENT)
Dept: NEUROLOGY | Facility: CLINIC | Age: 48
End: 2018-02-19

## 2018-02-19 DIAGNOSIS — G43.709 CHRONIC MIGRAINE WITHOUT AURA WITHOUT STATUS MIGRAINOSUS, NOT INTRACTABLE: Primary | ICD-10-CM

## 2018-02-19 NOTE — TELEPHONE ENCOUNTER
Pt called asking for refill on olanzapine  This was last ordered at last office visit  It was only ordered for 30 days, no refills  I did not see anything in office note regarding this med  Do you want her to continue on this  Migraines are better since last visit  If you want her to continue [facility]    [phone]

## 2018-02-20 RX ORDER — OLANZAPINE 5 MG/1
5 TABLET ORAL
Qty: 90 TABLET | Refills: 1 | Status: SHIPPED | OUTPATIENT
Start: 2018-02-20 | End: 2018-08-01 | Stop reason: SDUPTHER

## 2018-02-28 ENCOUNTER — PROCEDURE VISIT (OUTPATIENT)
Dept: NEUROLOGY | Facility: CLINIC | Age: 48
End: 2018-02-28
Payer: COMMERCIAL

## 2018-02-28 VITALS — DIASTOLIC BLOOD PRESSURE: 92 MMHG | HEART RATE: 86 BPM | SYSTOLIC BLOOD PRESSURE: 138 MMHG | TEMPERATURE: 98.9 F

## 2018-02-28 DIAGNOSIS — G43.719 INTRACTABLE CHRONIC MIGRAINE WITHOUT AURA AND WITHOUT STATUS MIGRAINOSUS: ICD-10-CM

## 2018-02-28 DIAGNOSIS — G43.709 CHRONIC MIGRAINE WITHOUT AURA WITHOUT STATUS MIGRAINOSUS, NOT INTRACTABLE: Primary | ICD-10-CM

## 2018-02-28 PROCEDURE — 64615 CHEMODENERV MUSC MIGRAINE: CPT | Performed by: PSYCHIATRY & NEUROLOGY

## 2018-02-28 NOTE — PROGRESS NOTES
Patient ID: Lambert Dove is a 41 y o  female  Assessment/Plan:    No problem-specific Assessment & Plan notes found for this encounter  {Assess/PlanSmartLinks:48986}       Subjective:    HPI    {[facility] HPI texts:05309}    {Common ambulatory SmartLinks:01387}         Objective:    Last menstrual period 11/18/2015      Physical Exam    Neurological Exam      ROS:    Review of Systems

## 2018-03-22 DIAGNOSIS — G43.709 CHRONIC MIGRAINE WITHOUT AURA WITHOUT STATUS MIGRAINOSUS, NOT INTRACTABLE: Primary | ICD-10-CM

## 2018-03-22 RX ORDER — ZONISAMIDE 25 MG/1
CAPSULE ORAL
Qty: 120 CAPSULE | Refills: 1 | Status: SHIPPED | OUTPATIENT
Start: 2018-03-22 | End: 2018-04-17 | Stop reason: SDUPTHER

## 2018-04-05 ENCOUNTER — OFFICE VISIT (OUTPATIENT)
Dept: NEUROLOGY | Facility: CLINIC | Age: 48
End: 2018-04-05
Payer: COMMERCIAL

## 2018-04-05 VITALS
BODY MASS INDEX: 28.96 KG/M2 | SYSTOLIC BLOOD PRESSURE: 118 MMHG | HEART RATE: 66 BPM | WEIGHT: 174 LBS | DIASTOLIC BLOOD PRESSURE: 68 MMHG

## 2018-04-05 DIAGNOSIS — G43.709 CHRONIC MIGRAINE WITHOUT AURA WITHOUT STATUS MIGRAINOSUS, NOT INTRACTABLE: Primary | ICD-10-CM

## 2018-04-05 PROCEDURE — 99214 OFFICE O/P EST MOD 30 MIN: CPT | Performed by: PHYSICIAN ASSISTANT
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
## 2018-04-05 NOTE — PATIENT INSTRUCTIONS
Chronic migraine without aura without status migrainosus, not intractable  Pt reports improvement in migraine headaches since starting Botox  Pt has had a reduction in migraine headaches by 7 days as correlated by a headache diary  Pt has had decreased trips to the ER and decreased use of abortive medications  She reports that she has gained weight since starting Amitriptyline  We did discuss trying to cut back on some of the medications after the 2nd or 3rd Botox injections as long as it continues to help with the migraines  She will follow-up at the end of May for Botox and again in 6 months for a follow-up

## 2018-04-05 NOTE — ASSESSMENT & PLAN NOTE
Pt reports improvement in migraine headaches since starting Botox  Pt has had a reduction in migraine headaches by 7 days as correlated by a headache diary  Pt has had decreased trips to the ER and decreased use of abortive medications  She reports that she has gained weight since starting Amitriptyline  We did discuss trying to cut back on some of the medications after the 2nd or 3rd Botox injections as long as it continues to help with the migraines  She will follow-up in 6 months

## 2018-04-16 ENCOUNTER — HOSPITAL ENCOUNTER (OUTPATIENT)
Dept: RADIOLOGY | Age: 48
Discharge: HOME | End: 2018-04-16
Payer: COMMERCIAL

## 2018-04-16 DIAGNOSIS — Z12.31 ENCOUNTER FOR SCREENING MAMMOGRAM FOR MALIGNANT NEOPLASM OF BREAST: ICD-10-CM

## 2018-04-16 DIAGNOSIS — G43.719 INTRACTABLE CHRONIC MIGRAINE WITHOUT AURA AND WITHOUT STATUS MIGRAINOSUS: ICD-10-CM

## 2018-04-16 PROCEDURE — 77067 SCR MAMMO BI INCL CAD: CPT

## 2018-04-16 RX ORDER — RIZATRIPTAN BENZOATE 10 MG/1
10 TABLET, ORALLY DISINTEGRATING ORAL ONCE AS NEEDED
Qty: 9 TABLET | Refills: 0 | Status: SHIPPED | OUTPATIENT
Start: 2018-04-16 | End: 2018-07-23 | Stop reason: SDUPTHER

## 2018-04-17 DIAGNOSIS — R92.8 ABNORMAL MAMMOGRAM: Primary | ICD-10-CM

## 2018-04-17 RX ORDER — BIOTIN 1 MG
TABLET ORAL
COMMUNITY
End: 2021-11-23 | Stop reason: CLARIF

## 2018-04-17 RX ORDER — TOPIRAMATE 100 MG/1
100 TABLET, FILM COATED ORAL
COMMUNITY
Start: 2015-06-29 | End: 2018-04-17 | Stop reason: SDUPTHER

## 2018-04-17 NOTE — PROGRESS NOTES
Please ensure that the follow-up studies, films, or labs as noted by this result are ordered and arranged for the patient  Please notify patient  Thanks    BI-RADS 0  Please order further imaging of right breast as indicated  I would assume this means diagnostic mammogram and or ultrasound  It is difficult to tell from this discussion results

## 2018-04-20 ENCOUNTER — HOSPITAL ENCOUNTER (OUTPATIENT)
Dept: MAMMOGRAPHY | Facility: CLINIC | Age: 48
Discharge: HOME | End: 2018-04-20
Payer: COMMERCIAL

## 2018-04-20 ENCOUNTER — TRANSCRIBE ORDERS (OUTPATIENT)
Dept: MAMMOGRAPHY | Facility: CLINIC | Age: 48
End: 2018-04-20

## 2018-04-20 DIAGNOSIS — R92.8 ABNORMAL MAMMOGRAM: Primary | ICD-10-CM

## 2018-04-20 DIAGNOSIS — R92.8 ABNORMAL MAMMOGRAM: ICD-10-CM

## 2018-04-20 PROCEDURE — 77065 DX MAMMO INCL CAD UNI: CPT

## 2018-04-23 NOTE — PROGRESS NOTES
Please ensure that the follow-up studies, films, or labs as noted by this result are ordered and arranged for the patient  Please notify patient  Thanks    Six-month follow-up, please let patient know of these results and need for follow-up in order

## 2018-04-23 NOTE — PROGRESS NOTES
Please ensure that the follow-up studies, films, or labs as noted by this result are ordered and arranged for the patient  Please notify patient  Thanks    Call patient, increasing calcifications that are new    Six-month follow-up warranted, please notify patient and order

## 2018-04-25 DIAGNOSIS — R92.8 ABNORMAL MAMMOGRAM: Primary | ICD-10-CM

## 2018-04-25 RX ORDER — TAMOXIFEN CITRATE 20 MG/1
100 TABLET ORAL
COMMUNITY
Start: 2014-12-03 | End: 2018-04-25 | Stop reason: SDUPTHER

## 2018-04-25 RX ORDER — DEXAMETHASONE 2 MG/1
2 TABLET ORAL
COMMUNITY
End: 2019-03-13 | Stop reason: SDUPTHER

## 2018-05-07 NOTE — PROGRESS NOTES
Pt had repeat mammo on 4/20 and her gyn spoke with her about the results and she is to f/u in 6 months with another US to monitor  No questions at this time

## 2018-05-29 NOTE — PROGRESS NOTES
Chemodenervation  Date/Time: 5/31/2018 3:31 PM  Performed by: Michelle Jackson  Authorized by: Gerson Hernandes details:     Prepped With: Alcohol    Anesthesia (see MAR for exact dosages): Anesthesia method:  None  Procedure details:     Position:  Upright  Botox:     Botox Type:  Type A    Brand:  Botox    mL's of Botulinum Toxin:  200    Final Concentration per CC:  200 units    Needle Gauge:  30 G 2 5 inch  Procedures:     Botox Procedures: chronic headache      Indications: migraines    Injection Location:     Head / Face:  L superior cervical paraspinal, R superior cervical paraspinal, L , R , L frontalis, R frontalis, L medial occipitalis, R medial occipitalis, procerus, R temporalis, L temporalis, R superior trapezius and L superior trapezius    L  injection amount:  5 unit(s)    R  injection amount:  5 unit(s)    L lateral frontalis:  5 unit(s)    R lateral frontalis:  5 unit(s)    L medial frontalis:  5 unit(s)    R medial frontalis:  5 unit(s)    L temporalis injection amount:  20 unit(s)    R temporalis injection amount:  20 unit(s)    Procerus injection amount:  5 unit(s)    L medial occipitalis injection amount:  15 unit(s)    R medial occipitalis injection amount:  15 unit(s)    L superior cervical paraspinal injection amount:  10 unit(s)    R superior cervical paraspinal injection amount:  10 unit(s)    L superior trapezius injection amount:  15 unit(s)    R superior trapezius injection amount:  15 unit(s)  Total Units:     Total units used:  200    Total units discarded:  0  Post-procedure details:     Chemodenervation:  Chronic migraine    Patient tolerance of procedure: Tolerated well, no immediate complications  Comments:      45 units frontalis bilaterally   All medically necessary

## 2018-05-31 ENCOUNTER — PROCEDURE VISIT (OUTPATIENT)
Dept: NEUROLOGY | Facility: CLINIC | Age: 48
End: 2018-05-31
Payer: COMMERCIAL

## 2018-05-31 VITALS — SYSTOLIC BLOOD PRESSURE: 124 MMHG | TEMPERATURE: 98.5 F | HEART RATE: 72 BPM | DIASTOLIC BLOOD PRESSURE: 84 MMHG

## 2018-05-31 DIAGNOSIS — G43.719 INTRACTABLE CHRONIC MIGRAINE WITHOUT AURA AND WITHOUT STATUS MIGRAINOSUS: ICD-10-CM

## 2018-05-31 DIAGNOSIS — G43.709 CHRONIC MIGRAINE WITHOUT AURA WITHOUT STATUS MIGRAINOSUS, NOT INTRACTABLE: Primary | ICD-10-CM

## 2018-05-31 PROCEDURE — 64615 CHEMODENERV MUSC MIGRAINE: CPT | Performed by: PHYSICIAN ASSISTANT

## 2018-05-31 RX ORDER — ZONISAMIDE 100 MG/1
100 CAPSULE ORAL
Qty: 90 CAPSULE | Refills: 1 | Status: SHIPPED | OUTPATIENT
Start: 2018-05-31 | End: 2018-05-31 | Stop reason: SDUPTHER

## 2018-05-31 RX ORDER — ZONISAMIDE 100 MG/1
100 CAPSULE ORAL
Qty: 90 CAPSULE | Refills: 0 | Status: SHIPPED | OUTPATIENT
Start: 2018-05-31 | End: 2018-06-06 | Stop reason: SDUPTHER

## 2018-06-06 ENCOUNTER — TELEPHONE (OUTPATIENT)
Dept: FAMILY MEDICINE CLINIC | Facility: CLINIC | Age: 48
End: 2018-06-06

## 2018-06-06 DIAGNOSIS — G43.719 INTRACTABLE CHRONIC MIGRAINE WITHOUT AURA AND WITHOUT STATUS MIGRAINOSUS: ICD-10-CM

## 2018-06-06 RX ORDER — ZONISAMIDE 100 MG/1
100 CAPSULE ORAL
COMMUNITY
End: 2018-06-06

## 2018-06-06 RX ORDER — ZONISAMIDE 100 MG/1
100 CAPSULE ORAL
Qty: 7 CAPSULE | Refills: 0 | Status: SHIPPED | OUTPATIENT
Start: 2018-06-06 | End: 2018-08-13 | Stop reason: SDUPTHER

## 2018-06-06 NOTE — TELEPHONE ENCOUNTER
[facility] called and states that [facility] yesterday    Pt requesting bridge supply until this arrives

## 2018-06-06 NOTE — TELEPHONE ENCOUNTER
Patient called asking if she could possibly cut her Amitriptyline medication in half? She stated she has gained 30 lbs since [facility] ([place], [place])

## 2018-07-23 DIAGNOSIS — G43.719 INTRACTABLE CHRONIC MIGRAINE WITHOUT AURA AND WITHOUT STATUS MIGRAINOSUS: ICD-10-CM

## 2018-07-23 RX ORDER — RIZATRIPTAN BENZOATE 10 MG/1
10 TABLET, ORALLY DISINTEGRATING ORAL ONCE AS NEEDED
Qty: 9 TABLET | Refills: 0 | Status: SHIPPED | OUTPATIENT
Start: 2018-07-23 | End: 2018-09-10 | Stop reason: SDUPTHER

## 2018-07-30 ENCOUNTER — OFFICE VISIT (OUTPATIENT)
Dept: NEUROLOGY | Facility: CLINIC | Age: 48
End: 2018-07-30
Payer: COMMERCIAL

## 2018-07-30 VITALS
HEIGHT: 65 IN | HEART RATE: 92 BPM | RESPIRATION RATE: 12 BRPM | DIASTOLIC BLOOD PRESSURE: 86 MMHG | SYSTOLIC BLOOD PRESSURE: 129 MMHG

## 2018-07-30 DIAGNOSIS — G43.709 CHRONIC MIGRAINE WITHOUT AURA WITHOUT STATUS MIGRAINOSUS, NOT INTRACTABLE: Primary | ICD-10-CM

## 2018-07-30 PROCEDURE — 99214 OFFICE O/P EST MOD 30 MIN: CPT | Performed by: PHYSICIAN ASSISTANT

## 2018-07-30 RX ORDER — KETOROLAC TROMETHAMINE 10 MG/1
10 TABLET, FILM COATED ORAL 3 TIMES DAILY PRN
Qty: 10 TABLET | Refills: 0 | Status: SHIPPED | OUTPATIENT
Start: 2018-07-30 | End: 2018-09-10 | Stop reason: SDUPTHER

## 2018-07-30 NOTE — PROGRESS NOTES
Patient ID: Katherine Clinton is a 41 y o  female  Assessment/Plan:    No problem-specific Assessment & Plan notes found for this encounter  {Assess/PlanSmartLinks:46038}       Subjective:    HPI    {[facility] HPI texts:78419}    {Common ambulatory SmartLinks:85893}         Objective:    Blood pressure 129/86, pulse 92, resp  rate 12, height 5' 5" (1 651 m), last menstrual period 11/18/2015  Physical Exam    Neurological Exam      ROS:    Review of Systems   Constitutional: Positive for activity change and appetite change  Weight gain    HENT: Negative  Eyes: Negative  Respiratory: Negative  Cardiovascular: Negative  Gastrointestinal: Negative  Endocrine: Negative  Genitourinary: Negative  Musculoskeletal: Negative  Skin: Negative  Allergic/Immunologic: Negative  Neurological: Negative  Hematological: Negative  Psychiatric/Behavioral: Negative
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
## 2018-07-30 NOTE — ASSESSMENT & PLAN NOTE
Preventative:  Continue Botox every 3 months  Continue Amitriptyline at 25 mg at bedtime  Try to wean Olanzapine  Star tiwht 2 5 mg at bedtime for 14 days or so  If no increase in migraines or headaches, then try to stop and use as needed    Abortive:    Rizatriptan 10 mg  and prochlorperazine 10 mg at the same time  Rizatriptan can be repeat in 2 hours if you still have a migraine  Prochlorperazine is 6 hours  Limit of 3 per week or 10 per month for both medications  If you have a migraine, take Ketoralac at bedtime or if you still have significant pain after repeating Rizatriptan

## 2018-07-30 NOTE — PATIENT INSTRUCTIONS
Preventative:  Continue Botox every 3 months  Continue Amitriptyline at 25 mg at bedtime  Try to wean Olanzapine  Star tiwht 2 5 mg at bedtime for 14 days or so  If no increase in migraines or headaches, then try to stop and use as needed    Abortive:    Rizatriptan 10 mg  and prochlorperazine 10 mg at the same time  Rizatriptan can be repeat in 2 hours if you still have a migraine  Prochlorperazine is 6 hours  Limit of 3 per week or 10 per month for both medications  If you have a migraine, take Ketoralac at bedtime or if you still have significant pain after repeating Rizatriptan  - Young patient who are still in a reproductive age, should take folic acid daily when taking anti-seiuzre drugs especially Depakote  May take these over-the-counter supplements to decrease intensity and frequency of migraines  - Magnesium Oxide 400-500 mg a day  If any diarrhea or upset stomach, decrease dose  as tolerated  -  B2 200 mg a day  May take once a day in am  This supplement will change the color of the urine to fluorescent yellow no matter how hydrated, which is normal      - When patient has a moderate to severe headache, they should seek rest, initiate relaxation and apply cold compresses to the head  - Maintain regular sleep schedule  Adults need at least 7-8 hours of uninterrupted a night  - Limit over the counter medications such as Tylenol, Ibuprofen, Aleve, Excedrin  (No more than 3 times a week)  - Maintain headache diary  We discussed an [employer] for a smart phone is "Migraine eDiary"  - Limit caffeine to 1-2 cups a day or less  - Avoid dietary trigger  (aged cheese, peanuts, MSG, aspartame and nitrates)  - Patient is to have regular frequent meals to prevent headache onset  - Please drink at least 64 ounces of water a day to help remain hydrated  Please call with any questions or concerns

## 2018-08-01 DIAGNOSIS — G43.709 CHRONIC MIGRAINE WITHOUT AURA WITHOUT STATUS MIGRAINOSUS, NOT INTRACTABLE: ICD-10-CM

## 2018-08-01 RX ORDER — OLANZAPINE 5 MG/1
TABLET ORAL
Qty: 90 TABLET | Refills: 1 | Status: SHIPPED | OUTPATIENT
Start: 2018-08-01 | End: 2019-03-07 | Stop reason: SDUPTHER

## 2018-08-13 DIAGNOSIS — G43.719 INTRACTABLE CHRONIC MIGRAINE WITHOUT AURA AND WITHOUT STATUS MIGRAINOSUS: ICD-10-CM

## 2018-08-13 RX ORDER — ZONISAMIDE 100 MG/1
100 CAPSULE ORAL
Qty: 90 CAPSULE | Refills: 1 | Status: SHIPPED | OUTPATIENT
Start: 2018-08-13 | End: 2019-01-21 | Stop reason: SDUPTHER

## 2018-08-17 ENCOUNTER — TELEPHONE (OUTPATIENT)
Dept: NEUROLOGY | Facility: CLINIC | Age: 48
End: 2018-08-17

## 2018-08-17 DIAGNOSIS — G43.709 CHRONIC MIGRAINE WITHOUT AURA WITHOUT STATUS MIGRAINOSUS, NOT INTRACTABLE: Primary | ICD-10-CM

## 2018-08-17 NOTE — TELEPHONE ENCOUNTER
Spoke with  [facility] and she stated patient is new  Requesting script and insurance cards be faxed to [phone] to start new order

## 2018-08-17 NOTE — TELEPHONE ENCOUNTER
Referral Notes   Number of Notes: 1   Type Date User Summary Attachment   General 08/09/2018  1:51 PM Alban Kanner care coordination -   Note    Botox - no Zena Pastor is needed - must go through [employer]

## 2018-08-20 ENCOUNTER — TELEPHONE (OUTPATIENT)
Dept: NEUROLOGY | Facility: CLINIC | Age: 48
End: 2018-08-20

## 2018-08-20 DIAGNOSIS — G43.709 CHRONIC MIGRAINE WITHOUT AURA WITHOUT STATUS MIGRAINOSUS, NOT INTRACTABLE: Primary | ICD-10-CM

## 2018-08-20 RX ORDER — DEXAMETHASONE 2 MG/1
2 TABLET ORAL DAILY PRN
Qty: 5 TABLET | Refills: 0 | Status: SHIPPED | OUTPATIENT
Start: 2018-08-20 | End: 2020-04-29 | Stop reason: SDUPTHER

## 2018-08-20 RX ORDER — DIVALPROEX SODIUM 250 MG/1
250 TABLET, EXTENDED RELEASE ORAL
Qty: 8 TABLET | Refills: 0 | Status: SHIPPED | OUTPATIENT
Start: 2018-08-20 | End: 2019-04-10

## 2018-08-20 RX ORDER — ONABOTULINUMTOXINA 200 [USP'U]/1
INJECTION, POWDER, LYOPHILIZED, FOR SOLUTION INTRADERMAL; INTRAMUSCULAR
Qty: 200 UNITS | Refills: 3 | Status: SHIPPED | OUTPATIENT
Start: 2018-08-20 | End: 2018-08-20 | Stop reason: CLARIF

## 2018-08-20 RX ORDER — ONABOTULINUMTOXINA 200 [USP'U]/1
INJECTION, POWDER, LYOPHILIZED, FOR SOLUTION INTRADERMAL; INTRAMUSCULAR
Qty: 200 UNITS | Refills: 3 | Status: CANCELLED | OUTPATIENT
Start: 2018-08-20

## 2018-08-20 NOTE — TELEPHONE ENCOUNTER
New script, insurance card and photo ID, last office note and demographic page being faxed to [facility] I will await call to schedule delivery

## 2018-08-20 NOTE — TELEPHONE ENCOUNTER
When did migraine start? Last tuesday  Location/Description: "constant dull pain  Started in my neck and right side of my head", frontal area  Pain scale: 7/10  Associated symptoms:photophobia  Precipitating factors: " I don't know what brings it on"  Alleviating factors: prescription (rizatriptan, olanzapine, toradol)  What medications have you tried for this migraine headache?    PREVENTIVE: Topamax, Amitriptyline, Amrix, Tizanidine, Benacar, Zonisamide, Botox, olanzapine  ABORTIVE: Imitrex, Zofran, Toradol, Dexamethasone, rizatriptan, olanzapine    Current migraine medications are confirmed as:  Amitriptyline 25 mg at bedtime  zonisamide 100 mg at bedtime  Olanzapine prn    Medications tried in the past? Tried decadron which helped w/ her migraine  Tried depakote in the past but pt does not remember if it helped w/ her migraine    [phone]

## 2018-08-20 NOTE — TELEPHONE ENCOUNTER
Attempted to fax multiple times but fax failed to send  Spoke with rep Frederic Zamora who provided new fax # of   I will attempt

## 2018-08-20 NOTE — TELEPHONE ENCOUNTER
Will do decadron 2 mg for 5 days in am and depakote 250 mg 2 tabs for 3 nights and 1 tab for 2 nights    Sent to [employer]

## 2018-08-21 NOTE — TELEPHONE ENCOUNTER
Per Aris I had to submit Auth request thru [facility]  Submitted it today, Will f/u in a few days to check status

## 2018-08-22 NOTE — TELEPHONE ENCOUNTER
Botox 200 units approved through [facility] effective 8/21/18 through 8/20/19  Called to schedule delivery but was not able to because it has to go through insurance verification process again  I will check back tomorrow

## 2018-08-23 NOTE — TELEPHONE ENCOUNTER
Spoke with rep Krystle Islas and she stated since this is the patients first time filling with [facility] they must reach out to patient and go over shipment information and get patient's consent  Once this is done we can schedule delivery  Need by date was noted as Wednesday 8/29/18

## 2018-08-24 NOTE — TELEPHONE ENCOUNTER
Spoke with rep Carlos gautam scheduled delivery of Botox 200 unit SDV to [place] office on Tuesday 8/28/18  I will await shipment

## 2018-08-27 NOTE — TELEPHONE ENCOUNTER
Pt states that her headaches has not gone away  She states that headaches/migraine feels the same  She states that decadron and depakote not effective  Pt is asking if there are any other meds she can try?     Thanks

## 2018-08-28 NOTE — TELEPHONE ENCOUNTER
Called and advised pt of the below  She verbalized clear understanding  Agreeable to go back on olanzapine 5 mg at bedtime  She states that she still have enough left over  No need to send a rx         Thanks

## 2018-08-28 NOTE — TELEPHONE ENCOUNTER
Her olanzapine was weaned off  Would she be willing to go back on that to see how she does  If yes, have her start 5mg at bedtime   thx

## 2018-08-28 NOTE — TELEPHONE ENCOUNTER
Spoke with rep Hairston to confirm botox is still being delivered today by [employer]   Will arrive by 3 pm  Tracking # [id number]

## 2018-09-05 NOTE — PROGRESS NOTES
Chemodenervation  Date/Time: 9/10/2018 3:52 PM  Performed by: Kendell Cast  Authorized by: Ivanna Glover details:     Prepped With: Alcohol    Anesthesia (see MAR for exact dosages): Anesthesia method:  None  Procedure details:     Position:  Upright  Botox:     Botox Type:  Type A    Brand:  Botox    mL's of Botulinum Toxin:  200    Final Concentration per CC:  200 units    Needle Gauge:  30 G 2 5 inch  Procedures:     Botox Procedures: chronic headache      Indications: migraines    Injection Location:     Head / Face:  L superior cervical paraspinal, R superior cervical paraspinal, L , R , L frontalis, R frontalis, L medial occipitalis, R medial occipitalis, procerus, R temporalis, L temporalis, R superior trapezius and L superior trapezius    L  injection amount:  5 unit(s)    R  injection amount:  5 unit(s)    L lateral frontalis:  5 unit(s)    R lateral frontalis:  5 unit(s)    L medial frontalis:  5 unit(s)    R medial frontalis:  5 unit(s)    L temporalis injection amount:  20 unit(s)    R temporalis injection amount:  20 unit(s)    Procerus injection amount:  5 unit(s)    L medial occipitalis injection amount:  15 unit(s)    R medial occipitalis injection amount:  15 unit(s)    L superior cervical paraspinal injection amount:  10 unit(s)    R superior cervical paraspinal injection amount:  10 unit(s)    L superior trapezius injection amount:  15 unit(s)    R superior trapezius injection amount:  15 unit(s)  Total Units:     Total units used:  200    Total units discarded:  0  Post-procedure details:     Chemodenervation:  Chronic migraine    Patient tolerance of procedure:   Tolerated well, no immediate complications  Comments:      45 units frontalis  All medically necessary

## 2018-09-10 ENCOUNTER — PROCEDURE VISIT (OUTPATIENT)
Dept: NEUROLOGY | Facility: CLINIC | Age: 48
End: 2018-09-10
Payer: COMMERCIAL

## 2018-09-10 VITALS — TEMPERATURE: 98.4 F | SYSTOLIC BLOOD PRESSURE: 118 MMHG | HEART RATE: 82 BPM | DIASTOLIC BLOOD PRESSURE: 97 MMHG

## 2018-09-10 DIAGNOSIS — G43.719 INTRACTABLE CHRONIC MIGRAINE WITHOUT AURA AND WITHOUT STATUS MIGRAINOSUS: ICD-10-CM

## 2018-09-10 DIAGNOSIS — G43.709 CHRONIC MIGRAINE WITHOUT AURA WITHOUT STATUS MIGRAINOSUS, NOT INTRACTABLE: Primary | ICD-10-CM

## 2018-09-10 PROCEDURE — 64615 CHEMODENERV MUSC MIGRAINE: CPT | Performed by: PHYSICIAN ASSISTANT

## 2018-09-10 RX ORDER — KETOROLAC TROMETHAMINE 10 MG/1
10 TABLET, FILM COATED ORAL 3 TIMES DAILY PRN
Qty: 10 TABLET | Refills: 0 | Status: SHIPPED | OUTPATIENT
Start: 2018-09-10 | End: 2018-12-13 | Stop reason: SDUPTHER

## 2018-09-10 RX ORDER — RIZATRIPTAN BENZOATE 10 MG/1
10 TABLET, ORALLY DISINTEGRATING ORAL ONCE AS NEEDED
Qty: 9 TABLET | Refills: 0 | Status: SHIPPED | OUTPATIENT
Start: 2018-09-10 | End: 2018-12-05 | Stop reason: SDUPTHER

## 2018-09-10 RX ORDER — PROCHLORPERAZINE MALEATE 10 MG
10 TABLET ORAL EVERY 6 HOURS PRN
Qty: 10 TABLET | Refills: 0 | Status: SHIPPED | OUTPATIENT
Start: 2018-09-10 | End: 2018-12-13 | Stop reason: SDUPTHER

## 2018-10-03 ENCOUNTER — OFFICE VISIT (OUTPATIENT)
Dept: NEUROLOGY | Facility: CLINIC | Age: 48
End: 2018-10-03
Payer: COMMERCIAL

## 2018-10-03 VITALS
BODY MASS INDEX: 32.99 KG/M2 | WEIGHT: 198 LBS | HEIGHT: 65 IN | SYSTOLIC BLOOD PRESSURE: 139 MMHG | HEART RATE: 80 BPM | DIASTOLIC BLOOD PRESSURE: 89 MMHG

## 2018-10-03 DIAGNOSIS — G43.709 CHRONIC MIGRAINE WITHOUT AURA WITHOUT STATUS MIGRAINOSUS, NOT INTRACTABLE: Primary | ICD-10-CM

## 2018-10-03 PROCEDURE — 99213 OFFICE O/P EST LOW 20 MIN: CPT | Performed by: PHYSICIAN ASSISTANT
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
## 2018-10-03 NOTE — ASSESSMENT & PLAN NOTE
Since starting Botox pt has had a reduction in migraine headaches by 7 days as correlated by a headache diary  Pt has had decreased trips to the ER and decreased use of abortive medications  Pt will continue the preventative medications for now  Tapering off the medications will be discussed in the future  She will follow-up in 6 months

## 2018-10-03 NOTE — PATIENT INSTRUCTIONS
Chronic migraine without aura without status migrainosus, not intractable  Since starting Botox pt has had a reduction in migraine headaches by 7 days as correlated by a headache diary  Pt has had decreased trips to the ER and decreased use of abortive medications  Pt will continue the preventative medications for now  Tapering off the medications will be discussed in the future  She will follow-up in 6 months

## 2018-10-22 ENCOUNTER — HOSPITAL ENCOUNTER (OUTPATIENT)
Dept: MAMMOGRAPHY | Facility: CLINIC | Age: 48
Discharge: HOME | End: 2018-10-22
Payer: COMMERCIAL

## 2018-10-22 DIAGNOSIS — R92.8 ABNORMAL MAMMOGRAM: ICD-10-CM

## 2018-10-22 PROCEDURE — 77065 DX MAMMO INCL CAD UNI: CPT

## 2018-10-23 DIAGNOSIS — R92.1 BREAST CALCIFICATION SEEN ON MAMMOGRAM: Primary | ICD-10-CM

## 2018-10-23 NOTE — PROGRESS NOTES
Please ensure that the follow-up studies, films, or labs as noted by this result are ordered and arranged for the patient  Please notify patient    Six-month follow-up as written  Thanks

## 2018-10-23 NOTE — PROGRESS NOTES
Please ensure that the follow-up studies, films, or labs as noted by this result are ordered and arranged for the patient  Please notify patient   Six month follow up   Thanks

## 2018-10-23 NOTE — PROGRESS NOTES
Please ensure that the follow-up studies, films, or labs as noted by this result are ordered and arranged for the patient  Please notify patient    Thanks

## 2018-11-18 NOTE — ED PROVIDER NOTES
History  Chief Complaint   Patient presents with    Ear Problem     Pt presents to ED from home w/ swelling, redness, and drainage to outter ear from a piercing  Pt given abx two weeks ago w/o relief  Pt (+) thyroid, migraine hx      44-year-old female presents with chief complaint of left ear pain  Patient was seen here approximately 2 weeks ago after she had discomfort related to a piercing that she had in the mic helix of the ear  Patient had a piercing for approximately 6 months prior to onset of symptoms  Patient was seen here and started on antibiotics  Patient was then seen at ENT and diagnosed with a Pseudomonas infection of the ear  Patient's antibiotics were changed she was also given a topical ointment  No fevers or chills  Ear continually drains  Patient presents because she is now also having some inner ear pain and jaw clicking  History provided by:  Patient   used: No    Earache   Location:  Left  Behind ear:  Redness and swelling  Quality:  Aching and sharp  Severity:  Moderate  Onset quality:  Gradual  Duration:  2 weeks  Timing:  Constant  Progression:  Unchanged  Chronicity:  New  Context comment:  Piercing infection  Relieved by:  Nothing  Worsened by:  Nothing  Ineffective treatments: antibiotics  Associated symptoms: headaches    Associated symptoms: no ear discharge, no fever and no neck pain        Prior to Admission Medications   Prescriptions Last Dose Informant Patient Reported? Taking? POTASSIUM CITRATE ER PO   Yes No   Sig: Take 99 mg by mouth daily   Riboflavin (VITAMIN B-2) 25 MG TABS   Yes No   Sig: Take by mouth   aspirin 81 MG tablet   Yes No   Sig: Take 81 mg by mouth daily     azelastine (ASTELIN) 0 1 % nasal spray   Yes No   Si spray into each nostril 2 (two) times a day Use in each nostril as needed    clindamycin (CLEOCIN) 300 MG capsule   Yes No   Sig: Take 300 mg by mouth 2 (two) times a day   co-enzyme Q-10 50 MG capsule   Yes Ventricular Rate : 73   Atrial Rate : 73   P-R Interval : 128   QRS Duration : 92   Q-T Interval : 428   QTC Calculation(Bezet) : 471   P Axis : 108   R Axis : 9   T Axis : 90   Diagnosis : Normal sinus rhythm~Nonspecific T wave abnormality~Prolonged QT~Abnormal ECG~When compared with ECG of 27-JUL-2015 09:27,~No significant change was found~Confirmed by JOSE MIGUEL SMITH, F ([id number]) on 7/24/2018 1:10:15 PM      No   Sig: Take 100 mg by mouth daily  levothyroxine 25 mcg tablet   Yes No   Sig: Take 25 mcg by mouth daily  magnesium gluconate (MAGONATE) 500 mg tablet   Yes No   Sig: Take 500 mg by mouth 2 (two) times a day  predniSONE 1 mg tablet   Yes No   Sig: Take 5 mg by mouth daily   tamoxifen (NOLVADEX) 20 mg tablet   Yes No   Sig: Take 20 mg by mouth 2 (two) times a day  topiramate (TOPAMAX) 100 mg tablet   Yes No   Sig: Take 150 mg by mouth 2 (two) times a day Indications: 50mg AM  100 mg PM       Facility-Administered Medications: None       Past Medical History:   Diagnosis Date    Cervicalgia     disc displacement,radiculopathy    Disease of thyroid gland     Kidney stone     Migraine     Myofascial pain syndrome        Past Surgical History:   Procedure Laterality Date    BREAST BIOPSY Right     biospy w/ marker    CARPAL TUNNEL RELEASE Right     GANGLION CYST EXCISION Left 8/3/2017    Procedure: WRIST/HAND MASS EXCISION;  Surgeon: Josias Lu MD;  [facility];  Service: Orthopedics    HYSTERECTOMY         Family History   Problem Relation Age of Onset    Hypertension Mother     Diabetes Father     Hypertension Father      I have reviewed and agree with the history as documented  Social History   Substance Use Topics    Smoking status: Never Smoker    Smokeless tobacco: Never Used    Alcohol use No        Review of Systems   Constitutional: Negative for chills and fever  HENT: Positive for ear pain and facial swelling  Negative for ear discharge  Musculoskeletal: Negative for neck pain  Skin: Positive for wound (drainage from ear mic helix)  Neurological: Positive for headaches  All other systems reviewed and are negative        Physical Exam  ED Triage Vitals   Temperature Pulse Respirations Blood Pressure SpO2   11/12/17 2020 11/12/17 2019 11/12/17 2019 11/12/17 2019 11/12/17 2019   98 4 °F (36 9 °C) 78 18 149/86 99 %      Temp Source Heart Rate Source Patient Position - Orthostatic VS BP Location FiO2 (%)   11/12/17 2020 11/12/17 2019 11/12/17 2019 11/12/17 2019 --   Oral Monitor Lying Right arm       Pain Score       11/12/17 2253       8           Orthostatic Vital Signs  Vitals:    11/12/17 2019 11/12/17 2325   BP: 149/86 121/81   Pulse: 78 71   Patient Position - Orthostatic VS: Lying        Physical Exam   Constitutional: She is oriented to person, place, and time  She appears well-developed and well-nourished  She appears distressed  HENT:   Head: Normocephalic and atraumatic  Eyes: EOM are normal  Pupils are equal, round, and reactive to light  Neck: Normal range of motion  No JVD present  Cardiovascular: Normal rate, regular rhythm, normal heart sounds and intact distal pulses  Exam reveals no gallop and no friction rub  No murmur heard  Pulmonary/Chest: Effort normal and breath sounds normal  No respiratory distress  She has no wheezes  She has no rales  She exhibits no tenderness  Musculoskeletal: Normal range of motion  She exhibits no tenderness  Neurological: She is alert and oriented to person, place, and time  Skin: Skin is warm and dry  Martina helix with abscess, spontaneous drainage     Psychiatric: She has a normal mood and affect  Her behavior is normal  Judgment and thought content normal    Nursing note and vitals reviewed        ED Medications  Medications   morphine (MSIR) IR tablet 15 mg (15 mg Oral Given 11/12/17 2253)   lidocaine (PF) (XYLOCAINE-MPF) 1 % injection 10 mL (10 mL Infiltration Given 11/12/17 2119)       Diagnostic Studies  Results Reviewed     None                 CT orbits/temporal bones/skull base wo contrast    (Results Pending)              Procedures  Incision/Drainage  Date/Time: 11/12/2017 9:45 PM  Performed by: Aby Santiago by: Enedina Koroma     Patient location:  ED  Consent:     Consent obtained:  Verbal    Consent given by:  Patient    Risks discussed:  Bleeding and pain    Alternatives discussed:  No treatment  Universal protocol:     Procedure explained and questions answered to patient or proxy's satisfaction: yes      Patient identity confirmed:  Verbally with patient  Location:     Type:  Abscess    Location:  Head/neck    Head/neck location:  L external ear  Pre-procedure details:     Skin preparation:  Antiseptic wash  Anesthesia (see MAR for exact dosages): Anesthesia method:  Nerve block    Block location:  Auricular nerve block    Block needle gauge:  27 G    Block anesthetic:  Lidocaine 1% w/o epi    Block technique:  Circumferential auricular block    Block injection procedure:  Anatomic landmarks identified    Block outcome:  Anesthesia achieved  Procedure details:     Complexity:  Simple    Needle aspiration: no      Incision types:  Stab incision    Scalpel blade:  11    Approach:  Open    Incision depth:  Skin    Wound management:  Probed and deloculated and irrigated with saline    Drainage:  Bloody and purulent    Drainage amount:  Scant    Wound treatment:  Wound left open    Packing materials:  None  Post-procedure details:     Patient tolerance of procedure: Tolerated well, no immediate complications           Phone Contacts  ED Phone Contact    ED Course  ED Course                                MDM  Number of Diagnoses or Management Options  Abscess of left external ear: new and requires workup  Diagnosis management comments: Background: 41 y o  female presents with abscess to left ear, now with headache and jaw clicking    Differential DX includes but is not limited to: worsening or recurrent abscess, otitis externa, less likely otitis media    Plan: ct skull base, I&D, symptom control         Amount and/or Complexity of Data Reviewed  Tests in the radiology section of CPT®: ordered and reviewed ([facility]AD reading:  Normal right temporal bone CT    Soft tissue swelling with subcutaneous emphysema both superior to and inferior to the left external  auditory canal  The findings may reflect an inflammatory etiology  Please correlate with the patient's  clinical findings )    Risk of Complications, Morbidity, and/or Mortality  Diagnostic procedures: high  Management options: high    Patient Progress  Patient progress: stable    CritCare Time    Disposition  Final diagnoses:   Abscess of left external ear - acute mic helix     Time reflects when diagnosis was documented in both MDM as applicable and the Disposition within this note     Time User Action Codes Description Comment    11/12/2017  9:58 PM Ok Distance Add [K50 10] Crohn's colitis, without complications ([facility] 75 )     74/04/0778  9:58 PM Ok Distance Add [K08 89] Toothache     11/12/2017  9:59 PM Kerry Heman B Modify [K08 89] Toothache acute tooth #16    11/12/2017  9:59 PM Kerry Heman B Modify [K08 89] Toothache acute tooth #17    11/12/2017 10:00 PM Kerry Heman B Modify [K08 89] Toothache acute tooth #17    11/12/2017 10:00 PM Ok Distance Remove [K50 10] Crohn's colitis, without complications ([facility] 75 )     06/77/0716 10:00 PM Ok Distance Remove [K08 89] Toothache acute tooth #17    11/13/2017 12:16 AM Kerry Heman B Add [H60 02] Abscess of left external ear     11/13/2017 12:16 AM Kerry Heman B Modify [H60 02] Abscess of left external ear acute mic helix      ED Disposition     ED Disposition Condition Comment    Discharge          Follow-up Information     Follow up With Specialties Details Why DO [facility] Schedule an appointment as soon as possible for a visit As needed [address]  [phone]          Patient's Medications   Discharge Prescriptions    MORPHINE (MSIR) 15 MG TABLET    Take 1 tablet by mouth every 4 (four) hours as needed for severe pain for up to 15 doses Max Daily Amount: 90 mg       Start Date: 11/13/2017End Date: --       Order Dose: 15 mg       Quantity: 15 tablet    Refills: 0     No discharge procedures on

## 2018-11-29 ENCOUNTER — TELEPHONE (OUTPATIENT)
Dept: NEUROLOGY | Facility: CLINIC | Age: 48
End: 2018-11-29

## 2018-11-29 NOTE — TELEPHONE ENCOUNTER
Left message regarding change in location for BINJ on 12/20/18 at 3:30  Provided address and also mailing directions to patient

## 2018-11-30 NOTE — TELEPHONE ENCOUNTER
Referral Notes   Number of Notes: 1   Type Date User Summary Attachment   General 11/27/2018  1:21 PM [facility] [facility] 10 coordination -   Note    [facility] [place] is not needed    Please use [id number]      Thank you

## 2018-11-30 NOTE — TELEPHONE ENCOUNTER
Spoke with rep Christian Rivero to start new fill request for Botox, need by date of 12/13/18  Provided [facility] address  Previous auth from [facility] still active  # Marguerite Prado approved 8/21/18-8/20/19

## 2018-12-04 NOTE — TELEPHONE ENCOUNTER
Spoke with rep Salas who stated order is in insurance waiting on verifying major medical benefits, P A is on file with them  Should be ready within 24 hours  I will call back Thursday for update

## 2018-12-05 ENCOUNTER — TELEPHONE (OUTPATIENT)
Dept: NEUROLOGY | Facility: CLINIC | Age: 48
End: 2018-12-05

## 2018-12-05 DIAGNOSIS — G43.709 CHRONIC MIGRAINE WITHOUT AURA WITHOUT STATUS MIGRAINOSUS, NOT INTRACTABLE: Primary | ICD-10-CM

## 2018-12-05 DIAGNOSIS — G43.719 INTRACTABLE CHRONIC MIGRAINE WITHOUT AURA AND WITHOUT STATUS MIGRAINOSUS: ICD-10-CM

## 2018-12-05 RX ORDER — RIZATRIPTAN BENZOATE 10 MG/1
10 TABLET, ORALLY DISINTEGRATING ORAL ONCE AS NEEDED
Qty: 9 TABLET | Refills: 0 | Status: SHIPPED | OUTPATIENT
Start: 2018-12-05 | End: 2019-03-04 | Stop reason: SDUPTHER

## 2018-12-05 RX ORDER — DEXAMETHASONE 2 MG/1
2 TABLET ORAL DAILY
Qty: 5 TABLET | Refills: 0 | Status: SHIPPED | OUTPATIENT
Start: 2018-12-05 | End: 2018-12-10

## 2018-12-05 NOTE — TELEPHONE ENCOUNTER
Patient is c/o of migraine that she's had since Sunday  Pain is 7/10 constant dull, right frontal, behind both eyes  Denies other sxs  Took maxalt  (4 times total) helps momentarily  No OTCs    Current meds:  Olanzapine 5 mg at bed  Elavil 25 mg at bed  zonisamide 100 mg cap at bed      toradol didn't seem to help (out of med)  Not taking depakote (states it didn't work)    Requesting a steroid to break migraine

## 2018-12-05 NOTE — TELEPHONE ENCOUNTER
Pt called and advised pt of all of the below  She verbalized clear understanding of all instructions

## 2018-12-07 NOTE — TELEPHONE ENCOUNTER
Spoke with rep Jose Camacho who stated order is still with insurance waiting for the major medical benefits to be verified  I was transferred to rep Madison Hunt who stated no work has been done to verify because order was set as standard not STAT as I requested  He stated he will email rep working on order and change order to STAT  I did have him edwin that order must be delivered by 12/12/18  I will check back on Monday

## 2018-12-10 NOTE — TELEPHONE ENCOUNTER
Rep Melo Ortega called back to schedule Botox delivery for 12/11/18 to our [facility]

## 2018-12-10 NOTE — TELEPHONE ENCOUNTER
Spoke with rep Dirk Sullivan who stated order is ready for shipment but need patient's consent prior  No one has reached out to patient  I left message for patient with [facility] phone # requesting she call and give consent  I will check back tomorrow

## 2018-12-10 NOTE — TELEPHONE ENCOUNTER
Patient called back to confirm  She is agreeable to call and give verbal consent  She confirmed appt on 12/20/18 in [place]

## 2018-12-13 DIAGNOSIS — G43.719 INTRACTABLE CHRONIC MIGRAINE WITHOUT AURA AND WITHOUT STATUS MIGRAINOSUS: ICD-10-CM

## 2018-12-13 DIAGNOSIS — G43.709 CHRONIC MIGRAINE WITHOUT AURA WITHOUT STATUS MIGRAINOSUS, NOT INTRACTABLE: ICD-10-CM

## 2018-12-13 RX ORDER — CYPROHEPTADINE HYDROCHLORIDE 4 MG/1
4 TABLET ORAL
Qty: 30 TABLET | Refills: 0 | Status: SHIPPED | OUTPATIENT
Start: 2018-12-13 | End: 2019-07-29 | Stop reason: ALTCHOICE

## 2018-12-13 RX ORDER — PROCHLORPERAZINE MALEATE 10 MG
10 TABLET ORAL EVERY 6 HOURS PRN
Qty: 10 TABLET | Refills: 0 | Status: SHIPPED | OUTPATIENT
Start: 2018-12-13 | End: 2019-06-05 | Stop reason: SDUPTHER

## 2018-12-13 RX ORDER — KETOROLAC TROMETHAMINE 10 MG/1
10 TABLET, FILM COATED ORAL 3 TIMES DAILY PRN
Qty: 10 TABLET | Refills: 0 | Status: SHIPPED | OUTPATIENT
Start: 2018-12-13 | End: 2019-07-29 | Stop reason: ALTCHOICE

## 2018-12-13 NOTE — TELEPHONE ENCOUNTER
Pt calls back to say that she still has migraine  She finished the decadron with no relief    Dull persistent pain behind both eyes on right side of face  No n/v no light/sound sensitivity  Pt has been taking maxalt with no relief and reminded not to take more than 3 a week  No otc meds taken  Pt unsure if she has ever had Depakote to break migraine       Current meds:  Olanzapine 5 mg at bed  Elavil 25 mg at bed  zonisamide 100 mg cap at bed

## 2018-12-13 NOTE — TELEPHONE ENCOUNTER
Prior tasks say that Depakote was not effective    Please have her take Toradol and prochlorperazine now and then q 8 hr x2 more doses  After that prn but only 10 per month        Cyproheptadine 4 mg at bedtime for 14 nights then as needed    All sent to pharm

## 2018-12-20 ENCOUNTER — PROCEDURE VISIT (OUTPATIENT)
Dept: NEUROLOGY | Facility: CLINIC | Age: 48
End: 2018-12-20
Payer: COMMERCIAL

## 2018-12-20 VITALS — HEART RATE: 80 BPM | SYSTOLIC BLOOD PRESSURE: 140 MMHG | TEMPERATURE: 97.5 F | DIASTOLIC BLOOD PRESSURE: 98 MMHG

## 2018-12-20 DIAGNOSIS — G43.709 CHRONIC MIGRAINE WITHOUT AURA WITHOUT STATUS MIGRAINOSUS, NOT INTRACTABLE: Primary | ICD-10-CM

## 2018-12-20 PROCEDURE — 64615 CHEMODENERV MUSC MIGRAINE: CPT | Performed by: PHYSICIAN ASSISTANT

## 2018-12-20 NOTE — PROGRESS NOTES
Chemodenervation  Date/Time: 12/20/2018 3:53 PM  Performed by: Adelaida Newell by: Arlette Souza details:     Preparation: Patient was prepped and draped in usual sterile fashion      Prepped With: Alcohol    Anesthesia (see MAR for exact dosages): Anesthesia method:  None  Procedure details:     Position:  Upright  Botox:     Botox Type:  Type A    Brand:  Botox    mL's of Botulinum Toxin:  200    Final Concentration per CC:  200 units    Needle Gauge:  30 G 2 5 inch  Procedures:     Botox Procedures: chronic headache      Indications: migraines    Injection Location:     Head / Face:  L superior cervical paraspinal, R superior cervical paraspinal, L , R , L frontalis, R frontalis, procerus, R medial occipitalis, L medial occipitalis, L temporalis, R temporalis, L superior trapezius and R superior trapezius    L  injection amount:  5 unit(s)    R  injection amount:  5 unit(s)    L medial frontalis:  10 unit(s)    R medial frontalis:  10 unit(s)    L temporalis injection amount:  20 unit(s)    R temporalis injection amount:  20 unit(s)    Procerus injection amount:  5 unit(s)    L medial occipitalis injection amount:  15 unit(s)    R medial occipitalis injection amount:  15 unit(s)    L superior cervical paraspinal injection amount:  10 unit(s)    R superior cervical paraspinal injection amount:  10 unit(s)    L superior trapezius injection amount:  15 unit(s)    R superior trapezius injection amount:  15 unit(s)  Total Units:     Total units used:  200    Total units discarded:  0  Post-procedure details:     Chemodenervation:  Chronic migraine    Facial Nerve Location[de-identified]  Bilateral facial nerve    Patient tolerance of procedure:   Tolerated well, no immediate complications  Comments:      45 units frontalis  All medically necessary

## 2018-12-31 ENCOUNTER — TRANSCRIBE ORDERS (OUTPATIENT)
Dept: LAB | Facility: CLINIC | Age: 48
End: 2018-12-31

## 2018-12-31 ENCOUNTER — APPOINTMENT (OUTPATIENT)
Dept: LAB | Facility: CLINIC | Age: 48
End: 2018-12-31
Payer: COMMERCIAL

## 2018-12-31 DIAGNOSIS — R31.0 GROSS HEMATURIA: Primary | ICD-10-CM

## 2018-12-31 DIAGNOSIS — R31.0 GROSS HEMATURIA: ICD-10-CM

## 2018-12-31 DIAGNOSIS — E03.9 HYPOTHYROIDISM, UNSPECIFIED TYPE: Primary | ICD-10-CM

## 2018-12-31 LAB
BILIRUB UR QL STRIP: NEGATIVE
CLARITY UR: CLEAR
COLOR UR: YELLOW
GLUCOSE UR STRIP-MCNC: NEGATIVE MG/DL
HGB UR QL STRIP.AUTO: NEGATIVE
KETONES UR STRIP-MCNC: NEGATIVE MG/DL
LEUKOCYTE ESTERASE UR QL STRIP: NEGATIVE
NITRITE UR QL STRIP: NEGATIVE
PH UR STRIP.AUTO: 6.5 [PH] (ref 4.5–8)
PROT UR STRIP-MCNC: NEGATIVE MG/DL
SP GR UR STRIP.AUTO: 1.01 (ref 1–1.03)
UROBILINOGEN UR QL STRIP.AUTO: 0.2 E.U./DL

## 2018-12-31 PROCEDURE — 81003 URINALYSIS AUTO W/O SCOPE: CPT | Performed by: NURSE PRACTITIONER

## 2018-12-31 PROCEDURE — 87086 URINE CULTURE/COLONY COUNT: CPT

## 2018-12-31 RX ORDER — LEVOTHYROXINE SODIUM 0.03 MG/1
25 TABLET ORAL DAILY
Qty: 90 TABLET | Refills: 1 | Status: SHIPPED | OUTPATIENT
Start: 2018-12-31 | End: 2019-06-29 | Stop reason: SDUPTHER

## 2019-01-02 LAB — BACTERIA UR CULT: NORMAL

## 2019-01-21 DIAGNOSIS — G43.719 INTRACTABLE CHRONIC MIGRAINE WITHOUT AURA AND WITHOUT STATUS MIGRAINOSUS: ICD-10-CM

## 2019-01-21 RX ORDER — ZONISAMIDE 100 MG/1
100 CAPSULE ORAL
Qty: 90 CAPSULE | Refills: 1 | Status: SHIPPED | OUTPATIENT
Start: 2019-01-21 | End: 2019-07-20 | Stop reason: SDUPTHER

## 2019-02-20 DIAGNOSIS — G43.709 CHRONIC MIGRAINE WITHOUT AURA WITHOUT STATUS MIGRAINOSUS, NOT INTRACTABLE: Primary | ICD-10-CM

## 2019-02-20 RX ORDER — AMITRIPTYLINE HYDROCHLORIDE 50 MG/1
25 TABLET, FILM COATED ORAL
Qty: 90 TABLET | Refills: 1 | Status: SHIPPED | OUTPATIENT
Start: 2019-02-20 | End: 2019-03-13 | Stop reason: DRUGHIGH

## 2019-02-20 RX ORDER — AMITRIPTYLINE HYDROCHLORIDE 25 MG/1
25 TABLET, FILM COATED ORAL
COMMUNITY
End: 2019-03-13 | Stop reason: SDUPTHER

## 2019-03-04 DIAGNOSIS — G43.719 INTRACTABLE CHRONIC MIGRAINE WITHOUT AURA AND WITHOUT STATUS MIGRAINOSUS: ICD-10-CM

## 2019-03-05 RX ORDER — RIZATRIPTAN BENZOATE 10 MG/1
10 TABLET, ORALLY DISINTEGRATING ORAL ONCE AS NEEDED
Qty: 9 TABLET | Refills: 0 | Status: SHIPPED | OUTPATIENT
Start: 2019-03-05 | End: 2019-07-22 | Stop reason: SDUPTHER

## 2019-03-05 NOTE — TELEPHONE ENCOUNTER
Refill request from [facility] for 90 day supply of Olanzapine 5 mg  Last fill 8/2018  Signed by [facility] and faxing to [phone]

## 2019-03-07 DIAGNOSIS — G43.709 CHRONIC MIGRAINE WITHOUT AURA WITHOUT STATUS MIGRAINOSUS, NOT INTRACTABLE: ICD-10-CM

## 2019-03-07 RX ORDER — OLANZAPINE 5 MG/1
5 TABLET ORAL
Qty: 90 TABLET | Refills: 1 | Status: SHIPPED | OUTPATIENT
Start: 2019-03-07 | End: 2019-04-10 | Stop reason: SINTOL

## 2019-03-08 ENCOUNTER — TELEPHONE (OUTPATIENT)
Dept: NEUROLOGY | Facility: CLINIC | Age: 49
End: 2019-03-08

## 2019-03-08 NOTE — TELEPHONE ENCOUNTER
Spoke with rep Real Thakkar and started new fill request for Botox  Check back next week for status update

## 2019-03-08 NOTE — TELEPHONE ENCOUNTER
Referral Notes   Number of Notes: 1   Type Date User Summary Attachment   General 03/08/2019 10:35 AM Monica Kowalski care coordination  -   Note    Botox- no authorization needed  Please use [facility]         Thank you!

## 2019-03-12 NOTE — TELEPHONE ENCOUNTER
Spoke with rep Shonda Berrios who stated order is processed and benefits were verified  Just awaiting patient's consent to ship  Will reach out to patient

## 2019-03-13 ENCOUNTER — OFFICE VISIT (OUTPATIENT)
Dept: FAMILY MEDICINE CLINIC | Facility: CLINIC | Age: 49
End: 2019-03-13
Payer: COMMERCIAL

## 2019-03-13 VITALS
WEIGHT: 205.4 LBS | DIASTOLIC BLOOD PRESSURE: 78 MMHG | HEIGHT: 65 IN | BODY MASS INDEX: 34.22 KG/M2 | TEMPERATURE: 98 F | SYSTOLIC BLOOD PRESSURE: 116 MMHG | HEART RATE: 80 BPM

## 2019-03-13 DIAGNOSIS — J01.00 ACUTE NON-RECURRENT MAXILLARY SINUSITIS: Primary | ICD-10-CM

## 2019-03-13 DIAGNOSIS — G43.709 CHRONIC MIGRAINE WITHOUT AURA WITHOUT STATUS MIGRAINOSUS, NOT INTRACTABLE: ICD-10-CM

## 2019-03-13 PROCEDURE — 3008F BODY MASS INDEX DOCD: CPT | Performed by: NURSE PRACTITIONER

## 2019-03-13 PROCEDURE — 99213 OFFICE O/P EST LOW 20 MIN: CPT | Performed by: NURSE PRACTITIONER

## 2019-03-13 PROCEDURE — 1036F TOBACCO NON-USER: CPT | Performed by: NURSE PRACTITIONER

## 2019-03-13 RX ORDER — CEFUROXIME AXETIL 500 MG/1
500 TABLET ORAL EVERY 12 HOURS SCHEDULED
Qty: 20 TABLET | Refills: 0 | Status: SHIPPED | OUTPATIENT
Start: 2019-03-13 | End: 2019-03-23

## 2019-03-13 RX ORDER — AMITRIPTYLINE HYDROCHLORIDE 25 MG/1
25 TABLET, FILM COATED ORAL
Qty: 90 TABLET | Refills: 2 | Status: SHIPPED | OUTPATIENT
Start: 2019-03-13 | End: 2019-10-17

## 2019-03-13 RX ORDER — PREDNISONE 10 MG/1
TABLET ORAL
Qty: 26 TABLET | Refills: 0 | Status: SHIPPED | OUTPATIENT
Start: 2019-03-13 | End: 2019-04-10

## 2019-03-13 NOTE — TELEPHONE ENCOUNTER
Spoke with rep from [facility] and scheduled delivery for Botox to [facility] location Friday 3/15/19  I will await shipment

## 2019-03-13 NOTE — PROGRESS NOTES
Assessment/Plan:     Diagnoses and all orders for this visit:    Acute non-recurrent maxillary sinusitis  -     cefuroxime (CEFTIN) 500 mg tablet; Take 1 tablet (500 mg total) by mouth every 12 (twelve) hours for 10 days  -     predniSONE 10 mg tablet; 3 tabs twice a day x 2 days, 2 tabs twice a day x2 days, 1 tab twice a day x2 days, 1 tab daily x2 days, than stop    Chronic migraine without aura without status migrainosus, not intractable  -     amitriptyline (ELAVIL) 25 mg tablet; Take 1 tablet (25 mg total) by mouth daily at bedtime        Patient instructed to call if no improvement in 72 hours or symptoms worsen      Subjective:      Patient ID: Robbie Guardado is a 57 y o  female  57 y  o female presenting with nasal congestion, bilateral clogged ears, sinus pressure and headache for the past few days  She denies fever and chills being associated with her other symptoms but as had some general body aches since start of illness  She as not used OTC medications to treat her symptoms            Family History   Problem Relation Age of Onset    Hypertension Mother     Diabetes Father     Hypertension Father      Social History     Socioeconomic History    Marital status: /Civil Union     Spouse name: Not on file    Number of children: Not on file    Years of education: Not on file    Highest education level: Not on file   Occupational History    Not on file   Social Needs    Financial resource strain: Not on file    Food insecurity:     Worry: Not on file     Inability: Not on file    Transportation needs:     Medical: Not on file     Non-medical: Not on file   Tobacco Use    Smoking status: Never Smoker    Smokeless tobacco: Never Used   Substance and Sexual Activity    Alcohol use: No    Drug use: No    Sexual activity: Not on file   Lifestyle    Physical activity:     Days per week: Not on file     Minutes per session: Not on file    Stress: Not on file   Relationships    Social connections:     Talks on phone: Not on file     Gets together: Not on file     Attends Worship service: Not on file     Active member of club or organization: Not on file     Attends meetings of clubs or organizations: Not on file     Relationship status: Not on file    Intimate partner violence:     Fear of current or ex partner: Not on file     Emotionally abused: Not on file     Physically abused: Not on file     Forced sexual activity: Not on file   Other Topics Concern    Not on file   Social History Narrative    Not on file     Past Medical History:   Diagnosis Date    Cervicalgia     disc displacement,radiculopathy    Disease of thyroid gland     Kidney stone     Migraine     Myofascial pain syndrome      Past Surgical History:   Procedure Laterality Date    BREAST BIOPSY Right     biospy w/ marker    CARPAL TUNNEL RELEASE Right     GANGLION CYST EXCISION Left 8/3/2017    Procedure: WRIST/HAND MASS EXCISION;  Surgeon: Trina Martinez MD;  Location:  MAIN OR;  Service: Orthopedics    HYSTERECTOMY       Allergies   Allergen Reactions    Levofloxacin Anaphylaxis and Swelling     Patient reports face swelled, throat was starting to close  Current Outpatient Medications:     amitriptyline (ELAVIL) 25 mg tablet, Take 1 tablet (25 mg total) by mouth daily at bedtime, Disp: 90 tablet, Rfl: 2    aspirin 81 MG tablet, Take 81 mg by mouth daily  , Disp: , Rfl:     azelastine (ASTELIN) 0 1 % nasal spray, 1 spray into each nostril 2 (two) times a day Use in each nostril as needed , Disp: , Rfl:     Botulinum Toxin Type A 200 units SOLR, INJECT UP  UNITS ONCE EVERY 3 MONTHS AS DIRECTED  IM INTO HEAD AND NECK AREA , Disp: 200 Units, Rfl: 3    Botulinum Toxin Type A 200 units SOLR, Inject up to 200 units once every 3 months   IM into head and neck area, Disp: 200 Units, Rfl: 3    cefuroxime (CEFTIN) 500 mg tablet, Take 1 tablet (500 mg total) by mouth every 12 (twelve) hours for 10 days, Disp: 20 tablet, Rfl: 0    Cholecalciferol (VITAMIN D3) 1000 units CAPS, Take by mouth, Disp: , Rfl:     clindamycin (CLEOCIN) 300 MG capsule, Take 300 mg by mouth 2 (two) times a day, Disp: , Rfl:     co-enzyme Q-10 50 MG capsule, Take 100 mg by mouth daily  , Disp: , Rfl:     coenzyme Q-10 100 MG capsule, Take 100 mg by mouth as needed, Disp: , Rfl:     cyproheptadine (PERIACTIN) 4 mg tablet, Take 1 tablet (4 mg total) by mouth daily at bedtime as needed for allergies, Disp: 30 tablet, Rfl: 0    dexamethasone (DECADRON) 2 mg tablet, Take 1 tablet (2 mg total) by mouth daily as needed (migraine) (Patient not taking: Reported on 10/3/2018 ), Disp: 5 tablet, Rfl: 0    divalproex sodium (DEPAKOTE ER) 250 mg 24 hr tablet, Take 1 tablet (250 mg total) by mouth daily at bedtime 2 tablets at bedtime for 3 days then 1 tablet at bedtime for 2 days (Patient not taking: Reported on 10/3/2018 ), Disp: 8 tablet, Rfl: 0    Glucosamine-Chondroit-Vit C-Mn (GLUCOSAMINE 1500 COMPLEX PO), Take 2 tablets by mouth daily, Disp: , Rfl:     ketorolac (TORADOL) 10 mg tablet, Take 1 tablet (10 mg total) by mouth 3 (three) times a day as needed (migraine), Disp: 10 tablet, Rfl: 0    levothyroxine 25 mcg tablet, Take 25 mcg by mouth daily  , Disp: , Rfl:     levothyroxine 25 mcg tablet, Take 1 tablet (25 mcg total) by mouth daily, Disp: 90 tablet, Rfl: 1    magnesium gluconate (MAGONATE) 500 mg tablet, Take 500 mg by mouth 2 (two) times a day , Disp: , Rfl:     morphine (MSIR) 15 mg tablet, Take 1 tablet by mouth every 4 (four) hours as needed for severe pain for up to 15 doses Max Daily Amount: 90 mg (Patient not taking: Reported on 10/3/2018 ), Disp: 15 tablet, Rfl: 0    OLANZapine (ZyPREXA) 5 mg tablet, Take 1 tablet (5 mg total) by mouth daily at bedtime, Disp: 90 tablet, Rfl: 1    POTASSIUM CITRATE ER PO, Take 99 mg by mouth daily, Disp: , Rfl:     predniSONE 10 mg tablet, 3 tabs twice a day x 2 days, 2 tabs twice a day x2 days, 1 tab twice a day x2 days, 1 tab daily x2 days, than stop, Disp: 26 tablet, Rfl: 0    prochlorperazine (COMPAZINE) 10 mg tablet, Take 1 tablet (10 mg total) by mouth every 6 (six) hours as needed for nausea or vomiting, Disp: 10 tablet, Rfl: 0    Riboflavin (VITAMIN B-2) 25 MG TABS, Take by mouth, Disp: , Rfl:     rizatriptan (MAXALT-MLT) 10 MG disintegrating tablet, Take 1 tablet (10 mg total) by mouth once as needed for migraine for up to 1 dose May repeat in 2 hours if needed, Disp: 9 tablet, Rfl: 0    tamoxifen (NOLVADEX) 20 mg tablet, Take 20 mg by mouth 2 (two) times a day , Disp: , Rfl:     zonisamide (ZONEGRAN) 100 mg capsule, Take 1 capsule (100 mg total) by mouth daily at bedtime, Disp: 90 capsule, Rfl: 1    Current Facility-Administered Medications:     prochlorperazine edisylate (COMPAZINE) injection 10 mg, 10 mg, Intramuscular, Q4H PRN, Kelechi Powers PA-C, 10 mg at 01/26/18 1517      Review of Systems   Constitutional: Negative  HENT: Positive for congestion and ear pain  Eyes: Negative  Respiratory: Positive for cough  Cardiovascular: Negative  Gastrointestinal: Negative  Musculoskeletal: Negative  Neurological: Positive for headaches  Psychiatric/Behavioral: Negative  Objective:    /78 (BP Location: Right arm, Patient Position: Sitting, Cuff Size: Standard)   Pulse 80   Temp 98 °F (36 7 °C)   Ht 5' 5" (1 651 m)   Wt 93 2 kg (205 lb 6 4 oz)   LMP 11/18/2015   BMI 34 18 kg/m² (Reviewed)     Physical Exam   Constitutional: She is oriented to person, place, and time  Vital signs are normal  She appears well-developed and well-nourished  HENT:   Head: Normocephalic and atraumatic  Right Ear: Tympanic membrane, external ear and ear canal normal    Left Ear: Tympanic membrane, external ear and ear canal normal    Nose: Mucosal edema and rhinorrhea present  Right sinus exhibits maxillary sinus tenderness  Left sinus exhibits maxillary sinus tenderness  Mouth/Throat: Uvula is midline and mucous membranes are normal  Posterior oropharyngeal erythema present  Eyes: Pupils are equal, round, and reactive to light  Conjunctivae, EOM and lids are normal    Neck: Trachea normal and normal range of motion  Cardiovascular: Normal rate, regular rhythm and normal heart sounds  Pulmonary/Chest: Effort normal and breath sounds normal    Neurological: She is alert and oriented to person, place, and time  Skin: Skin is warm and dry  Psychiatric: She has a normal mood and affect   Her behavior is normal

## 2019-03-22 ENCOUNTER — PROCEDURE VISIT (OUTPATIENT)
Dept: NEUROLOGY | Facility: CLINIC | Age: 49
End: 2019-03-22
Payer: COMMERCIAL

## 2019-03-22 VITALS — DIASTOLIC BLOOD PRESSURE: 89 MMHG | SYSTOLIC BLOOD PRESSURE: 123 MMHG | HEART RATE: 86 BPM | TEMPERATURE: 98.2 F

## 2019-03-22 DIAGNOSIS — G43.709 CHRONIC MIGRAINE WITHOUT AURA WITHOUT STATUS MIGRAINOSUS, NOT INTRACTABLE: Primary | ICD-10-CM

## 2019-03-22 PROCEDURE — 64615 CHEMODENERV MUSC MIGRAINE: CPT | Performed by: PHYSICIAN ASSISTANT

## 2019-03-22 NOTE — PROGRESS NOTES
Chemodenervation  Date/Time: 3/22/2019 3:30 PM  Performed by: Sterling Meyer PA-C  Authorized by: Sterling Meyer PA-C     Pre-procedure details:     Prepped With: Alcohol    Anesthesia (see MAR for exact dosages): Anesthesia method:  None  Procedure details:     Position:  Upright  Botox:     Botox Type:  Type A    Brand:  Botox    mL's of Botulinum Toxin:  200    Final Concentration per CC:  200 units    Needle Gauge:  30 G 2 5 inch  Procedures:     Botox Procedures: chronic headache      Indications: migraines    Injection Location:     Head / Face:  L superior cervical paraspinal, R superior cervical paraspinal, L , R , L frontalis, R frontalis, L medial occipitalis, R medial occipitalis, procerus, R temporalis, L temporalis, R superior trapezius and L superior trapezius    L  injection amount:  5 unit(s)    R  injection amount:  5 unit(s)    L lateral frontalis:  5 unit(s)    R lateral frontalis:  5 unit(s)    L medial frontalis:  5 unit(s)    R medial frontalis:  5 unit(s)    L temporalis injection amount:  20 unit(s)    R temporalis injection amount:  20 unit(s)    Procerus injection amount:  5 unit(s)    L medial occipitalis injection amount:  15 unit(s)    R medial occipitalis injection amount:  15 unit(s)    L superior cervical paraspinal injection amount:  10 unit(s)    R superior cervical paraspinal injection amount:  10 unit(s)    L superior trapezius injection amount:  15 unit(s)    R superior trapezius injection amount:  15 unit(s)  Total Units:     Total units used:  200    Total units discarded:  0  Post-procedure details:     Chemodenervation:  Chronic migraine    Facial Nerve Location[de-identified]  Bilateral facial nerve    Patient tolerance of procedure:   Tolerated well, no immediate complications  Comments:      45 units frontalis  All medically necessary

## 2019-04-10 ENCOUNTER — OFFICE VISIT (OUTPATIENT)
Dept: NEUROLOGY | Facility: CLINIC | Age: 49
End: 2019-04-10
Payer: COMMERCIAL

## 2019-04-10 VITALS
SYSTOLIC BLOOD PRESSURE: 136 MMHG | HEART RATE: 77 BPM | HEIGHT: 65 IN | BODY MASS INDEX: 33.99 KG/M2 | DIASTOLIC BLOOD PRESSURE: 92 MMHG | WEIGHT: 204 LBS

## 2019-04-10 DIAGNOSIS — T50.905A WEIGHT GAIN DUE TO MEDICATION: ICD-10-CM

## 2019-04-10 DIAGNOSIS — R63.5 WEIGHT GAIN DUE TO MEDICATION: ICD-10-CM

## 2019-04-10 DIAGNOSIS — G43.709 CHRONIC MIGRAINE WITHOUT AURA WITHOUT STATUS MIGRAINOSUS, NOT INTRACTABLE: Primary | ICD-10-CM

## 2019-04-10 PROCEDURE — 99213 OFFICE O/P EST LOW 20 MIN: CPT | Performed by: PHYSICIAN ASSISTANT

## 2019-04-29 ENCOUNTER — TELEPHONE (OUTPATIENT)
Dept: NEUROLOGY | Facility: CLINIC | Age: 49
End: 2019-04-29

## 2019-05-03 ENCOUNTER — HOSPITAL ENCOUNTER (OUTPATIENT)
Dept: MAMMOGRAPHY | Facility: CLINIC | Age: 49
Discharge: HOME | End: 2019-05-03
Payer: COMMERCIAL

## 2019-05-03 ENCOUNTER — HOSPITAL ENCOUNTER (OUTPATIENT)
Dept: ULTRASOUND IMAGING | Facility: CLINIC | Age: 49
Discharge: HOME | End: 2019-05-03
Payer: COMMERCIAL

## 2019-05-03 VITALS — WEIGHT: 204 LBS | HEIGHT: 65 IN | BODY MASS INDEX: 33.99 KG/M2

## 2019-05-03 DIAGNOSIS — R92.1 BREAST CALCIFICATION SEEN ON MAMMOGRAM: ICD-10-CM

## 2019-05-03 DIAGNOSIS — R92.8 ABNORMAL MAMMOGRAM: ICD-10-CM

## 2019-05-03 PROCEDURE — 77066 DX MAMMO INCL CAD BI: CPT

## 2019-05-03 PROCEDURE — 76642 ULTRASOUND BREAST LIMITED: CPT

## 2019-05-03 PROCEDURE — G0279 TOMOSYNTHESIS, MAMMO: HCPCS

## 2019-06-05 DIAGNOSIS — G43.719 INTRACTABLE CHRONIC MIGRAINE WITHOUT AURA AND WITHOUT STATUS MIGRAINOSUS: ICD-10-CM

## 2019-06-05 RX ORDER — PROCHLORPERAZINE MALEATE 10 MG
10 TABLET ORAL EVERY 6 HOURS PRN
Qty: 10 TABLET | Refills: 0 | Status: SHIPPED | OUTPATIENT
Start: 2019-06-05 | End: 2019-12-30 | Stop reason: SDUPTHER

## 2019-06-14 ENCOUNTER — TELEPHONE (OUTPATIENT)
Dept: NEUROLOGY | Facility: CLINIC | Age: 49
End: 2019-06-14

## 2019-06-29 DIAGNOSIS — E03.9 HYPOTHYROIDISM, UNSPECIFIED TYPE: ICD-10-CM

## 2019-07-01 RX ORDER — LEVOTHYROXINE SODIUM 0.03 MG/1
TABLET ORAL
Qty: 90 TABLET | Refills: 1 | Status: SHIPPED | OUTPATIENT
Start: 2019-07-01 | End: 2019-12-28 | Stop reason: SDUPTHER

## 2019-07-02 ENCOUNTER — PROCEDURE VISIT (OUTPATIENT)
Dept: NEUROLOGY | Facility: CLINIC | Age: 49
End: 2019-07-02
Payer: COMMERCIAL

## 2019-07-02 VITALS — HEART RATE: 72 BPM | TEMPERATURE: 98.5 F | SYSTOLIC BLOOD PRESSURE: 138 MMHG | DIASTOLIC BLOOD PRESSURE: 101 MMHG

## 2019-07-02 DIAGNOSIS — G43.709 CHRONIC MIGRAINE WITHOUT AURA WITHOUT STATUS MIGRAINOSUS, NOT INTRACTABLE: Primary | ICD-10-CM

## 2019-07-02 PROCEDURE — 64615 CHEMODENERV MUSC MIGRAINE: CPT | Performed by: PHYSICIAN ASSISTANT

## 2019-07-02 NOTE — PROGRESS NOTES
Chemodenervation  Date/Time: 7/2/2019 10:47 AM  Performed by: Boby Talbot PA-C  Authorized by: Boby Talbot PA-C     Pre-procedure details:     Prepped With: Alcohol    Anesthesia (see MAR for exact dosages): Anesthesia method:  None  Procedure details:     Position:  Upright  Botox:     Botox Type:  Type A    Brand:  Botox    mL's of Botulinum Toxin:  200    Final Concentration per CC:  200 units    Needle Gauge:  30 G 2 5 inch  Procedures:     Botox Procedures: chronic headache      Indications: migraines    Injection Location:     Head / Face:  L superior cervical paraspinal, R superior cervical paraspinal, L , R , L frontalis, R frontalis, L medial occipitalis, R medial occipitalis, procerus, R temporalis, L temporalis, R superior trapezius and L superior trapezius    L  injection amount:  5 unit(s)    R  injection amount:  5 unit(s)    L lateral frontalis:  5 unit(s)    R lateral frontalis:  5 unit(s)    L medial frontalis:  5 unit(s)    R medial frontalis:  5 unit(s)    L temporalis injection amount:  20 unit(s)    R temporalis injection amount:  20 unit(s)    Procerus injection amount:  5 unit(s)    L medial occipitalis injection amount:  15 unit(s)    R medial occipitalis injection amount:  15 unit(s)    L superior cervical paraspinal injection amount:  10 unit(s)    R superior cervical paraspinal injection amount:  10 unit(s)    L superior trapezius injection amount:  15 unit(s)    R superior trapezius injection amount:  15 unit(s)  Total Units:     Total units used:  200    Total units discarded:  0  Post-procedure details:     Chemodenervation:  Chronic migraine    Facial Nerve Location[de-identified]  Bilateral facial nerve    Patient tolerance of procedure:   Tolerated well, no immediate complications  Comments:      45 units frontalis  All medically necessary

## 2019-07-20 DIAGNOSIS — G43.719 INTRACTABLE CHRONIC MIGRAINE WITHOUT AURA AND WITHOUT STATUS MIGRAINOSUS: ICD-10-CM

## 2019-07-22 DIAGNOSIS — G43.719 INTRACTABLE CHRONIC MIGRAINE WITHOUT AURA AND WITHOUT STATUS MIGRAINOSUS: ICD-10-CM

## 2019-07-22 RX ORDER — RIZATRIPTAN BENZOATE 10 MG/1
TABLET, ORALLY DISINTEGRATING ORAL
Qty: 9 TABLET | Refills: 0 | Status: SHIPPED | OUTPATIENT
Start: 2019-07-22 | End: 2019-12-30 | Stop reason: SDUPTHER

## 2019-07-22 RX ORDER — ZONISAMIDE 100 MG/1
CAPSULE ORAL
Qty: 90 CAPSULE | Refills: 1 | Status: SHIPPED | OUTPATIENT
Start: 2019-07-22 | End: 2020-01-21

## 2019-07-29 ENCOUNTER — OFFICE VISIT (OUTPATIENT)
Dept: FAMILY MEDICINE CLINIC | Facility: CLINIC | Age: 49
End: 2019-07-29
Payer: COMMERCIAL

## 2019-07-29 VITALS
HEIGHT: 65 IN | BODY MASS INDEX: 31.82 KG/M2 | DIASTOLIC BLOOD PRESSURE: 88 MMHG | TEMPERATURE: 98.6 F | HEART RATE: 74 BPM | SYSTOLIC BLOOD PRESSURE: 130 MMHG | WEIGHT: 191 LBS

## 2019-07-29 DIAGNOSIS — E78.00 HYPERCHOLESTEROLEMIA: ICD-10-CM

## 2019-07-29 DIAGNOSIS — I10 ESSENTIAL HYPERTENSION: ICD-10-CM

## 2019-07-29 DIAGNOSIS — F41.9 ANXIETY: Primary | ICD-10-CM

## 2019-07-29 PROCEDURE — 99214 OFFICE O/P EST MOD 30 MIN: CPT | Performed by: FAMILY MEDICINE

## 2019-07-29 RX ORDER — LISINOPRIL 5 MG/1
5 TABLET ORAL DAILY
Qty: 90 TABLET | Refills: 3 | Status: SHIPPED | OUTPATIENT
Start: 2019-07-29 | End: 2019-08-05

## 2019-07-29 RX ORDER — ALPRAZOLAM 0.25 MG/1
0.25 TABLET ORAL 3 TIMES DAILY PRN
Qty: 60 TABLET | Refills: 0 | Status: SHIPPED | OUTPATIENT
Start: 2019-07-29 | End: 2019-09-28 | Stop reason: SDUPTHER

## 2019-07-29 NOTE — PROGRESS NOTES
BMI Counseling: Body mass index is 31 78 kg/m²  Discussed the patient's BMI with her  The BMI is above average  BMI counseling and education was provided to the patient  Exercise recommendations include exercising 3-5 times per week  Patient ID: Elfego Herman is a 57 y o  female  HPI: 57 y  o female presents to discuss blood pressure being consistently elevated for past 2-3 weeks  It was first noticed at her neurologist's appt  Her diastolic has been running in low 90's at home  She denies any headaches, visual changes or dizziness  She is also feeling intermittently anxious, since her oldest son joined the [facility]  She is looking for something to take on an as needed basis        SUBJECTIVE    Family History   Problem Relation Age of Onset   Areta Emmer Hypertension Mother    Aresuzi Schumacherer Other Mother         Bladder surgery    Diabetes Father     Hypertension Father     Leukemia Sister     No Known Problems Maternal Aunt     No Known Problems Maternal Aunt     Cancer Maternal Grandmother         Bladder    Dementia Maternal Grandfather     Leukemia Paternal Grandmother      Social History     Socioeconomic History    Marital status: /Civil Union     Spouse name: Not on file    Number of children: Not on file    Years of education: Not on file    Highest education level: Not on file   Occupational History    Not on file   Social Needs    Financial resource strain: Not on file    Food insecurity:     Worry: Not on file     Inability: Not on file    Transportation needs:     Medical: Not on file     Non-medical: Not on file   Tobacco Use    Smoking status: Never Smoker    Smokeless tobacco: Never Used   Substance and Sexual Activity    Alcohol use: No     Comment: per [employer]-drinks socially    Drug use: No    Sexual activity: Not on file   Lifestyle    Physical activity:     Days per week: Not on file     Minutes per session: Not on file    Stress: Not on file   Relationships    Social connections:     Talks on phone: Not on file     Gets together: Not on file     Attends Protestant service: Not on file     Active member of club or organization: Not on file     Attends meetings of clubs or organizations: Not on file     Relationship status: Not on file    Intimate partner violence:     Fear of current or ex partner: Not on file     Emotionally abused: Not on file     Physically abused: Not on file     Forced sexual activity: Not on file   Other Topics Concern    Not on file   Social History Narrative    Denied coffee, cola, tea consumption per [employer]     Past Medical History:   Diagnosis Date    Cervicalgia     disc displacement,radiculopathy    Depression     Disease of thyroid gland     Hypertension     last assessed: 8/2/2016    Kidney stone     Migraine     Myofascial pain syndrome      Past Surgical History:   Procedure Laterality Date    BREAST BIOPSY Right     biospy w/ marker    CARPAL TUNNEL RELEASE Right     GANGLION CYST EXCISION Left 8/3/2017    Procedure: WRIST/HAND MASS EXCISION;  Surgeon: Meena Skaggs MD;  Location: [facility] OR;  Service: Orthopedics    HYSTERECTOMY       Allergies   Allergen Reactions    Levofloxacin Anaphylaxis and Swelling     Patient reports face swelled, throat was starting to close  Current Outpatient Medications:     amitriptyline (ELAVIL) 25 mg tablet, Take 1 tablet (25 mg total) by mouth daily at bedtime, Disp: 90 tablet, Rfl: 2    aspirin 81 MG tablet, Take 81 mg by mouth daily  , Disp: , Rfl:     Botulinum Toxin Type A 200 units SOLR, INJECT UP  UNITS ONCE EVERY 3 MONTHS AS DIRECTED   IM INTO HEAD AND NECK AREA , Disp: 200 Units, Rfl: 3    Cholecalciferol (VITAMIN D3) 1000 units CAPS, Take by mouth, Disp: , Rfl:     coenzyme Q-10 100 MG capsule, Take 100 mg by mouth as needed, Disp: , Rfl:     dexamethasone (DECADRON) 2 mg tablet, Take 1 tablet (2 mg total) by mouth daily as needed (migraine), Disp: 5 tablet, Rfl: 0    Glucosamine-Chondroit-Vit C-Mn (GLUCOSAMINE 1500 COMPLEX PO), Take 2 tablets by mouth daily, Disp: , Rfl:     levothyroxine 25 mcg tablet, TAKE 1 TABLET DAILY, Disp: 90 tablet, Rfl: 1    magnesium gluconate (MAGONATE) 500 mg tablet, Take 500 mg by mouth 2 (two) times a day , Disp: , Rfl:     POTASSIUM CITRATE ER PO, Take 99 mg by mouth daily, Disp: , Rfl:     prochlorperazine (COMPAZINE) 10 mg tablet, Take 1 tablet (10 mg total) by mouth every 6 (six) hours as needed for nausea or vomiting, Disp: 10 tablet, Rfl: 0    Riboflavin (VITAMIN B-2) 25 MG TABS, Take by mouth, Disp: , Rfl:     rizatriptan (MAXALT-MLT) 10 MG disintegrating tablet, May repeat in 2 hours if needed, Disp: 9 tablet, Rfl: 0    zonisamide (ZONEGRAN) 100 mg capsule, TAKE 1 CAPSULE DAILY AT BEDTIME, Disp: 90 capsule, Rfl: 1    ALPRAZolam (XANAX) 0 25 mg tablet, Take 1 tablet (0 25 mg total) by mouth 3 (three) times a day as needed for anxiety for up to 30 days, Disp: 60 tablet, Rfl: 0    lisinopril (ZESTRIL) 5 mg tablet, Take 1 tablet (5 mg total) by mouth daily, Disp: 90 tablet, Rfl: 3    Current Facility-Administered Medications:     prochlorperazine edisylate (COMPAZINE) injection 10 mg, 10 mg, Intramuscular, Q4H PRN, Evelyn Diane PA-C, 10 mg at 01/26/18 1517    Review of Systems  Constitutional:     Denies fever, chills ,fatigue ,weakness ,weight loss, weight gain     ENT: Denies earache ,loss of hearing ,nosebleed, nasal discharge,nasal congestion ,sore throat ,hoarseness  Pulmonary: Denies shortness of breath ,cough  ,dyspnea on exertion, orthopnea  ,PND   Cardiovascular:  Denies bradycardia , tachycardia  ,palpations, lower extremity edema leg, claudication  Breast:  Denies new or changing breast lumps ,nipple discharge ,nipple changes  Abdomen:  Denies abdominal pain , anorexia , indigestion, nausea, vomiting, constipation, diarrhea  Musculoskeletal: Denies myalgias, arthralgias, joint swelling, joint stiffness , limb pain, limb swelling  Gu: denies dysuria, polyuria  Skin: Denies skin rash, skin lesion, skin wound, itching, dry skin  Neuro: Denies headache, numbness, tingling, confusion, loss of consciousness, dizziness, vertigo  Psychiatric: Denies feelings of depression, suicidal ideation,  sleep disturbances+ intermittent anxiety    OBJECTIVE  /88   Pulse 74   Temp 98 6 °F (37 °C)   Ht 5' 5" (1 651 m)   Wt 86 6 kg (191 lb)   LMP 11/18/2015   BMI 31 78 kg/m²   Constitutional:   NAD, well appearing and well nourished      ENT:   Conjunctiva and lids: no injection, edema, or discharge     Pupils and iris: CHELSEA bilaterally    External inspection of ears and nose: normal without deformities or discharge  Otoscopic exam: Canals patent without erythema  Nasal mucosa, septum and turbinates: Normal or edema or discharge         Oropharynx:  Moist mucosa, normal tongue and tonsils without lesions  No erythema        Pulmonary:Respiratory effort normal rate and rhythm, no increased work of breathing   Auscultation of lungs:  Clear bilaterally with no adventitious breath sounds       Cardiovascular: regular rate and rhythm, S1 and S2, no murmur, no edema and/or varicosities of LE      Abdomen: Soft and non-distended     Positive bowel sounds      No heptomegaly or splenomegaly      Gu: no suprapubic tenderness or CVA tenderness, no urethral discharge  Lymphatic:  No anterior or posterior cervical lymphadenopathy         Musculoskeletal:  Gait and station: Normal gait      Digits and nails normal without clubbing or cyanosis       Inspection/palpation of joints, bones, and muscles:  No joint tenderness, swelling, full active and passive range of motion       Skin: Normal skin turgor and no rashes      Neuro:       Normal reflexes     Psych:   alert and oriented to person, place and time     normal mood and affect       Assessment/Plan:Diagnoses and all orders for this visit:    Anxiety  -     ALPRAZolam (XANAX) 0 25 mg tablet; Take 1 tablet (0 25 mg total) by mouth 3 (three) times a day as needed for anxiety for up to 30 days    Essential hypertension  Comments: Will recheck her bp in 2 weeks and she is to bring her home monitor with her next time so readings can be calibrated  Orders:  -     lisinopril (ZESTRIL) 5 mg tablet; Take 1 tablet (5 mg total) by mouth daily  -     Comprehensive metabolic panel; Future    Hypercholesterolemia  -     Lipid Panel with Direct LDL reflex; Future        Reviewed with patient plan to treat with above plan      Patient instructed to call in 72 hours if not feeling better or if symptoms worsen

## 2019-08-03 ENCOUNTER — TELEPHONE (OUTPATIENT)
Dept: OTHER | Facility: OTHER | Age: 49
End: 2019-08-03

## 2019-08-03 ENCOUNTER — HOSPITAL ENCOUNTER (EMERGENCY)
Facility: HOSPITAL | Age: 49
Discharge: HOME | End: 2019-08-04
Attending: EMERGENCY MEDICINE | Admitting: EMERGENCY MEDICINE
Payer: COMMERCIAL

## 2019-08-03 DIAGNOSIS — G43.909 MIGRAINE: Primary | ICD-10-CM

## 2019-08-03 PROCEDURE — 99284 EMERGENCY DEPT VISIT MOD MDM: CPT | Performed by: EMERGENCY MEDICINE

## 2019-08-03 PROCEDURE — 96375 TX/PRO/DX INJ NEW DRUG ADDON: CPT

## 2019-08-03 PROCEDURE — 99283 EMERGENCY DEPT VISIT LOW MDM: CPT

## 2019-08-03 PROCEDURE — 96365 THER/PROPH/DIAG IV INF INIT: CPT

## 2019-08-03 RX ORDER — KETOROLAC TROMETHAMINE 30 MG/ML
15 INJECTION, SOLUTION INTRAMUSCULAR; INTRAVENOUS ONCE
Status: COMPLETED | OUTPATIENT
Start: 2019-08-03 | End: 2019-08-03

## 2019-08-03 RX ORDER — METOCLOPRAMIDE HYDROCHLORIDE 5 MG/ML
5 INJECTION INTRAMUSCULAR; INTRAVENOUS ONCE
Status: COMPLETED | OUTPATIENT
Start: 2019-08-03 | End: 2019-08-03

## 2019-08-03 RX ORDER — MAGNESIUM SULFATE HEPTAHYDRATE 40 MG/ML
2 INJECTION, SOLUTION INTRAVENOUS ONCE
Status: COMPLETED | OUTPATIENT
Start: 2019-08-03 | End: 2019-08-04

## 2019-08-03 RX ORDER — DIPHENHYDRAMINE HYDROCHLORIDE 50 MG/ML
25 INJECTION INTRAMUSCULAR; INTRAVENOUS ONCE
Status: COMPLETED | OUTPATIENT
Start: 2019-08-03 | End: 2019-08-03

## 2019-08-03 RX ADMIN — SODIUM CHLORIDE 1000 ML: 0.9 INJECTION, SOLUTION INTRAVENOUS at 23:23

## 2019-08-03 RX ADMIN — METOCLOPRAMIDE 5 MG: 5 INJECTION, SOLUTION INTRAMUSCULAR; INTRAVENOUS at 22:21

## 2019-08-03 RX ADMIN — DIPHENHYDRAMINE HYDROCHLORIDE 25 MG: 50 INJECTION, SOLUTION INTRAMUSCULAR; INTRAVENOUS at 22:21

## 2019-08-03 RX ADMIN — MAGNESIUM SULFATE HEPTAHYDRATE 2 G: 40 INJECTION, SOLUTION INTRAVENOUS at 23:24

## 2019-08-03 RX ADMIN — KETOROLAC TROMETHAMINE 15 MG: 30 INJECTION, SOLUTION INTRAMUSCULAR at 22:22

## 2019-08-04 VITALS
BODY MASS INDEX: 32.03 KG/M2 | TEMPERATURE: 98.3 F | HEIGHT: 65 IN | SYSTOLIC BLOOD PRESSURE: 152 MMHG | WEIGHT: 192.24 LBS | RESPIRATION RATE: 16 BRPM | DIASTOLIC BLOOD PRESSURE: 95 MMHG | OXYGEN SATURATION: 98 % | HEART RATE: 61 BPM

## 2019-08-04 NOTE — ED PROVIDER NOTES
History  Chief Complaint   Patient presents with    Headache - Recurrent or Known Dx Migraines     Pt presents to ED from home w/ migraine for 5 days  Pt (+) hx migraines, states like previous migraines  Pt (+) N/V, photosensitivity  HPI   58 y/o female with h/o migraine presents with c/o headache started on Tuesday, she says its very severe started on right frontal side first then involved the left side  She says its pulsatile sometimes, She is sensitive to light, sound associated with nausea  She also reports this headache is very similar to the Migraine that she gets, one of the aggravating factor is stress  Denies vomiting, neck stiffness,  Fever, h/o blood clot, visual disturbance, blurry vision  She takes Rizatriptan as an abortive migraine medication she has taken Rizatriptan twice since Tuesday without much relief  She also take Botox last injection was on July 2nd  Her migraines usually varies about 2-3 times  in 3 months  Prior to Admission Medications   Prescriptions Last Dose Informant Patient Reported? Taking? ALPRAZolam (XANAX) 0 25 mg tablet   No No   Sig: Take 1 tablet (0 25 mg total) by mouth 3 (three) times a day as needed for anxiety for up to 30 days   Botulinum Toxin Type A 200 units SOLR  Self No No   Sig: INJECT UP  UNITS ONCE EVERY 3 MONTHS AS DIRECTED  IM INTO HEAD AND NECK AREA  Cholecalciferol (VITAMIN D3) 1000 units CAPS  Self Yes No   Sig: Take by mouth   Glucosamine-Chondroit-Vit C-Mn (GLUCOSAMINE 1500 COMPLEX PO)  Self Yes No   Sig: Take 2 tablets by mouth daily   POTASSIUM CITRATE ER PO  Self Yes No   Sig: Take 99 mg by mouth daily   Riboflavin (VITAMIN B-2) 25 MG TABS  Self Yes No   Sig: Take by mouth   amitriptyline (ELAVIL) 25 mg tablet  Self No No   Sig: Take 1 tablet (25 mg total) by mouth daily at bedtime   aspirin 81 MG tablet  Self Yes No   Sig: Take 81 mg by mouth daily     coenzyme Q-10 100 MG capsule  Self Yes No   Sig: Take 100 mg by mouth as needed   dexamethasone (DECADRON) 2 mg tablet  Self No No   Sig: Take 1 tablet (2 mg total) by mouth daily as needed (migraine)   levothyroxine 25 mcg tablet  Self No No   Sig: TAKE 1 TABLET DAILY   lisinopril (ZESTRIL) 5 mg tablet   No No   Sig: Take 1 tablet (5 mg total) by mouth daily   magnesium gluconate (MAGONATE) 500 mg tablet  Self Yes No   Sig: Take 500 mg by mouth 2 (two) times a day  prochlorperazine (COMPAZINE) 10 mg tablet  Self No No   Sig: Take 1 tablet (10 mg total) by mouth every 6 (six) hours as needed for nausea or vomiting   rizatriptan (MAXALT-MLT) 10 MG disintegrating tablet  Self No No   Sig: May repeat in 2 hours if needed   zonisamide (ZONEGRAN) 100 mg capsule  Self No No   Sig: TAKE 1 CAPSULE DAILY AT BEDTIME      Facility-Administered Medications Last Administration Doses Remaining   prochlorperazine edisylate (COMPAZINE) injection 10 mg 1/26/2018  3:17 PM           Past Medical History:   Diagnosis Date    Cervicalgia     disc displacement,radiculopathy    Depression     Disease of thyroid gland     Hypertension     last assessed: 8/2/2016    Kidney stone     Migraine     Myofascial pain syndrome        Past Surgical History:   Procedure Laterality Date    BREAST BIOPSY Right     biospy w/ marker    CARPAL TUNNEL RELEASE Right     GANGLION CYST EXCISION Left 8/3/2017    Procedure: WRIST/HAND MASS EXCISION;  Surgeon: Sebastien Oleary MD;  [facility];  Service: Orthopedics    HYSTERECTOMY         Family History   Problem Relation Age of Onset    Hypertension Mother     Other Mother         Bladder surgery    Diabetes Father     Hypertension Father     Leukemia Sister     No Known Problems Maternal Aunt     No Known Problems Maternal Aunt     Cancer Maternal Grandmother         Bladder    Dementia Maternal Grandfather     Leukemia Paternal Grandmother      I have reviewed and agree with the history as documented      Social History     Tobacco Use    Smoking status: Never Smoker    Smokeless tobacco: Never Used   Substance Use Topics    Alcohol use: No     Comment: per Allscripts-drinks socially    Drug use: No        Review of Systems   Constitutional: Negative for fever  HENT: Negative for trouble swallowing  Eyes: Positive for photophobia  Negative for pain and visual disturbance  Respiratory: Negative for cough, chest tightness and shortness of breath  Cardiovascular: Negative for chest pain  Gastrointestinal: Positive for nausea  Negative for abdominal pain and vomiting  Musculoskeletal: Negative for back pain, neck pain and neck stiffness  Skin: Negative for color change  Neurological: Positive for headaches  Negative for dizziness, facial asymmetry, weakness and numbness  Psychiatric/Behavioral: Negative for confusion  Physical Exam  ED Triage Vitals [08/03/19 2137]   Temperature Pulse Respirations Blood Pressure SpO2   98 3 °F (36 8 °C) 74 16 (!) 162/112 98 %      Temp Source Heart Rate Source Patient Position - Orthostatic VS BP Location FiO2 (%)   Oral Monitor Sitting Right arm --      Pain Score       Worst Possible Pain             Orthostatic Vital Signs  Vitals:    08/03/19 2137   BP: (!) 162/112   Pulse: 74   Patient Position - Orthostatic VS: Sitting       Physical Exam   Constitutional: She is oriented to person, place, and time  She appears well-developed and well-nourished  HENT:   Head: Normocephalic and atraumatic  Eyes: Pupils are equal, round, and reactive to light  EOM are normal    Neck: Normal range of motion  Cardiovascular: Normal rate and normal heart sounds  Pulmonary/Chest: Effort normal and breath sounds normal    Abdominal: Soft  Bowel sounds are normal    Musculoskeletal: Normal range of motion  Neurological: She is alert and oriented to person, place, and time  No cranial nerve deficit or sensory deficit  She exhibits normal muscle tone  Skin: Skin is warm         ED Medications  Medications   sodium chloride 0 9 % bolus 1,000 mL (1,000 mL Intravenous New Bag 8/3/19 2323)   magnesium sulfate 2 g/50 mL IVPB (premix) 2 g (2 g Intravenous New Bag 8/3/19 2324)   diphenhydrAMINE (BENADRYL) injection 25 mg (25 mg Intravenous Given 8/3/19 2221)   metoclopramide (REGLAN) injection 5 mg (5 mg Intravenous Given 8/3/19 2221)   ketorolac (TORADOL) injection 15 mg (15 mg Intravenous Given 8/3/19 2222)       Diagnostic Studies  Results Reviewed     None                 No orders to display         Procedures  Procedures        ED Course                               MDM  Number of Diagnoses or Management Options  Migraine:   Diagnosis management comments: 56 y/o female with h/o Migraine presents with Headache    Differentials-   Migraine   Meningitis   Subarachnoid hemorrhage   Intra cranial bleeding   Stroke   Intracranial aneurysm   Cavernous sinus thrombosis    Likely she has Migraine headache  She was given Migraine cocktail- Toradol, Benadryl, Reglan  She feels better after the meds and safe to discharge   To follow up with her Neurologist as needed  Amount and/or Complexity of Data Reviewed  Decide to obtain previous medical records or to obtain history from someone other than the patient: yes  Obtain history from someone other than the patient: yes  Review and summarize past medical records: yes  Discuss the patient with other providers: yes  Independent visualization of images, tracings, or specimens: yes        Disposition  Final diagnoses:   Migraine     Time reflects when diagnosis was documented in both MDM as applicable and the Disposition within this note     Time User Action Codes Description Comment    8/3/2019 11:42 PM [address] [place] [address], [address] [place] [address] [U85 [id number]] Migraine       ED Disposition     ED Disposition Condition Date/Time Comment    Discharge Stable Sat Aug 3, 2019 11:42 PM Rhonda Mejia discharge to home/self care              Follow-up Information     Follow up With Specialties Details Why Contact Info    Marko Feliz MD Neurology Schedule an appointment as soon as possible for a visit  As needed, If symptoms worsen [facility] [address]  [phone]            Patient's Medications   Discharge Prescriptions    No medications on file     No discharge procedures on file  ED Provider  Attending physically available and evaluated Misha Mcbride I managed the patient along with the ED Attending      Electronically Signed by         Lindsay Felix MD  08/03/19 0093

## 2019-08-04 NOTE — TELEPHONE ENCOUNTER
Colby Garnica 1970  CONFIDENTIALTY NOTICE: This fax transmission is intended only for the addressee  It contains information that is legally privileged,  confidential or otherwise protected from use or disclosure  If you are not the intended recipient, you are strictly prohibited from reviewing,  disclosing, copying using or disseminating any of this information or taking any action in reliance on or regarding this information  If you have  received this fax in error, please notify us immediately by telephone so that we can arrange for its return to us  Page: 1 of 2  Call Id: [id number]  Health Call  Standard Call Report  Health Call  Patient Name: Colby Garnica  Gender: Female  : 1970  Age: 57 Y 6 M 25 D  Return Phone  Number: [phone] (Current)  Address: [address] [place] [address]  City/State/[facility]: [place] [address]  Practice Name: [id number]  Practice Charged:  Physician:  0 [facility] Name:  Relationship To  Patient: Self  Return Phone Number: [phone] (Current)  Presenting Problem: "I have had a severe migraine since  Tuesday and my medication is not  working  Also, my b/p last night my  135/96 "  Service Type: Triage  Charged Service 1: Ginette Best U  38  Name and  Number:  Nurse Assessment  Nurse: LÁZARO Noriega, Andres Ruiz Date/Time: 8/3/2019 8:48:09 PM  Type of assessment required:  ---General (Adult or Child)  Duration of Current S/S  ---Tuesday  Location/Radiation  ---Head  Temperature (F) and route:  ---N/A  Symptom Specific Meds (Dose/Time):  ---Rizatriptan x 2  Other S/S  ---Migraine , /96  Pain Scale on scale of 1-10, 10 being the worst:  ---13-14/10  Symptom progression:  ---worse  Intake and Output  ---WNL/WNL  Colby Garnica 1970  CONFIDENTIALTY NOTICE: This fax transmission is intended only for the addressee  It contains information that is legally privileged,  confidential or otherwise protected from use or disclosure   If you are not the intended recipient, you are strictly prohibited from reviewing,  disclosing, copying using or disseminating any of this information or taking any action in reliance on or regarding this information  If you have  received this fax in error, please notify us immediately by telephone so that we can arrange for its return to us  Page: 2 of 2  Call Id: [id number]  Nurse Assessment  LMP/ Pregnancy:  ---N/A  Breastfeeding  ---No  Last Exam/Treatment:  ---7/29/2019 HTN  Protocols  Protocol Title Nurse Date/Time  Headache LÁZARO Cardenas [place] 8/3/2019 8:52:28 PM  Question Caller Affirmed  Disp  Time Disposition Final User  8/3/2019 8:53:38 PM Go to ED Now (or PCP triage) LÁZARO Noriega [place]  8/3/2019 8:53:47 PM RN Triaged Yes LÁZARO Cardenas, [facility] Advice Given Per Protocol  GO TO ED NOW (OR PCP TRIAGE): * IF NO PCP TRIAGE: You need to be seen  Go to the [facility]  within the next hour  Leave as soon as you can  PAIN MEDICINES: * For pain relief, take acetaminophen, ibuprofen, or naproxen  *  Use the lowest amount that makes your pain feel better  ACETAMINOPHEN (E G , TYLENOL): * Take 650 mg (two 325 mg pills) by  mouth every 4-6 hours as needed  Each Regular Strength Tylenol pill has 325 mg of acetaminophen  The most you should take each day  is 3,250 mg (10 Regular Strength pills a day)  * Another choice is to take 1,000 mg (two 500 mg pills) every 8 hours as needed  Each  Extra Strength Tylenol pill has 500 mg of acetaminophen  The most you should take each day is 3,000 mg (6 Extra Strength pills a day)  DRIVING: Another adult should drive  CARE ADVICE given per Headache (Adult) guideline    Caller Understands: Yes  Caller Disagree/Comply: Comply  PreDisposition: Unsure

## 2019-08-05 ENCOUNTER — OFFICE VISIT (OUTPATIENT)
Dept: FAMILY MEDICINE CLINIC | Facility: CLINIC | Age: 49
End: 2019-08-05
Payer: COMMERCIAL

## 2019-08-05 VITALS
BODY MASS INDEX: 32.32 KG/M2 | WEIGHT: 194 LBS | HEART RATE: 72 BPM | HEIGHT: 65 IN | TEMPERATURE: 98.6 F | SYSTOLIC BLOOD PRESSURE: 130 MMHG | DIASTOLIC BLOOD PRESSURE: 92 MMHG

## 2019-08-05 DIAGNOSIS — I10 ESSENTIAL HYPERTENSION: ICD-10-CM

## 2019-08-05 DIAGNOSIS — G43.809 OTHER MIGRAINE WITHOUT STATUS MIGRAINOSUS, NOT INTRACTABLE: Primary | ICD-10-CM

## 2019-08-05 PROCEDURE — 3008F BODY MASS INDEX DOCD: CPT | Performed by: FAMILY MEDICINE

## 2019-08-05 PROCEDURE — 99213 OFFICE O/P EST LOW 20 MIN: CPT | Performed by: FAMILY MEDICINE

## 2019-08-05 RX ORDER — LISINOPRIL 5 MG/1
10 TABLET ORAL DAILY
Qty: 90 TABLET | Refills: 3
Start: 2019-08-05 | End: 2019-08-14 | Stop reason: DRUGHIGH

## 2019-08-05 RX ORDER — BUTALBITAL, ACETAMINOPHEN AND CAFFEINE 50; 325; 40 MG/1; MG/1; MG/1
1 TABLET ORAL EVERY 4 HOURS PRN
Qty: 30 TABLET | Refills: 1 | Status: SHIPPED | OUTPATIENT
Start: 2019-08-05 | End: 2020-09-08 | Stop reason: SDUPTHER

## 2019-08-05 NOTE — PROGRESS NOTES
Patient ID: Brady Denver is a 57 y o  female  HPI: 57 y  o female presents after an ER visit for hypertension this weekend and accompanying migraine  She has a headache today, mild, and bp is mildly elevated       SUBJECTIVE    Family History   Problem Relation Age of Onset   Jennifer Rendon Hypertension Mother    Jennifer Rendon Other Mother         Bladder surgery    Diabetes Father     Hypertension Father     Leukemia Sister     No Known Problems Maternal Aunt     No Known Problems Maternal Aunt     Cancer Maternal Grandmother         Bladder    Dementia Maternal Grandfather     Leukemia Paternal Grandmother      Social History     Socioeconomic History    Marital status: /Civil Union     Spouse name: Not on file    Number of children: Not on file    Years of education: Not on file    Highest education level: Not on file   Occupational History    Not on file   Social Needs    Financial resource strain: Not on file    Food insecurity:     Worry: Not on file     Inability: Not on file    Transportation needs:     Medical: Not on file     Non-medical: Not on file   Tobacco Use    Smoking status: Never Smoker    Smokeless tobacco: Never Used   Substance and Sexual Activity    Alcohol use: No     Comment: per [employer]-drinks socially    Drug use: No    Sexual activity: Not on file   Lifestyle    Physical activity:     Days per week: Not on file     Minutes per session: Not on file    Stress: Not on file   Relationships    Social connections:     Talks on phone: Not on file     Gets together: Not on file     Attends Mandaen service: Not on file     Active member of club or organization: Not on file     Attends meetings of clubs or organizations: Not on file     Relationship status: Not on file    Intimate partner violence:     Fear of current or ex partner: Not on file     Emotionally abused: Not on file     Physically abused: Not on file     Forced sexual activity: Not on file   Other Topics Concern    Not on file   Social History Narrative    Denied coffee, cola, tea consumption per [employer]     Past Medical History:   Diagnosis Date    Cervicalgia     disc displacement,radiculopathy    Depression     Disease of thyroid gland     Hypertension     last assessed: 8/2/2016    Kidney stone     Migraine     Myofascial pain syndrome      Past Surgical History:   Procedure Laterality Date    BREAST BIOPSY Right     biospy w/ marker    CARPAL TUNNEL RELEASE Right     GANGLION CYST EXCISION Left 8/3/2017    Procedure: WRIST/HAND MASS EXCISION;  Surgeon: Daniele Guillory MD;  [facility];  Service: Orthopedics    HYSTERECTOMY       Allergies   Allergen Reactions    Levofloxacin Anaphylaxis and Swelling     Patient reports face swelled, throat was starting to close  Current Outpatient Medications:     ALPRAZolam (XANAX) 0 25 mg tablet, Take 1 tablet (0 25 mg total) by mouth 3 (three) times a day as needed for anxiety for up to 30 days, Disp: 60 tablet, Rfl: 0    amitriptyline (ELAVIL) 25 mg tablet, Take 1 tablet (25 mg total) by mouth daily at bedtime, Disp: 90 tablet, Rfl: 2    aspirin 81 MG tablet, Take 81 mg by mouth daily  , Disp: , Rfl:     Botulinum Toxin Type A 200 units SOLR, INJECT UP  UNITS ONCE EVERY 3 MONTHS AS DIRECTED   IM INTO HEAD AND NECK AREA , Disp: 200 Units, Rfl: 3    Cholecalciferol (VITAMIN D3) 1000 units CAPS, Take by mouth, Disp: , Rfl:     coenzyme Q-10 100 MG capsule, Take 100 mg by mouth as needed, Disp: , Rfl:     dexamethasone (DECADRON) 2 mg tablet, Take 1 tablet (2 mg total) by mouth daily as needed (migraine), Disp: 5 tablet, Rfl: 0    Glucosamine-Chondroit-Vit C-Mn (GLUCOSAMINE 1500 COMPLEX PO), Take 2 tablets by mouth daily, Disp: , Rfl:     levothyroxine 25 mcg tablet, TAKE 1 TABLET DAILY, Disp: 90 tablet, Rfl: 1    lisinopril (ZESTRIL) 5 mg tablet, Take 2 tablets (10 mg total) by mouth daily, Disp: 90 tablet, Rfl: 3    magnesium gluconate (MAGONATE) 500 mg tablet, Take 500 mg by mouth 2 (two) times a day , Disp: , Rfl:     POTASSIUM CITRATE ER PO, Take 99 mg by mouth daily, Disp: , Rfl:     prochlorperazine (COMPAZINE) 10 mg tablet, Take 1 tablet (10 mg total) by mouth every 6 (six) hours as needed for nausea or vomiting, Disp: 10 tablet, Rfl: 0    Riboflavin (VITAMIN B-2) 25 MG TABS, Take by mouth, Disp: , Rfl:     rizatriptan (MAXALT-MLT) 10 MG disintegrating tablet, May repeat in 2 hours if needed, Disp: 9 tablet, Rfl: 0    zonisamide (ZONEGRAN) 100 mg capsule, TAKE 1 CAPSULE DAILY AT BEDTIME, Disp: 90 capsule, Rfl: 1    butalbital-acetaminophen-caffeine (FIORICET,ESGIC) -40 mg per tablet, Take 1 tablet by mouth every 4 (four) hours as needed for headaches, Disp: 30 tablet, Rfl: 1    Current Facility-Administered Medications:     prochlorperazine edisylate (COMPAZINE) injection 10 mg, 10 mg, Intramuscular, Q4H PRN, Mike Joyner PA-C, 10 mg at 01/26/18 1517    Review of Systems  Constitutional:     Denies fever, chills ,fatigue ,weakness ,weight loss, weight gain     ENT: Denies earache ,loss of hearing ,nosebleed, nasal discharge,nasal congestion ,sore throat ,hoarseness  Pulmonary: Denies shortness of breath ,cough  ,dyspnea on exertion, orthopnea  ,PND   Cardiovascular:  Denies bradycardia , tachycardia  ,palpations, lower extremity edema leg, claudication  Breast:  Denies new or changing breast lumps ,nipple discharge ,nipple changes  Abdomen:  Denies abdominal pain , anorexia , indigestion, nausea, vomiting, constipation, diarrhea  Musculoskeletal: Denies myalgias, arthralgias, joint swelling, joint stiffness , limb pain, limb swelling  Gu: denies dysuria, polyuria  Skin: Denies skin rash, skin lesion, skin wound, itching, dry skin  Neuro: Denies headache, numbness, tingling, confusion, loss of consciousness, dizziness, vertigo  Psychiatric: Denies feelings of depression, suicidal ideation, anxiety, sleep disturbances    OBJECTIVE  /92   Pulse 72   Temp 98 6 °F (37 °C)   Ht 5' 5" (1 651 m)   Wt 88 kg (194 lb)   LMP 11/18/2015   BMI 32 28 kg/m²   Constitutional:   NAD, well appearing and well nourished      ENT:   Conjunctiva and lids: no injection, edema, or discharge     Pupils and iris: CHELSEA bilaterally    External inspection of ears and nose: normal without deformities or discharge  Otoscopic exam: Canals patent without erythema  Nasal mucosa, septum and turbinates: Normal or edema or discharge         Oropharynx:  Moist mucosa, normal tongue and tonsils without lesions  No erythema        Pulmonary:Respiratory effort normal rate and rhythm, no increased work of breathing  Auscultation of lungs:  Clear bilaterally with no adventitious breath sounds       Cardiovascular: regular rate and rhythm, S1 and S2, no murmur, no edema and/or varicosities of LE      Abdomen: Soft and non-distended     Positive bowel sounds      No heptomegaly or splenomegaly      Gu: no suprapubic tenderness or CVA tenderness, no urethral discharge  Lymphatic:  No anterior or posterior cervical lymphadenopathy         Musculoskeletal:  Gait and station: Normal gait      Digits and nails normal without clubbing or cyanosis       Inspection/palpation of joints, bones, and muscles:  No joint tenderness, swelling, full active and passive range of motion       Skin: Normal skin turgor and no rashes      Neuro:    Normal reflexes      Psych:   alert and oriented to person, place and time     normal mood and affect       Assessment/Plan:Diagnoses and all orders for this visit:    Other migraine without status migrainosus, not intractable  -     butalbital-acetaminophen-caffeine (FIORICET,ESGIC) -40 mg per tablet; Take 1 tablet by mouth every 4 (four) hours as needed for headaches    Essential hypertension  Comments:   Will recheck her bp in 2 weeks and she is to bring her home monitor with her next time so readings can be calibrated  Orders:  -     lisinopril (ZESTRIL) 5 mg tablet; Take 2 tablets (10 mg total) by mouth daily        Will recheck bp in 2 weeks time

## 2019-08-05 NOTE — ED ATTENDING ATTESTATION
Marga Guaman MD, saw and evaluated the patient  I have discussed the patient with the resident/non-physician practitioner and agree with the resident's/non-physician practitioner's findings, Plan of Care, and MDM as documented in the resident's/non-physician practitioner's note, except where noted  All available labs and Radiology studies were reviewed  I was present for key portions of any procedure(s) performed by the resident/non-physician practitioner and I was immediately available to provide assistance  At this point I agree with the current assessment done in the Emergency Department  I have conducted an independent evaluation of this patient a history and physical is as follows:    51-year-old female presenting to emergency department with headache, 5 days, typical for migraines  Photophobia  Nausea without vomiting  No fevers  No chills  No neck pain  Neck is supple  No rashes  Pain not worsen beginning  Getting gradually worse  Tried to take home medications with minimal relief  Sees Neurology for migraines  Nonfocal neuro exam   Strength sensation intact throughout  Cranial nerves intact      Migraine cocktail, re-evaluate          Critical Care Time  Procedures

## 2019-08-06 ENCOUNTER — APPOINTMENT (OUTPATIENT)
Dept: LAB | Facility: CLINIC | Age: 49
End: 2019-08-06
Payer: COMMERCIAL

## 2019-08-06 DIAGNOSIS — E78.00 HYPERCHOLESTEROLEMIA: ICD-10-CM

## 2019-08-06 DIAGNOSIS — I10 ESSENTIAL HYPERTENSION: ICD-10-CM

## 2019-08-06 LAB
ALBUMIN SERPL BCP-MCNC: 3.7 G/DL (ref 3.5–5)
ALP SERPL-CCNC: 72 U/L (ref 46–116)
ALT SERPL W P-5'-P-CCNC: 46 U/L (ref 12–78)
ANION GAP SERPL CALCULATED.3IONS-SCNC: 13 MMOL/L (ref 4–13)
AST SERPL W P-5'-P-CCNC: 31 U/L (ref 5–45)
BILIRUB SERPL-MCNC: 0.5 MG/DL (ref 0.2–1)
BUN SERPL-MCNC: 12 MG/DL (ref 5–25)
CALCIUM SERPL-MCNC: 8.8 MG/DL (ref 8.3–10.1)
CHLORIDE SERPL-SCNC: 108 MMOL/L (ref 100–108)
CHOLEST SERPL-MCNC: 179 MG/DL (ref 50–200)
CO2 SERPL-SCNC: 21 MMOL/L (ref 21–32)
CREAT SERPL-MCNC: 1.01 MG/DL (ref 0.6–1.3)
GFR SERPL CREATININE-BSD FRML MDRD: 67 ML/MIN/1.73SQ M
GLUCOSE P FAST SERPL-MCNC: 89 MG/DL (ref 65–99)
HDLC SERPL-MCNC: 42 MG/DL (ref 40–60)
LDLC SERPL CALC-MCNC: 121 MG/DL (ref 0–100)
POTASSIUM SERPL-SCNC: 3.7 MMOL/L (ref 3.5–5.3)
PROT SERPL-MCNC: 6.7 G/DL (ref 6.4–8.2)
SODIUM SERPL-SCNC: 142 MMOL/L (ref 136–145)
TRIGL SERPL-MCNC: 82 MG/DL

## 2019-08-06 PROCEDURE — 36415 COLL VENOUS BLD VENIPUNCTURE: CPT

## 2019-08-06 PROCEDURE — 80053 COMPREHEN METABOLIC PANEL: CPT

## 2019-08-06 PROCEDURE — 80061 LIPID PANEL: CPT

## 2019-08-14 DIAGNOSIS — I10 ESSENTIAL HYPERTENSION: Primary | ICD-10-CM

## 2019-08-14 RX ORDER — LISINOPRIL 10 MG/1
10 TABLET ORAL DAILY
Qty: 90 TABLET | Refills: 3 | Status: SHIPPED | OUTPATIENT
Start: 2019-08-14 | End: 2020-04-16

## 2019-08-14 RX ORDER — LISINOPRIL 10 MG/1
10 TABLET ORAL DAILY
COMMUNITY
End: 2019-08-14 | Stop reason: SDUPTHER

## 2019-08-14 NOTE — TELEPHONE ENCOUNTER
Dose was increased to 10 mg daily, patient would like to take one daily instead of 2 fives, script sent on 8/5 was not enough, it was for 90 tabs taking 2 daily

## 2019-08-23 ENCOUNTER — OFFICE VISIT (OUTPATIENT)
Dept: FAMILY MEDICINE CLINIC | Facility: CLINIC | Age: 49
End: 2019-08-23
Payer: COMMERCIAL

## 2019-08-23 VITALS
BODY MASS INDEX: 31.74 KG/M2 | TEMPERATURE: 98.1 F | HEIGHT: 65 IN | DIASTOLIC BLOOD PRESSURE: 80 MMHG | SYSTOLIC BLOOD PRESSURE: 122 MMHG | WEIGHT: 190.5 LBS | HEART RATE: 72 BPM

## 2019-08-23 DIAGNOSIS — G43.809 OTHER MIGRAINE WITHOUT STATUS MIGRAINOSUS, NOT INTRACTABLE: Primary | ICD-10-CM

## 2019-08-23 DIAGNOSIS — I10 ESSENTIAL HYPERTENSION: ICD-10-CM

## 2019-08-23 PROCEDURE — 99213 OFFICE O/P EST LOW 20 MIN: CPT | Performed by: FAMILY MEDICINE

## 2019-08-23 PROCEDURE — 1036F TOBACCO NON-USER: CPT | Performed by: FAMILY MEDICINE

## 2019-08-23 PROCEDURE — 3074F SYST BP LT 130 MM HG: CPT | Performed by: FAMILY MEDICINE

## 2019-08-23 RX ORDER — PREDNISONE 10 MG/1
TABLET ORAL
Qty: 26 TABLET | Refills: 0 | Status: SHIPPED | OUTPATIENT
Start: 2019-08-23 | End: 2020-01-28

## 2019-08-23 NOTE — PROGRESS NOTES
Patient ID: Juan Edward is a 57 y o  female  HPI: 57 y  o female presents for recheck of hypertension and migraines  Her bp is much better controlled, however, her migraines persist   The maxalt takes headaches away for 24 hrs and then they come back  She isn't due for botox injections until Oct for migraines        SUBJECTIVE    Family History   Problem Relation Age of Onset   Maria Teresa Roles Hypertension Mother    Maria Teresa Roles Other Mother         Bladder surgery    Diabetes Father     Hypertension Father     Leukemia Sister     No Known Problems Maternal Aunt     No Known Problems Maternal Aunt     Cancer Maternal Grandmother         Bladder    Dementia Maternal Grandfather     Leukemia Paternal Grandmother      Social History     Socioeconomic History    Marital status: /Civil Union     Spouse name: Not on file    Number of children: Not on file    Years of education: Not on file    Highest education level: Not on file   Occupational History    Not on file   Social Needs    Financial resource strain: Not on file    Food insecurity:     Worry: Not on file     Inability: Not on file    Transportation needs:     Medical: Not on file     Non-medical: Not on file   Tobacco Use    Smoking status: Never Smoker    Smokeless tobacco: Never Used   Substance and Sexual Activity    Alcohol use: No     Comment: per [employer]-drinks socially    Drug use: No    Sexual activity: Not on file   Lifestyle    Physical activity:     Days per week: Not on file     Minutes per session: Not on file    Stress: Not on file   Relationships    Social connections:     Talks on phone: Not on file     Gets together: Not on file     Attends Mu-ism service: Not on file     Active member of club or organization: Not on file     Attends meetings of clubs or organizations: Not on file     Relationship status: Not on file    Intimate partner violence:     Fear of current or ex partner: Not on file     Emotionally abused: Not on file     Physically abused: Not on file     Forced sexual activity: Not on file   Other Topics Concern    Not on file   Social History Narrative    Denied coffee, cola, tea consumption per [employer]     Past Medical History:   Diagnosis Date    Cervicalgia     disc displacement,radiculopathy    Depression     Disease of thyroid gland     Hypertension     last assessed: 8/2/2016    Kidney stone     Migraine     Myofascial pain syndrome      Past Surgical History:   Procedure Laterality Date    BREAST BIOPSY Right     biospy w/ marker    CARPAL TUNNEL RELEASE Right     GANGLION CYST EXCISION Left 8/3/2017    Procedure: WRIST/HAND MASS EXCISION;  Surgeon: Chyeenne Hall MD;  Location:  MAIN OR;  Service: Orthopedics    HYSTERECTOMY       Allergies   Allergen Reactions    Levofloxacin Anaphylaxis and Swelling     Patient reports face swelled, throat was starting to close  Current Outpatient Medications:     ALPRAZolam (XANAX) 0 25 mg tablet, Take 1 tablet (0 25 mg total) by mouth 3 (three) times a day as needed for anxiety for up to 30 days, Disp: 60 tablet, Rfl: 0    amitriptyline (ELAVIL) 25 mg tablet, Take 1 tablet (25 mg total) by mouth daily at bedtime, Disp: 90 tablet, Rfl: 2    aspirin 81 MG tablet, Take 81 mg by mouth daily  , Disp: , Rfl:     Botulinum Toxin Type A 200 units SOLR, INJECT UP  UNITS ONCE EVERY 3 MONTHS AS DIRECTED   IM INTO HEAD AND NECK AREA , Disp: 200 Units, Rfl: 3    butalbital-acetaminophen-caffeine (FIORICET,ESGIC) -40 mg per tablet, Take 1 tablet by mouth every 4 (four) hours as needed for headaches, Disp: 30 tablet, Rfl: 1    Cholecalciferol (VITAMIN D3) 1000 units CAPS, Take by mouth, Disp: , Rfl:     coenzyme Q-10 100 MG capsule, Take 100 mg by mouth as needed, Disp: , Rfl:     dexamethasone (DECADRON) 2 mg tablet, Take 1 tablet (2 mg total) by mouth daily as needed (migraine), Disp: 5 tablet, Rfl: 0    Glucosamine-Chondroit-Vit C-Mn (GLUCOSAMINE 1500 COMPLEX PO), Take 2 tablets by mouth daily, Disp: , Rfl:     levothyroxine 25 mcg tablet, TAKE 1 TABLET DAILY, Disp: 90 tablet, Rfl: 1    lisinopril (ZESTRIL) 10 mg tablet, Take 1 tablet (10 mg total) by mouth daily, Disp: 90 tablet, Rfl: 3    magnesium gluconate (MAGONATE) 500 mg tablet, Take 500 mg by mouth 2 (two) times a day , Disp: , Rfl:     POTASSIUM CITRATE ER PO, Take 99 mg by mouth daily, Disp: , Rfl:     prochlorperazine (COMPAZINE) 10 mg tablet, Take 1 tablet (10 mg total) by mouth every 6 (six) hours as needed for nausea or vomiting, Disp: 10 tablet, Rfl: 0    Riboflavin (VITAMIN B-2) 25 MG TABS, Take by mouth, Disp: , Rfl:     rizatriptan (MAXALT-MLT) 10 MG disintegrating tablet, May repeat in 2 hours if needed, Disp: 9 tablet, Rfl: 0    zonisamide (ZONEGRAN) 100 mg capsule, TAKE 1 CAPSULE DAILY AT BEDTIME, Disp: 90 capsule, Rfl: 1    predniSONE 10 mg tablet, 3 tabs po bid x2 days, then 2 tabs po bid x2 days, then 1 tab bid x2 days, then 1 daily until done , Disp: 26 tablet, Rfl: 0    Current Facility-Administered Medications:     prochlorperazine edisylate (COMPAZINE) injection 10 mg, 10 mg, Intramuscular, Q4H PRN, Se Candelario PA-C, 10 mg at 01/26/18 1517    Review of Systems  Constitutional:     Denies fever, chills ,fatigue ,weakness ,weight loss, weight gain     ENT: Denies earache ,loss of hearing ,nosebleed, nasal discharge,nasal congestion ,sore throat ,hoarseness  Pulmonary: Denies shortness of breath ,cough  ,dyspnea on exertion, orthopnea  ,PND   Cardiovascular:  Denies bradycardia , tachycardia  ,palpations, lower extremity edema leg, claudication  Breast:  Denies new or changing breast lumps ,nipple discharge ,nipple changes  Abdomen:  Denies abdominal pain , anorexia , indigestion, nausea, vomiting, constipation, diarrhea  Musculoskeletal: Denies myalgias, arthralgias, joint swelling, joint stiffness , limb pain, limb swelling  Gu: denies dysuria, polyuria  Skin: Denies skin rash, skin lesion, skin wound, itching, dry skin  Neuro: Denies numbness, tingling, confusion, loss of consciousness, dizziness, vertigo + headaches  Psychiatric: Denies feelings of depression, suicidal ideation, anxiety, sleep disturbances    OBJECTIVE  /80   Pulse 72   Temp 98 1 °F (36 7 °C)   Ht 5' 5" (1 651 m)   Wt 86 4 kg (190 lb 8 oz)   LMP 11/18/2015   BMI 31 70 kg/m²   Constitutional:   NAD, well appearing and well nourished      ENT:   Conjunctiva and lids: no injection, edema, or discharge     Pupils and iris: CHELSEA bilaterally    External inspection of ears and nose: normal without deformities or discharge  Otoscopic exam: Canals patent without erythema  Nasal mucosa, septum and turbinates: Normal or edema or discharge       Oropharynx:  Moist mucosa, normal tongue and tonsils without lesions  No erythema        Pulmonary:Respiratory effort normal rate and rhythm, no increased work of breathing   Auscultation of lungs:  Clear bilaterally with no adventitious breath sounds       Cardiovascular: regular rate and rhythm, S1 and S2, no murmur, no edema and/or varicosities of LE      Abdomen: Soft and non-distended     Positive bowel sounds      No heptomegaly or splenomegaly      Gu: no suprapubic tenderness or CVA tenderness, no urethral discharge  Lymphatic:  No anterior or posterior cervical lymphadenopathy         Musculoskeletal:  Gait and station: Normal gait      Digits and nails normal without clubbing or cyanosis       Inspection/palpation of joints, bones, and muscles:  No joint tenderness, swelling, full active and passive range of motion     Skin: Normal skin turgor and no rashes      Neuro:     Normal reflexes     Psych:   alert and oriented to person, place and time     normal mood and affect       Assessment/Plan:Diagnoses and all orders for this visit:    Other migraine without status migrainosus, not intractable  -     predniSONE 10 mg tablet; 3 tabs po bid x2 days, then 2 tabs po bid x2 days, then 1 tab bid x2 days, then 1 daily until done  Essential hypertension  Comments:  Continue current dose of ACE inhibitor  Reviewed with patient plan to treat with above plan      Patient instructed to call in 72 hours if not feeling better or if symptoms worsen

## 2019-08-30 ENCOUNTER — TELEPHONE (OUTPATIENT)
Dept: FAMILY MEDICINE CLINIC | Facility: CLINIC | Age: 49
End: 2019-08-30

## 2019-08-30 NOTE — TELEPHONE ENCOUNTER
Patient called stating she was seen on 8/23/19 and was told to call back if her headaches didn't get better with the prednisone to call again  Patient states the prednisone helped a tiny bit but not a lot  She still has the headache and doesn't know what to do from here       Jose garvin

## 2019-09-10 ENCOUNTER — TELEPHONE (OUTPATIENT)
Dept: NEUROLOGY | Facility: CLINIC | Age: 49
End: 2019-09-10

## 2019-09-10 NOTE — TELEPHONE ENCOUNTER
Called [facility]- spoke with Lucy Rankin- I made her aware I was calling to initiate a refill on the patient's Botox prescription  Refill request initiated  Will need to call in a new verbal Rx- current script on file is   Spoke with Sonia pharmacist- provided her with a verbal Rx for Botox 200 units QTY: 1 under [facility] with 3 refills for chronic migraine  Will do a status check in 2 days

## 2019-09-10 NOTE — TELEPHONE ENCOUNTER
Type Date User Summary Attachment   General 2019 10:48 AM Dougie Levine Care Coordination  -   Note    Botox- no authorization needed   Please use [facility]       Thank you,     Ramonia Apgar

## 2019-09-11 NOTE — TELEPHONE ENCOUNTER
[address] spoke with Katelyn Sanots- she states the patient's Botox order is currently being processed through the pharmacy  She states patient's consent is on file  She will send an e-mail to the processing team to expedite the process so this can go to insurance  Will do a status check in 2 days

## 2019-09-13 NOTE — TELEPHONE ENCOUNTER
Previous PA through [facility]  as of 2019  New auth submitted CMM key :[id number]  All information and questions answered  I will await determination

## 2019-09-16 NOTE — TELEPHONE ENCOUNTER
Carrington Smith   Bailey: [id number]  Need help? Call us at [phone]   Outcome  Approvedon September 13  Effective from 09/13/2019 through 09/12/2020   Approved/ Botox/ give intramuscularly per migraine protocol every 3 months  DrugBotox 200UNIT solution  [employer] [facility] Review Form

## 2019-09-16 NOTE — TELEPHONE ENCOUNTER
Spoke with rep Kenneth Dykes from [facility] to check on status of Botox  She stated order is still in insurance for verification  I did state I can provide updated P A information but was told I need to call the insurance plan to provide information which is incorrect

## 2019-09-16 NOTE — TELEPHONE ENCOUNTER
Called [facility] and spoke with Lamar Ramsey (PA ) provided [place] information  I will await call for scheduling

## 2019-09-17 NOTE — TELEPHONE ENCOUNTER
Spoke with rep Radha Monson to check status  She stated order is ready to schedule  Scheduled delivery of Botox 200 unit SDV quantity of 1 to arrive to [facility] office on Thursday 9/19/19 priority overnight with signature required  I will await shipment

## 2019-09-19 NOTE — TELEPHONE ENCOUNTER
Botox 200 unit SDV quantity of 1 (LOT # [id number]) arrived today  Placed in [address]  and logged

## 2019-09-28 DIAGNOSIS — F41.9 ANXIETY: ICD-10-CM

## 2019-09-30 RX ORDER — ALPRAZOLAM 0.25 MG/1
TABLET ORAL
Qty: 60 TABLET | Refills: 3 | Status: SHIPPED | OUTPATIENT
Start: 2019-09-30 | End: 2020-04-06

## 2019-10-01 ENCOUNTER — PROCEDURE VISIT (OUTPATIENT)
Dept: NEUROLOGY | Facility: CLINIC | Age: 49
End: 2019-10-01
Payer: COMMERCIAL

## 2019-10-01 VITALS — SYSTOLIC BLOOD PRESSURE: 110 MMHG | DIASTOLIC BLOOD PRESSURE: 88 MMHG | HEART RATE: 67 BPM | TEMPERATURE: 98.9 F

## 2019-10-01 DIAGNOSIS — G43.709 CHRONIC MIGRAINE WITHOUT AURA WITHOUT STATUS MIGRAINOSUS, NOT INTRACTABLE: Primary | ICD-10-CM

## 2019-10-01 PROCEDURE — 64615 CHEMODENERV MUSC MIGRAINE: CPT | Performed by: PHYSICIAN ASSISTANT

## 2019-10-01 NOTE — PROGRESS NOTES
Chemodenervation  Date/Time: 10/1/2019 3:33 PM  Performed by: Mary Nino PA-C  Authorized by: Mary Nino PA-C     Pre-procedure details:     Prepped With: Alcohol    Anesthesia (see MAR for exact dosages): Anesthesia method:  None  Procedure details:     Position:  Upright  Botox:     Botox Type:  Type A    Brand:  Botox    mL's of Botulinum Toxin:  200    Final Concentration per CC:  200 units    Needle Gauge:  30 G 2 5 inch  Procedures:     Botox Procedures: chronic headache      Indications: migraines    Injection Location:     Head / Face:  L superior cervical paraspinal, R superior cervical paraspinal, L , R , L frontalis, R frontalis, L medial occipitalis, R medial occipitalis, procerus, R temporalis, L temporalis, R superior trapezius and L superior trapezius    L  injection amount:  5 unit(s)    R  injection amount:  5 unit(s)    L lateral frontalis:  5 unit(s)    R lateral frontalis:  5 unit(s)    L medial frontalis:  5 unit(s)    R medial frontalis:  5 unit(s)    L temporalis injection amount:  20 unit(s)    R temporalis injection amount:  20 unit(s)    Procerus injection amount:  5 unit(s)    L medial occipitalis injection amount:  15 unit(s)    R medial occipitalis injection amount:  15 unit(s)    L superior cervical paraspinal injection amount:  10 unit(s)    R superior cervical paraspinal injection amount:  10 unit(s)    L superior trapezius injection amount:  15 unit(s)    R superior trapezius injection amount:  15 unit(s)  Total Units:     Total units used:  200    Total units discarded:  0  Post-procedure details:     Chemodenervation:  Chronic migraine    Facial Nerve Location[de-identified]  Bilateral facial nerve    Patient tolerance of procedure:   Tolerated well, no immediate complications  Comments:      45 units frontalis  All medically necessary

## 2019-10-17 ENCOUNTER — OFFICE VISIT (OUTPATIENT)
Dept: NEUROLOGY | Facility: CLINIC | Age: 49
End: 2019-10-17
Payer: COMMERCIAL

## 2019-10-17 VITALS
WEIGHT: 190 LBS | DIASTOLIC BLOOD PRESSURE: 90 MMHG | HEIGHT: 60 IN | HEART RATE: 83 BPM | SYSTOLIC BLOOD PRESSURE: 133 MMHG | BODY MASS INDEX: 37.3 KG/M2

## 2019-10-17 DIAGNOSIS — F41.9 ANXIETY: ICD-10-CM

## 2019-10-17 DIAGNOSIS — G43.709 CHRONIC MIGRAINE WITHOUT AURA WITHOUT STATUS MIGRAINOSUS, NOT INTRACTABLE: Primary | ICD-10-CM

## 2019-10-17 PROCEDURE — 99213 OFFICE O/P EST LOW 20 MIN: CPT | Performed by: PHYSICIAN ASSISTANT

## 2019-10-17 RX ORDER — AMITRIPTYLINE HYDROCHLORIDE 50 MG/1
50 TABLET, FILM COATED ORAL
Start: 2019-10-17 | End: 2019-12-16 | Stop reason: SDUPTHER

## 2019-10-17 NOTE — ASSESSMENT & PLAN NOTE
Pt reports overall stability of migraine headaches despite stopping olanzapine  Since starting Botox pt has had a reduction in migraine headaches by 7 days as correlated by a headache diary  Pt has had decreased trips to the ER and decreased use of abortive medications  She will continue with Botox injections, magnesium, zonisamide, and amitriptyline  She will continue rizatriptan and Fioricet for treatment of acute migraines  Fifteen minutes were spent with the patient gathering history, examining patient and formulating a treatment plan  Pt/family understood and were in agreement with the treatment plan  She will follow-up in 6 months

## 2019-10-17 NOTE — PATIENT INSTRUCTIONS
Chronic migraine without aura without status migrainosus, not intractable  Pt reports overall stability of migraine headaches despite stopping olanzapine  Since starting Botox pt has had a reduction in migraine headaches by 7 days as correlated by a headache diary  Pt has had decreased trips to the ER and decreased use of abortive medications  She will continue with Botox injections, magnesium, zonisamide, and amitriptyline  She will continue rizatriptan and Fioricet for treatment of acute migraines  Fifteen minutes were spent with the patient gathering history, examining patient and formulating a treatment plan  Pt/family understood and were in agreement with the treatment plan  She will follow-up in 6 months
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
## 2019-10-18 ENCOUNTER — APPOINTMENT (EMERGENCY)
Dept: RADIOLOGY | Facility: HOSPITAL | Age: 49
End: 2019-10-18
Payer: OTHER MISCELLANEOUS

## 2019-10-18 ENCOUNTER — HOSPITAL ENCOUNTER (EMERGENCY)
Facility: HOSPITAL | Age: 49
Discharge: HOME | End: 2019-10-18
Attending: EMERGENCY MEDICINE | Admitting: EMERGENCY MEDICINE
Payer: OTHER MISCELLANEOUS

## 2019-10-18 VITALS
TEMPERATURE: 98.2 F | BODY MASS INDEX: 37.11 KG/M2 | SYSTOLIC BLOOD PRESSURE: 133 MMHG | WEIGHT: 190 LBS | OXYGEN SATURATION: 98 % | DIASTOLIC BLOOD PRESSURE: 93 MMHG | RESPIRATION RATE: 18 BRPM | HEART RATE: 91 BPM
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
## 2019-10-18 DIAGNOSIS — S60.512A ABRASION OF PALM OF HAND, LEFT, INITIAL ENCOUNTER: ICD-10-CM

## 2019-10-18 DIAGNOSIS — S60.511A ABRASION OF PALM OF RIGHT HAND, INITIAL ENCOUNTER: Primary | ICD-10-CM

## 2019-10-18 DIAGNOSIS — S60.416A ABRASION OF RIGHT LITTLE FINGER, INITIAL ENCOUNTER: ICD-10-CM

## 2019-10-18 PROCEDURE — 99283 EMERGENCY DEPT VISIT LOW MDM: CPT | Performed by: PHYSICIAN ASSISTANT

## 2019-10-18 PROCEDURE — 73130 X-RAY EXAM OF HAND: CPT

## 2019-10-18 PROCEDURE — 90715 TDAP VACCINE 7 YRS/> IM: CPT | Performed by: PHYSICIAN ASSISTANT

## 2019-10-18 PROCEDURE — 99283 EMERGENCY DEPT VISIT LOW MDM: CPT

## 2019-10-18 PROCEDURE — 90471 IMMUNIZATION ADMIN: CPT

## 2019-10-18 RX ORDER — GINSENG 100 MG
1 CAPSULE ORAL ONCE
Status: COMPLETED | OUTPATIENT
Start: 2019-10-18 | End: 2019-10-18

## 2019-10-18 RX ADMIN — BACITRACIN 1 SMALL APPLICATION: 500 OINTMENT TOPICAL at 11:44

## 2019-10-18 RX ADMIN — TETANUS TOXOID, REDUCED DIPHTHERIA TOXOID AND ACELLULAR PERTUSSIS VACCINE, ADSORBED 0.5 ML: 5; 2.5; 8; 8; 2.5 SUSPENSION INTRAMUSCULAR at 11:44

## 2019-10-18 NOTE — ED PROVIDER NOTES
History  Chief Complaint   Patient presents with    Wrist Injury     pt was walking into work when she tripped over a crack in the side walk and landed on  both of her palms, she c/o pain of a lac on right palm along with pain in the  ulnar side of palm  46-year-old female past medical history of hypertension, hypothyroid, migraines presents emergency department after sustaining a fall at work  Patient reportedly tripped over a crack in the sidewalk, and fell on to both outstretched hands  She denies hitting her head at the time of the fall  She sustained abrasions to her bilateral palms, right greater than left  She noted the right palm to be bleeding, and washed it with peroxide and water  She denies any numbness, tingling, weakness in the bilateral hands  She reports she is right-hand dominant and had carpal tunnel repair on the right hand several years ago  Patient states she took Tylenol at 8:30 a m  for the pain  Patient denies any fever, chest pain, shortness of breath, abdominal pain, nausea, vomiting, diarrhea, headache, dizziness  Her tetanus status is unknown  Hand Pain   Location:  Bilateral palms  Severity:  Mild  Duration:  3 hours  Chronicity:  New  Associated symptoms: no abdominal pain, no chest pain, no congestion, no cough, no diarrhea, no fever, no headaches, no loss of consciousness, no nausea, no rash, no shortness of breath, no sore throat and no vomiting        Prior to Admission Medications   Prescriptions Last Dose Informant Patient Reported? Taking? ALPRAZolam (XANAX) 0 25 mg tablet   No No   Sig: TAKE 1 TABLET(0 25 MG) BY MOUTH THREE TIMES DAILY AS NEEDED FOR ANXIETY   Botulinum Toxin Type A 200 units SOLR  Self No No   Sig: INJECT UP  UNITS ONCE EVERY 3 MONTHS AS DIRECTED  IM INTO HEAD AND NECK AREA     Cholecalciferol (VITAMIN D3) 1000 units CAPS  Self Yes No   Sig: Take by mouth   Glucosamine-Chondroit-Vit C-Mn (GLUCOSAMINE 1500 COMPLEX PO)  Self Yes No Sig: Take 2 tablets by mouth daily   POTASSIUM CITRATE ER PO  Self Yes No   Sig: Take 99 mg by mouth daily   Riboflavin (VITAMIN B-2) 25 MG TABS  Self Yes No   Sig: Take by mouth   amitriptyline (ELAVIL) 50 mg tablet   No No   Sig: Take 1 tablet (50 mg total) by mouth daily at bedtime   aspirin 81 MG tablet  Self Yes No   Sig: Take 81 mg by mouth daily  butalbital-acetaminophen-caffeine (FIORICET,ESGIC) -40 mg per tablet  Self No No   Sig: Take 1 tablet by mouth every 4 (four) hours as needed for headaches   coenzyme Q-10 100 MG capsule  Self Yes No   Sig: Take 100 mg by mouth as needed   dexamethasone (DECADRON) 2 mg tablet  Self No No   Sig: Take 1 tablet (2 mg total) by mouth daily as needed (migraine)   levothyroxine 25 mcg tablet  Self No No   Sig: TAKE 1 TABLET DAILY   lisinopril (ZESTRIL) 10 mg tablet  Self No No   Sig: Take 1 tablet (10 mg total) by mouth daily   magnesium gluconate (MAGONATE) 500 mg tablet  Self Yes No   Sig: Take 500 mg by mouth 2 (two) times a day  predniSONE 10 mg tablet   No No   Sig: 3 tabs po bid x2 days, then 2 tabs po bid x2 days, then 1 tab bid x2 days, then 1 daily until done     Patient not taking: Reported on 10/17/2019   prochlorperazine (COMPAZINE) 10 mg tablet  Self No No   Sig: Take 1 tablet (10 mg total) by mouth every 6 (six) hours as needed for nausea or vomiting   rizatriptan (MAXALT-MLT) 10 MG disintegrating tablet  Self No No   Sig: May repeat in 2 hours if needed   zonisamide (ZONEGRAN) 100 mg capsule  Self No No   Sig: TAKE 1 CAPSULE DAILY AT BEDTIME      Facility-Administered Medications Last Administration Doses Remaining   prochlorperazine edisylate (COMPAZINE) injection 10 mg 1/26/2018  3:17 PM           Past Medical History:   Diagnosis Date    Cervicalgia     disc displacement,radiculopathy    Depression     Disease of thyroid gland     Hypertension     last assessed: 8/2/2016    Migraine     Myofascial pain syndrome        Past Surgical History:   Procedure Laterality Date    BREAST BIOPSY Right     biospy w/ marker    CARPAL TUNNEL RELEASE Right     GANGLION CYST EXCISION Left 8/3/2017    Procedure: WRIST/HAND MASS EXCISION;  Surgeon: Tadeo Cohen MD;  Location:  MAIN OR;  Service: Orthopedics    HYSTERECTOMY         Family History   Problem Relation Age of Onset    Hypertension Mother    [facility] Other Mother         Bladder surgery    Diabetes Father     Hypertension Father     Leukemia Sister     No Known Problems Maternal Aunt     No Known Problems Maternal Aunt     Cancer Maternal Grandmother         Bladder    Dementia Maternal Grandfather     Leukemia Paternal Grandmother      I have reviewed and agree with the history as documented  Social History     Tobacco Use    Smoking status: Never Smoker    Smokeless tobacco: Never Used   Substance Use Topics    Alcohol use: No     Comment: per [employer]scripts-drinks socially    Drug use: No        Review of Systems   Constitutional: Negative for chills and fever  HENT: Negative for congestion and sore throat  Respiratory: Negative for cough and shortness of breath  Cardiovascular: Negative for chest pain  Gastrointestinal: Negative for abdominal pain, diarrhea, nausea and vomiting  Genitourinary: Negative for dysuria, frequency and urgency  Musculoskeletal: Positive for arthralgias  Skin: Positive for wound  Negative for rash  Neurological: Negative for loss of consciousness, weakness and headaches  All other systems reviewed and are negative  Physical Exam  Physical Exam   Constitutional: She is oriented to person, place, and time  Vital signs are normal  She appears well-developed and well-nourished  No distress  HENT:   Head: Normocephalic and atraumatic     Right Ear: Hearing and external ear normal    Left Ear: Hearing and external ear normal    Nose: Nose normal    Mouth/Throat: Mucous membranes are normal    Eyes: Conjunctivae are normal    Neck: Normal range of motion and full passive range of motion without pain  Cardiovascular: Normal rate, regular rhythm, S1 normal, S2 normal and normal heart sounds  Pulses:       Radial pulses are 2+ on the right side, and 2+ on the left side  Pulmonary/Chest: Effort normal and breath sounds normal  No accessory muscle usage  No respiratory distress  She has no wheezes  Abdominal: Soft  There is no tenderness  There is no rebound and no guarding  Musculoskeletal:        Hands:  Normal range of motion; no edema, tenderness, or deformities  2+ radial pulses bilaterally  No snuffbox tenderness bilaterally  Full range of motion to both hands included finger abduction, adduction, thumb opposition, wrist flexion, wrist extension  Sensation is grossly intact to the bilateral upper hands radial and ulnar sides  Tenderness and minor swelling to the 5th metacarpal, with tenderness to the 4th metacarpal    Neurological: She is alert and oriented to person, place, and time  Skin: Skin is warm and dry  Capillary refill takes 2 to 3 seconds  Abrasion and laceration noted  No bruising and no ecchymosis noted  Nursing note and vitals reviewed        Vital Signs  ED Triage Vitals [10/18/19 1110]   Temperature Pulse Respirations Blood Pressure SpO2   98 2 °F (36 8 °C) 91 18 133/93 98 %      Temp Source Heart Rate Source Patient Position - Orthostatic VS BP Location FiO2 (%)   Oral Monitor Lying Right arm --      Pain Score       8           Vitals:    10/18/19 1110   BP: 133/93   Pulse: 91   Patient Position - Orthostatic VS: Lying         Visual Acuity      ED Medications  Medications   bacitracin topical ointment 1 small application (1 small application Topical Given 10/18/19 1144)   tetanus-diphtheria-acellular pertussis (BOOSTRIX) IM injection 0 5 mL (0 5 mL Intramuscular Given 10/18/19 1144)       Diagnostic Studies  Results Reviewed     None                 XR hand 3+ views RIGHT   ED Interpretation by Nori Goncalves Anthony Porter PA-C (10/18 6088)   Preliminary reading by myself:  No acute osseous abnormality  Procedures  Procedures       ED Course                               MDM  Number of Diagnoses or Management Options  Abrasion of palm of hand, left, initial encounter:   Abrasion of palm of right hand, initial encounter:   Abrasion of right little finger, initial encounter:   Fall, initial encounter:   Diagnosis management comments: 53-year-old female status post fall on outstretched hands bilaterally  Abrasion to right palm at the little digit with a superficial laceration at the MCP joint crease  Explained the patient this abrasion and superficial laceration is not amenable to sutures  Cleaned the wound, applied bacitracin and dressing  X-ray of right hand obtained  Tetanus was updated  The xray was reviewed by me  I do not see evidence of a fracture, however the film will be reviewed by a radiologist   I informed the patient of this and if there is any discrepancy, the patient will be contacted  Instructed patient to clean the wound daily and apply bacitracin  Instructed patient to keep it covered while at work as she is exposed to water, chemicals as a   Instructed patient to follow up with her occupational health representative in 3 4 days  Patient was apprised of red flag symptoms and return to emergency department precautions for any new or worsening symptoms  Patient verbalized understanding and all questions were answered           Amount and/or Complexity of Data Reviewed  Tests in the radiology section of CPT®: reviewed and ordered        Disposition  Final diagnoses:   Abrasion of palm of right hand, initial encounter   Abrasion of palm of hand, left, initial encounter   Abrasion of right little finger, initial encounter   Fall, initial encounter     Time reflects when diagnosis was documented in both MDM as applicable and the Disposition within this note Time User Action Codes Description Comment    10/18/2019 11:35 AM Jeannene Knife Add [M17 600F] Abrasion of palm of right hand, initial encounter     10/18/2019 11:35 AM Jeannene Knife Add [S60 512A] Abrasion of palm of hand, left, initial encounter     10/18/2019 11:42 AM Jeannene Knife Add [S60 416A] Abrasion of right little finger, initial encounter     10/18/2019 11:42 AM Jeannene Knife Add [W19  Ziilan Sames, initial encounter       ED Disposition     ED Disposition Condition Date/Time Comment    Discharge Stable Fri Oct 18, 2019 11:35 AM Monse Bernstein discharge to home/self care  Follow-up Information     Follow up With Specialties Details Why Contact Info    Your occupational health for presented  Please call today for an evaluation in 3-4 days  Arlene Chahal,  Family Medicine   [address]  [phone]            Discharge Medication List as of 10/18/2019 11:49 AM      CONTINUE these medications which have NOT CHANGED    Details   ALPRAZolam (XANAX) 0 25 mg tablet TAKE 1 TABLET(0 25 MG) BY MOUTH THREE TIMES DAILY AS NEEDED FOR ANXIETY, Normal      amitriptyline (ELAVIL) 50 mg tablet Take 1 tablet (50 mg total) by mouth daily at bedtime, Starting Thu 10/17/2019, No Print      aspirin 81 MG tablet Take 81 mg by mouth daily  , Historical Med      Botulinum Toxin Type A 200 units SOLR INJECT UP  UNITS ONCE EVERY 3 MONTHS AS DIRECTED  IM INTO HEAD AND NECK AREA  , Print      butalbital-acetaminophen-caffeine (FIORICET,ESGIC) -40 mg per tablet Take 1 tablet by mouth every 4 (four) hours as needed for headaches, Starting Mon 8/5/2019, Normal      Cholecalciferol (VITAMIN D3) 1000 units CAPS Take by mouth, Historical Med      coenzyme Q-10 100 MG capsule Take 100 mg by mouth as needed, Historical Med      dexamethasone (DECADRON) 2 mg tablet Take 1 tablet (2 mg total) by mouth daily as needed (migraine), Starting Mon 8/20/2018, Normal      Glucosamine-Chondroit-Vit C-Mn (GLUCOSAMINE 1500 COMPLEX PO) Take 2 tablets by mouth daily, Starting Tue 12/26/2017, Historical Med      levothyroxine 25 mcg tablet TAKE 1 TABLET DAILY, Normal      lisinopril (ZESTRIL) 10 mg tablet Take 1 tablet (10 mg total) by mouth daily, Starting Wed 8/14/2019, Normal      magnesium gluconate (MAGONATE) 500 mg tablet Take 500 mg by mouth 2 (two) times a day , Historical Med      POTASSIUM CITRATE ER PO Take 99 mg by mouth daily, Historical Med      predniSONE 10 mg tablet 3 tabs po bid x2 days, then 2 tabs po bid x2 days, then 1 tab bid x2 days, then 1 daily until done , Normal      prochlorperazine (COMPAZINE) 10 mg tablet Take 1 tablet (10 mg total) by mouth every 6 (six) hours as needed for nausea or vomiting, Starting Wed 6/5/2019, Normal      Riboflavin (VITAMIN B-2) 25 MG TABS Take by mouth, Historical Med      rizatriptan (MAXALT-MLT) 10 MG disintegrating tablet May repeat in 2 hours if needed, Normal      zonisamide (ZONEGRAN) 100 mg capsule TAKE 1 CAPSULE DAILY AT BEDTIME, Normal           No discharge procedures on file      ED Provider  Electronically Signed by           Yaron Avendano PA-C  10/18/19 7401

## 2019-10-18 NOTE — DISCHARGE INSTRUCTIONS
The management plan was discussed in detail with the patient at bedside and all questions were answered  The prior to discharge, we provided both verbal and written instructions  We discussed with the patient the signs and symptoms for which to return to the emergency department  All questions were answered and patient was comfortable with the plan of care and discharged to home  Instructed the patient to follow up with the primary care provider and/or special as provided and their written instructions  The patient verbalized understanding of our discussion and plan of care, and agrees to return to the Emergency Department for concerns and progression of illness  The xray was reviewed by me  I do not see evidence of a fracture, however the film will be reviewed by a radiologist   I informed the patient of this and if there is any discrepancy, the patient will be contacted

## 2019-10-25 ENCOUNTER — TRANSCRIBE ORDERS (OUTPATIENT)
Dept: ADMINISTRATIVE | Facility: HOSPITAL | Age: 49
End: 2019-10-25

## 2019-10-25 DIAGNOSIS — R92.1 BREAST CALCIFICATION, LEFT: Primary | ICD-10-CM

## 2019-10-25 DIAGNOSIS — R92.1 BREAST CALCIFICATION, RIGHT: ICD-10-CM

## 2019-10-29 ENCOUNTER — TELEPHONE (OUTPATIENT)
Dept: NEUROLOGY | Facility: CLINIC | Age: 49
End: 2019-10-29

## 2019-10-29 NOTE — TELEPHONE ENCOUNTER
Patient called back  Offered patient earlier appt same day but patient declined due to work  Per Franci Hunt ok overbook and  offer 1/6/20 at 345 pm to patient  Patient took appt  Jaimie Punetes,    Can you please reschedule the Botox appt from 1/7/20   Ok per Franci Hunt to [facility] on 1/6/20 at 345 pm     Thank you,    Juanita Helm

## 2019-10-29 NOTE — TELEPHONE ENCOUNTER
Voicemail left for patient regarding needing to change Botox appt on 1/7/19  Asker for call back ASAP

## 2019-11-01 ENCOUNTER — OFFICE VISIT (OUTPATIENT)
Dept: OBGYN CLINIC | Facility: HOSPITAL | Age: 49
End: 2019-11-01
Payer: OTHER MISCELLANEOUS

## 2019-11-01 VITALS
BODY MASS INDEX: 38.28 KG/M2 | WEIGHT: 195 LBS | HEIGHT: 60 IN | DIASTOLIC BLOOD PRESSURE: 87 MMHG | HEART RATE: 89 BPM | SYSTOLIC BLOOD PRESSURE: 130 MMHG

## 2019-11-01 DIAGNOSIS — M25.531 PAIN IN RIGHT WRIST: Primary | ICD-10-CM

## 2019-11-01 PROCEDURE — 99213 OFFICE O/P EST LOW 20 MIN: CPT | Performed by: ORTHOPAEDIC SURGERY

## 2019-11-01 PROCEDURE — 20550 NJX 1 TENDON SHEATH/LIGAMENT: CPT | Performed by: ORTHOPAEDIC SURGERY

## 2019-11-01 RX ORDER — BETAMETHASONE SODIUM PHOSPHATE AND BETAMETHASONE ACETATE 3; 3 MG/ML; MG/ML
6 INJECTION, SUSPENSION INTRA-ARTICULAR; INTRALESIONAL; INTRAMUSCULAR; SOFT TISSUE
Status: COMPLETED | OUTPATIENT
Start: 2019-11-01 | End: 2019-11-01

## 2019-11-01 RX ORDER — LIDOCAINE HYDROCHLORIDE 10 MG/ML
1 INJECTION, SOLUTION INFILTRATION; PERINEURAL
Status: COMPLETED | OUTPATIENT
Start: 2019-11-01 | End: 2019-11-01

## 2019-11-01 RX ADMIN — LIDOCAINE HYDROCHLORIDE 1 ML: 10 INJECTION, SOLUTION INFILTRATION; PERINEURAL at 12:52

## 2019-11-01 RX ADMIN — BETAMETHASONE SODIUM PHOSPHATE AND BETAMETHASONE ACETATE 6 MG: 3; 3 INJECTION, SUSPENSION INTRA-ARTICULAR; INTRALESIONAL; INTRAMUSCULAR; SOFT TISSUE at 12:52

## 2019-11-01 NOTE — LETTER
November 1, 2019     Patient: Alistair Diggs   YOB: 1970   Date of Visit: 11/1/2019       To Whom it May Concern:    Leeann Browne is under my professional care  She was seen in my office on 11/1/2019  She may return to work on 11/4/19 with a 5lb lifting restriction  She will be re evaluated after MRI  If you have any questions or concerns, please don't hesitate to call           Sincerely,          Felicity Cranker, MD        CC: No Recipients

## 2019-11-01 NOTE — PROGRESS NOTES
ASSESSMENT/PLAN:    Assessment:   Right ring finger tenosynovitis  ECU tendonitis verses occult hamate versus triquetral fracture    Plan:   The patient will be placed in a right wrist splint today which she was instructed to wear at all times except for hygiene  We will order an MRI of the patient's right wrist to evaluate for ECU tendinitis versus occult fracture  The patient was given a work note today returning her on Monday with a 5 lb lifting restriction  Follow Up: After Testing    To Do Next Visit:       General Discussions:     Trigger FInger: The anatomy and physiology of trigger finger was discussed with the patient today in the office  Edema and increased contact pressure within the flexor tendons at the A1 pulley can cause pain, crepitation, and limitation of function  Treatment options include resting MP blocking splints to decrease edema, oral anti-inflammatory medications, home or formal therapy exercises, up to 2 steroid injections within the tendon sheath, or surgical release  While majority of patients do respond to conservative treatment, up to 20% may require surgical release  Operative Discussions:       _____________________________________________________  CHIEF COMPLAINT:  Chief Complaint   Patient presents with    Right Hand - Injury, Pain         SUBJECTIVE:  Carolin Franco is a 57 y o  female who presents with Pain  Severe  Intermittant  Sharp to the right hand and wrist   This started  2 week(s) ago as Due to a work injury  Patient states that she fell on the sidewalk and steps going into work  Patient notices numbness and tingling on occasion in the morning  Patient noticed difficulties using his her right foot and leg as well as holding items  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    Radiation: Yes to the  arm  Previous Treatments: activity modification, ice and epson salt soaks with only partial relief  Associated symptoms: Catching of ring finger on right hand  PAST MEDICAL HISTORY:  Past Medical History:   Diagnosis Date    Cervicalgia     disc displacement,radiculopathy    Depression     Disease of thyroid gland     Hypertension     last assessed: 8/2/2016    Migraine     Myofascial pain syndrome        PAST SURGICAL HISTORY:  Past Surgical History:   Procedure Laterality Date    BREAST BIOPSY Right     biospy w/ marker    CARPAL TUNNEL RELEASE Right     GANGLION CYST EXCISION Left 8/3/2017    Procedure: WRIST/HAND MASS EXCISION;  Surgeon: Robbie Thompson MD;  Location:  MAIN OR;  Service: Orthopedics    HYSTERECTOMY         FAMILY HISTORY:  Family History   Problem Relation Age of Onset   Georgi Villarreal Hypertension Mother    Georgi Villarreal Other Mother         Bladder surgery    Diabetes Father     Hypertension Father     Leukemia Sister     No Known Problems Maternal Aunt     No Known Problems Maternal Aunt     Cancer Maternal Grandmother         Bladder    Dementia Maternal Grandfather     Leukemia Paternal Grandmother        SOCIAL HISTORY:  Social History     Tobacco Use    Smoking status: Never Smoker    Smokeless tobacco: Never Used   Substance Use Topics    Alcohol use: No     Comment: per [employer]-drinks socially    Drug use: No       MEDICATIONS:    Current Outpatient Medications:     ALPRAZolam (XANAX) 0 25 mg tablet, TAKE 1 TABLET(0 25 MG) BY MOUTH THREE TIMES DAILY AS NEEDED FOR ANXIETY, Disp: 60 tablet, Rfl: 3    amitriptyline (ELAVIL) 50 mg tablet, Take 1 tablet (50 mg total) by mouth daily at bedtime, Disp: , Rfl:     aspirin 81 MG tablet, Take 81 mg by mouth daily  , Disp: , Rfl:     Botulinum Toxin Type A 200 units SOLR, INJECT UP  UNITS ONCE EVERY 3 MONTHS AS DIRECTED   IM INTO HEAD AND NECK AREA , Disp: 200 Units, Rfl: 3    butalbital-acetaminophen-caffeine (FIORICET,ESGIC) -40 mg per tablet, Take 1 tablet by mouth every 4 (four) hours as needed for headaches, Disp: 30 tablet, Rfl: 1   Cholecalciferol (VITAMIN D3) 1000 units CAPS, Take by mouth, Disp: , Rfl:     coenzyme Q-10 100 MG capsule, Take 100 mg by mouth as needed, Disp: , Rfl:     dexamethasone (DECADRON) 2 mg tablet, Take 1 tablet (2 mg total) by mouth daily as needed (migraine), Disp: 5 tablet, Rfl: 0    Glucosamine-Chondroit-Vit C-Mn (GLUCOSAMINE 1500 COMPLEX PO), Take 2 tablets by mouth daily, Disp: , Rfl:     levothyroxine 25 mcg tablet, TAKE 1 TABLET DAILY, Disp: 90 tablet, Rfl: 1    lisinopril (ZESTRIL) 10 mg tablet, Take 1 tablet (10 mg total) by mouth daily, Disp: 90 tablet, Rfl: 3    magnesium gluconate (MAGONATE) 500 mg tablet, Take 500 mg by mouth 2 (two) times a day , Disp: , Rfl:     POTASSIUM CITRATE ER PO, Take 99 mg by mouth daily, Disp: , Rfl:     prochlorperazine (COMPAZINE) 10 mg tablet, Take 1 tablet (10 mg total) by mouth every 6 (six) hours as needed for nausea or vomiting, Disp: 10 tablet, Rfl: 0    Riboflavin (VITAMIN B-2) 25 MG TABS, Take by mouth, Disp: , Rfl:     rizatriptan (MAXALT-MLT) 10 MG disintegrating tablet, May repeat in 2 hours if needed, Disp: 9 tablet, Rfl: 0    zonisamide (ZONEGRAN) 100 mg capsule, TAKE 1 CAPSULE DAILY AT BEDTIME, Disp: 90 capsule, Rfl: 1    predniSONE 10 mg tablet, 3 tabs po bid x2 days, then 2 tabs po bid x2 days, then 1 tab bid x2 days, then 1 daily until done  (Patient not taking: Reported on 10/17/2019), Disp: 26 tablet, Rfl: 0    Current Facility-Administered Medications:     prochlorperazine edisylate (COMPAZINE) injection 10 mg, 10 mg, Intramuscular, Q4H PRN, Orquidea Esquivel PA-C, 10 mg at 01/26/18 1517    ALLERGIES:  Allergies   Allergen Reactions    Levofloxacin Anaphylaxis and Swelling     Patient reports face swelled, throat was starting to close  REVIEW OF SYSTEMS:  Pertinent items are noted in HPI      LABS:  HgA1c:   Lab Results   Component Value Date    HGBA1C 4 7 10/09/2014     BMP:   Lab Results   Component Value Date    GLUCOSE 91 12/20/2014 CALCIUM 8 8 08/06/2019     12/20/2014    K 3 7 08/06/2019    CO2 21 08/06/2019     08/06/2019    BUN 12 08/06/2019    CREATININE 1 01 08/06/2019         _____________________________________________________  PHYSICAL EXAMINATION:  Vital signs: /87   Pulse 89   Ht 5' (1 524 m)   Wt 88 5 kg (195 lb)   LMP 11/18/2015   BMI 38 08 kg/m²   General: well developed and well nourished, alert, oriented times 3 and appears comfortable  Psychiatric: Normal  HEENT: Trachea Midline, No torticollis  Cardiovascular: No discernable arrhythmia  Pulmonary: No wheezing or stridor  Skin: No masses, erythema, fluctation, ulcerations  Neurovascular: Sensation Intact to the Median, Ulnar, Radial Nerve, Motor Intact to the Median, Ulnar, Radial Nerve and Pulses Intact    MUSCULOSKELETAL EXAMINATION:  RIGHT SIDE:  Wrist: no crepitation, full ROM, no instability, negative TFCC circumduction, tenderness to triquetrum, LT shuck test causes pain, no pisiform tenderness or hook of hamate tenderness, no tenderness thumb cmc    _____________________________________________________  STUDIES REVIEWED:  Images were reviewd in PACS: xray of right hand on 10/18/19 demonstrates possible occult fracture of triquetrum verses hamate        PROCEDURES PERFORMED:  Hand/upper extremity injection: R ring A1  Date/Time: 11/1/2019 12:52 PM  Consent given by: patient  Site marked: site marked  Supporting Documentation  Indications: tendon swelling   Procedure Details  Condition:trigger finger Location: ring finger - R ring A1   Medications administered: 1 mL lidocaine 1 %; 6 mg betamethasone acetate-betamethasone sodium phosphate 6 (3-3) mg/mL    Patient tolerance: patient tolerated the procedure well with no immediate complications  Dressing:  Sterile dressing applied             Scribe Attestation    I,:   Chan Joseph am acting as a scribe while in the presence of the attending physician :        I,:   Debbie Cheatham MD personally performed the services described in this documentation    as scribed in my presence :

## 2019-11-05 ENCOUNTER — TELEPHONE (OUTPATIENT)
Dept: OBGYN CLINIC | Facility: CLINIC | Age: 49
End: 2019-11-05

## 2019-11-05 NOTE — TELEPHONE ENCOUNTER
Dr Aidan Fuentes script for wrist mri was faxed toT[facility] at [facility] [phone]    No need to respond  Thank you

## 2019-11-06 ENCOUNTER — TELEPHONE (OUTPATIENT)
Dept: OBGYN CLINIC | Facility: HOSPITAL | Age: 49
End: 2019-11-06

## 2019-11-06 NOTE — TELEPHONE ENCOUNTER
Caller:Nano from [facility]  Call back:  Cory called to request the MRI order be faxed to [phone]  I faxed the order, as requested  No further action required

## 2019-11-25 ENCOUNTER — HOSPITAL ENCOUNTER (OUTPATIENT)
Dept: MAMMOGRAPHY | Facility: CLINIC | Age: 49
Discharge: HOME | End: 2019-11-25
Payer: COMMERCIAL

## 2019-11-25 DIAGNOSIS — R92.1 BREAST CALCIFICATION, RIGHT: ICD-10-CM

## 2019-11-25 DIAGNOSIS — R92.1 BREAST CALCIFICATION, LEFT: ICD-10-CM

## 2019-11-25 PROCEDURE — 77066 DX MAMMO INCL CAD BI: CPT

## 2019-11-25 PROCEDURE — G0279 TOMOSYNTHESIS, MAMMO: HCPCS

## 2019-11-26 ENCOUNTER — APPOINTMENT (OUTPATIENT)
Dept: RADIOLOGY | Facility: AMBULARY SURGERY CENTER | Age: 49
End: 2019-11-26
Attending: ORTHOPAEDIC SURGERY
Payer: COMMERCIAL

## 2019-11-26 ENCOUNTER — OFFICE VISIT (OUTPATIENT)
Dept: OBGYN CLINIC | Facility: CLINIC | Age: 49
End: 2019-11-26
Payer: OTHER MISCELLANEOUS

## 2019-11-26 VITALS
HEIGHT: 60 IN | HEART RATE: 87 BPM | WEIGHT: 192 LBS | SYSTOLIC BLOOD PRESSURE: 137 MMHG | BODY MASS INDEX: 37.69 KG/M2 | DIASTOLIC BLOOD PRESSURE: 98 MMHG

## 2019-11-26 DIAGNOSIS — M25.511 ACUTE PAIN OF RIGHT SHOULDER: ICD-10-CM

## 2019-11-26 DIAGNOSIS — M24.811 INTERNAL DERANGEMENT OF SHOULDER, RIGHT: Primary | ICD-10-CM

## 2019-11-26 PROCEDURE — 73030 X-RAY EXAM OF SHOULDER: CPT

## 2019-11-26 PROCEDURE — 99214 OFFICE O/P EST MOD 30 MIN: CPT | Performed by: ORTHOPAEDIC SURGERY

## 2019-11-26 NOTE — LETTER
November 26, 2019     Patient: Heydi Rodriguez   YOB: 1970   Date of Visit: 11/26/2019       To Whom it May Concern:    Lluvia Acosta is under my professional care  She was seen in my office on 11/26/2019  She max lifting 5 lbs with right arm, no overhead use, no repetitive motions  If you have any questions or concerns, please don't hesitate to call           Sincerely,          Tracy Zurita MD        CC: No Recipients

## 2019-11-26 NOTE — PROGRESS NOTES
Assessment:   Diagnosis ICD-10-CM Associated Orders   1  Internal derangement of shoulder, right M24 811 MRI shoulder right wo contrast   2  Acute pain of right shoulder M25 511 Ambulatory referral to Orthopedic Surgery     XR shoulder 2+ vw right       Plan:    Work injury 10/18/19 fall onto outstretched right hand, injuring shoulder  Highly suspicious for rotator cuff tear, exam weakness, positive empty can, cross arm, rodriguez, impingement, speeds  See back after MRI to determine definitive treatment  Work note given       To do next visit:  Return for after MRI   The above stated was discussed in layman's terms and the patient expressed understanding  All questions were answered to the patient's satisfaction  Scribe Attestation    I,:   Jin Rodriguez am acting as a scribe while in the presence of the attending physician :        I,:   Elpidio Metz MD personally performed the services described in this documentation    as scribed in my presence :              Subjective:   Darron Saunders is a 54 y o  female who presents for evaluation of her right shoulder  Fall going into work 10/18/19, tripped over crack in sidewalk and landed on outstretched hands  initially after fall wrist was bothering her the most, week later the shoulder really started to bother her  Pain with overhead motions, extending to reach for an item, she notes weakness due to pain  Pain is global, refer down biceps into forearm  She is not able to sleep on right shoulder, wakes from sleep  She has had no treatment on right shoulder  She denies numbness or tingling down arm  She is          Review of systems negative unless otherwise specified in HPI    Past Medical History:   Diagnosis Date    Cervicalgia     disc displacement,radiculopathy    Depression     Disease of thyroid gland     Hypertension     last assessed: 8/2/2016    Migraine     Myofascial pain syndrome        Past Surgical History: Procedure Laterality Date    BREAST BIOPSY Right     biospy w/ marker    CARPAL TUNNEL RELEASE Right     GANGLION CYST EXCISION Left 8/3/2017    Procedure: WRIST/HAND MASS EXCISION;  Surgeon: Joseph Ardon MD;  Location: [facility];  Service: Orthopedics    HYSTERECTOMY         Family History   Problem Relation Age of Onset    Hypertension Mother    Elizabeth Splinter Other Mother         Bladder surgery    Diabetes Father     Hypertension Father     Leukemia Sister     No Known Problems Maternal Aunt     No Known Problems Maternal Aunt     Cancer Maternal Grandmother         Bladder    Dementia Maternal Grandfather     Leukemia Paternal Grandmother        Social History     Occupational History    Not on file   Tobacco Use    Smoking status: Never Smoker    Smokeless tobacco: Never Used   Substance and Sexual Activity    Alcohol use: No     Comment: per Allscripts-drinks socially    Drug use: No    Sexual activity: Not on file         Current Outpatient Medications:     ALPRAZolam (XANAX) 0 25 mg tablet, TAKE 1 TABLET(0 25 MG) BY MOUTH THREE TIMES DAILY AS NEEDED FOR ANXIETY, Disp: 60 tablet, Rfl: 3    amitriptyline (ELAVIL) 50 mg tablet, Take 1 tablet (50 mg total) by mouth daily at bedtime, Disp: , Rfl:     aspirin 81 MG tablet, Take 81 mg by mouth daily  , Disp: , Rfl:     Botulinum Toxin Type A 200 units SOLR, INJECT UP  UNITS ONCE EVERY 3 MONTHS AS DIRECTED   IM INTO HEAD AND NECK AREA , Disp: 200 Units, Rfl: 3    butalbital-acetaminophen-caffeine (FIORICET,ESGIC) -40 mg per tablet, Take 1 tablet by mouth every 4 (four) hours as needed for headaches, Disp: 30 tablet, Rfl: 1    Cholecalciferol (VITAMIN D3) 1000 units CAPS, Take by mouth, Disp: , Rfl:     coenzyme Q-10 100 MG capsule, Take 100 mg by mouth as needed, Disp: , Rfl:     dexamethasone (DECADRON) 2 mg tablet, Take 1 tablet (2 mg total) by mouth daily as needed (migraine), Disp: 5 tablet, Rfl: 0    Glucosamine-Chondroit-Vit C-Mn (GLUCOSAMINE 1500 COMPLEX PO), Take 2 tablets by mouth daily, Disp: , Rfl:     levothyroxine 25 mcg tablet, TAKE 1 TABLET DAILY, Disp: 90 tablet, Rfl: 1    lisinopril (ZESTRIL) 10 mg tablet, Take 1 tablet (10 mg total) by mouth daily, Disp: 90 tablet, Rfl: 3    magnesium gluconate (MAGONATE) 500 mg tablet, Take 500 mg by mouth 2 (two) times a day , Disp: , Rfl:     POTASSIUM CITRATE ER PO, Take 99 mg by mouth daily, Disp: , Rfl:     predniSONE 10 mg tablet, 3 tabs po bid x2 days, then 2 tabs po bid x2 days, then 1 tab bid x2 days, then 1 daily until done  (Patient not taking: Reported on 10/17/2019), Disp: 26 tablet, Rfl: 0    prochlorperazine (COMPAZINE) 10 mg tablet, Take 1 tablet (10 mg total) by mouth every 6 (six) hours as needed for nausea or vomiting, Disp: 10 tablet, Rfl: 0    Riboflavin (VITAMIN B-2) 25 MG TABS, Take by mouth, Disp: , Rfl:     rizatriptan (MAXALT-MLT) 10 MG disintegrating tablet, May repeat in 2 hours if needed, Disp: 9 tablet, Rfl: 0    zonisamide (ZONEGRAN) 100 mg capsule, TAKE 1 CAPSULE DAILY AT BEDTIME, Disp: 90 capsule, Rfl: 1    Current Facility-Administered Medications:     prochlorperazine edisylate (COMPAZINE) injection 10 mg, 10 mg, Intramuscular, Q4H PRN, Beka Berkowitz PA-C, 10 mg at 01/26/18 1517    Allergies   Allergen Reactions    Levofloxacin Anaphylaxis and Swelling     Patient reports face swelled, throat was starting to close  Vitals:    11/26/19 1009   BP: 137/98   Pulse: 87       Objective:  Constitutional: Well-developed and well-nourished  Eyes: Anicteric sclerae  Lungs: Unlabored breathing  Cardiovascular: Capillary refill is less than 2 seconds  Skin: Intact without erythema  Neurologic: Sensation intact to light touch  Psychiatric: Mood and affect are appropriate                      Right Shoulder Exam     Tenderness   The patient is experiencing tenderness in the biceps tendon and acromioclavicular joint (lateral shoulder )     Range of Motion   Active abduction: 170   Passive abduction: 170   External rotation: 80   Forward flexion: 170     Muscle Strength   Abduction: 4/5   Internal rotation: 4/5   External rotation: 4/5     Tests   Cross arm: positive  Impingement: positive    Other   Erythema: absent  Scars: absent  Sensation: normal  Pulse: present    Comments:  Positive empty can  Positive speeds  Negative neer     Pain at end range of motion of all planes             Diagnostics, reviewed and taken today if performed as documented: The attending physician has personally reviewed the pertinent films in PACS and interpretation is as follows:  XR right shoulder no significant degenerative changes, no osseus abnormalities  Procedures, if performed today:    Procedures    None performed      Portions of the record may have been created with voice recognition software  Occasional wrong word or "sound a like" substitutions may have occurred due to the inherent limitations of voice recognition software  Read the chart carefully and recognize, using context, where substitutions have occurred

## 2019-11-27 ENCOUNTER — TELEPHONE (OUTPATIENT)
Dept: OBGYN CLINIC | Facility: HOSPITAL | Age: 49
End: 2019-11-27

## 2019-11-27 NOTE — TELEPHONE ENCOUNTER
Emanuel from [facility] called in  Faxed office notes and work status note from office visit Dr Jasen Quinteros    Cb# [phone]  Fax# [phone]

## 2019-12-04 DIAGNOSIS — M79.644 FINGER PAIN, RIGHT: Primary | ICD-10-CM

## 2019-12-05 ENCOUNTER — TELEPHONE (OUTPATIENT)
Dept: OBGYN CLINIC | Facility: HOSPITAL | Age: 49
End: 2019-12-05

## 2019-12-05 NOTE — TELEPHONE ENCOUNTER
I called patient and informed her that per Dr Juan Manuel Arciniega she does not need for follow up on Monday  Patient spoke with Macy Bence, P A -C yesterday and was made aware that she is to do therapy but patient does not think she can go to [place] [address] for it due to her insurance  She said once she finds out where she can go she will call us to let us know when she is scheduled  Per Dr Juan Manuel Arciniega she needs a follow up 6-8 weeks after she completes therapy

## 2019-12-05 NOTE — TELEPHONE ENCOUNTER
----- Message from Marty Hamman, PA-C sent at 12/5/2019  9:04 AM EST -----  Dr Nataly Wilkes said pt does NOT need to follow up on Monday but would recommend a follow up in 6-8 weeks after she's done therapy to see if her symptoms are improved  Could you help arrange this? Thanks!

## 2019-12-10 ENCOUNTER — OFFICE VISIT (OUTPATIENT)
Dept: OBGYN CLINIC | Facility: CLINIC | Age: 49
End: 2019-12-10
Payer: OTHER MISCELLANEOUS

## 2019-12-10 VITALS
DIASTOLIC BLOOD PRESSURE: 88 MMHG | HEIGHT: 65 IN | WEIGHT: 192 LBS | BODY MASS INDEX: 31.99 KG/M2 | HEART RATE: 92 BPM | SYSTOLIC BLOOD PRESSURE: 138 MMHG

## 2019-12-10 DIAGNOSIS — S46.011A ROTATOR CUFF STRAIN, RIGHT, INITIAL ENCOUNTER: Primary | ICD-10-CM

## 2019-12-10 PROCEDURE — 99213 OFFICE O/P EST LOW 20 MIN: CPT | Performed by: ORTHOPAEDIC SURGERY

## 2019-12-10 NOTE — LETTER
December 10, 2019     Patient: Shayan Reese   YOB: 1970   Date of Visit: 12/10/2019       To Whom it May Concern:    Rebekah Grimes is under my professional care  She was seen in my office on 12/10/2019  She is cleared to work with modified duty from 12/10/2019 until the next evaluation in 1 month: Maximum lifting 10 pounds occasionally, no work above shoulder level, and no repetitive use of the right upper extremity  If you have any questions or concerns, please don't hesitate to call      Sincerely,          Vladimir Mcclellan MD

## 2019-12-13 ENCOUNTER — TELEPHONE (OUTPATIENT)
Dept: NEUROLOGY | Facility: CLINIC | Age: 49
End: 2019-12-13

## 2019-12-13 NOTE — TELEPHONE ENCOUNTER
Type Date User Summary Attachment   General 12/13/2019 11:43 AM Doreen Davis care coordination  -   Note    Botox- no authorization needed   Please use [facility]      Thank you,     [facility]

## 2019-12-15 DIAGNOSIS — G43.709 CHRONIC MIGRAINE WITHOUT AURA WITHOUT STATUS MIGRAINOSUS, NOT INTRACTABLE: ICD-10-CM

## 2019-12-16 DIAGNOSIS — G43.709 CHRONIC MIGRAINE WITHOUT AURA WITHOUT STATUS MIGRAINOSUS, NOT INTRACTABLE: ICD-10-CM

## 2019-12-16 RX ORDER — AMITRIPTYLINE HYDROCHLORIDE 25 MG/1
TABLET, FILM COATED ORAL
Qty: 90 TABLET | Refills: 4 | OUTPATIENT
Start: 2019-12-16

## 2019-12-16 RX ORDER — AMITRIPTYLINE HYDROCHLORIDE 50 MG/1
50 TABLET, FILM COATED ORAL
Qty: 90 TABLET | Refills: 2 | Status: SHIPPED | OUTPATIENT
Start: 2019-12-16 | End: 2020-08-25

## 2019-12-16 NOTE — TELEPHONE ENCOUNTER
Called [facility]- spoke with Jakob- I made him aware I was calling to initiate a refill for the patient's Botox prescription  Refill request initiated  He is aware that I need this delivered by Friday 12/20/19  Order marked as STAT  Patient's consent is not on file  Will do a status check in 2 days

## 2019-12-18 NOTE — TELEPHONE ENCOUNTER
Called [facility]- spoke with Eneida Varma- she states the patient's Botox order is in insurance verification  Order is marked as STAT  Will do a status check tomorrow

## 2019-12-19 ENCOUNTER — TELEPHONE (OUTPATIENT)
Dept: OBGYN CLINIC | Facility: HOSPITAL | Age: 49
End: 2019-12-19

## 2019-12-20 NOTE — TELEPHONE ENCOUNTER
Called [facility]- spoke with Idalia- she states the patient's Botox order is currently in insurance verification  Order is marked as STAT  She is aware that I will need the patient's Botox by next week  She states she will e-mail the insurance team to make them aware so that this is expedited  Will do a status check on Monday

## 2019-12-23 NOTE — TELEPHONE ENCOUNTER
Called [facility]- spoke with Guido Finely- he states the patient's Botox order is currently in insurance verification  Will do a status check in 2 days

## 2019-12-26 NOTE — TELEPHONE ENCOUNTER
Called [facility]- spoke with Mike Zapata- she states the patient's Botox order is ready to be scheduled for delivery  Patient's consent is on file  Botox to be delivered on Tuesday 12/31/2019 [facility]- a signature will be required  Sven Paul,    Please await Botox delivery and document once it has arrived  Please let me know if you do not receive the patient's Botox order      Thank you,    Xi Gonzalez

## 2019-12-28 DIAGNOSIS — E03.9 HYPOTHYROIDISM, UNSPECIFIED TYPE: ICD-10-CM

## 2019-12-30 DIAGNOSIS — G43.719 INTRACTABLE CHRONIC MIGRAINE WITHOUT AURA AND WITHOUT STATUS MIGRAINOSUS: ICD-10-CM

## 2019-12-30 RX ORDER — LEVOTHYROXINE SODIUM 0.03 MG/1
TABLET ORAL
Qty: 90 TABLET | Refills: 4 | Status: SHIPPED | OUTPATIENT
Start: 2019-12-30 | End: 2021-03-08

## 2019-12-30 NOTE — TELEPHONE ENCOUNTER
Patient is requesting a refill on both her Compazine 10mg and Rizatriptan 10mg  Patient next f/u appointment is schedule on 1/6/2020

## 2020-01-02 RX ORDER — RIZATRIPTAN BENZOATE 10 MG/1
TABLET, ORALLY DISINTEGRATING ORAL
Qty: 9 TABLET | Refills: 0 | Status: SHIPPED | OUTPATIENT
Start: 2020-01-02 | End: 2020-04-29 | Stop reason: SDUPTHER

## 2020-01-02 RX ORDER — PROCHLORPERAZINE MALEATE 10 MG
10 TABLET ORAL EVERY 6 HOURS PRN
Qty: 10 TABLET | Refills: 0 | Status: SHIPPED | OUTPATIENT
Start: 2020-01-02 | End: 2020-04-29 | Stop reason: SDUPTHER

## 2020-01-06 ENCOUNTER — PROCEDURE VISIT (OUTPATIENT)
Dept: NEUROLOGY | Facility: CLINIC | Age: 50
End: 2020-01-06
Payer: COMMERCIAL

## 2020-01-06 VITALS — HEART RATE: 79 BPM | SYSTOLIC BLOOD PRESSURE: 124 MMHG | DIASTOLIC BLOOD PRESSURE: 86 MMHG | TEMPERATURE: 99 F

## 2020-01-06 DIAGNOSIS — G43.709 CHRONIC MIGRAINE WITHOUT AURA WITHOUT STATUS MIGRAINOSUS, NOT INTRACTABLE: Primary | ICD-10-CM

## 2020-01-06 PROCEDURE — 64615 CHEMODENERV MUSC MIGRAINE: CPT | Performed by: PHYSICIAN ASSISTANT

## 2020-01-06 NOTE — PROGRESS NOTES
Chemodenervation  Date/Time: 1/6/2020 3:36 PM  Performed by: Kirsten Ocampo PA-C  Authorized by: Kirsten Ocampo PA-C     Pre-procedure details:     Prepped With: Alcohol    Anesthesia (see MAR for exact dosages): Anesthesia method:  None  Procedure details:     Position:  Upright  Botox:     Botox Type:  Type A    Brand:  Botox    mL's of Botulinum Toxin:  200    Final Concentration per CC:  200 units    Needle Gauge:  30 G 2 5 inch  Procedures:     Botox Procedures: chronic headache      Indications: migraines    Injection Location:     Head / Face:  L superior cervical paraspinal, R superior cervical paraspinal, L , R , L frontalis, R frontalis, L medial occipitalis, R medial occipitalis, procerus, R temporalis, L temporalis, R superior trapezius and L superior trapezius    L  injection amount:  5 unit(s)    R  injection amount:  5 unit(s)    L lateral frontalis:  5 unit(s)    R lateral frontalis:  5 unit(s)    L medial frontalis:  5 unit(s)    R medial frontalis:  5 unit(s)    L temporalis injection amount:  20 unit(s)    R temporalis injection amount:  20 unit(s)    Procerus injection amount:  5 unit(s)    L medial occipitalis injection amount:  15 unit(s)    R medial occipitalis injection amount:  15 unit(s)    L superior cervical paraspinal injection amount:  10 unit(s)    R superior cervical paraspinal injection amount:  10 unit(s)    L superior trapezius injection amount:  15 unit(s)    R superior trapezius injection amount:  15 unit(s)  Total Units:     Total units used:  200    Total units discarded:  0  Post-procedure details:     Chemodenervation:  Chronic migraine    Facial Nerve Location[de-identified]  Bilateral facial nerve    Patient tolerance of procedure:   Tolerated well, no immediate complications  Comments:      45 units frontalis  All medically necessary

## 2020-01-08 ENCOUNTER — TELEPHONE (OUTPATIENT)
Dept: OBGYN CLINIC | Facility: HOSPITAL | Age: 50
End: 2020-01-08

## 2020-01-08 NOTE — TELEPHONE ENCOUNTER
Patient sees Dr Lauren Perez Keep from [employer] is calling in wanting to know if the patient was seen yesterday on 1/7 and if she has any follow up appointments coming up

## 2020-01-18 DIAGNOSIS — G43.719 INTRACTABLE CHRONIC MIGRAINE WITHOUT AURA AND WITHOUT STATUS MIGRAINOSUS: ICD-10-CM

## 2020-01-21 RX ORDER — ZONISAMIDE 100 MG/1
CAPSULE ORAL
Qty: 90 CAPSULE | Refills: 2 | Status: SHIPPED | OUTPATIENT
Start: 2020-01-21 | End: 2020-09-24

## 2020-01-28 ENCOUNTER — OFFICE VISIT (OUTPATIENT)
Dept: OBGYN CLINIC | Facility: CLINIC | Age: 50
End: 2020-01-28
Payer: OTHER MISCELLANEOUS

## 2020-01-28 VITALS
DIASTOLIC BLOOD PRESSURE: 83 MMHG | HEIGHT: 65 IN | BODY MASS INDEX: 32.49 KG/M2 | WEIGHT: 195 LBS | SYSTOLIC BLOOD PRESSURE: 134 MMHG | HEART RATE: 91 BPM

## 2020-01-28 DIAGNOSIS — S46.011D ROTATOR CUFF STRAIN, RIGHT, SUBSEQUENT ENCOUNTER: Primary | ICD-10-CM

## 2020-01-28 PROCEDURE — 99213 OFFICE O/P EST LOW 20 MIN: CPT | Performed by: ORTHOPAEDIC SURGERY

## 2020-01-28 NOTE — PROGRESS NOTES
Patient Name:  Daisy Barlow  MRN:  [id number]    [address] [place] [address]     Right Shoulder Rotator Cuff Strain from work injury on 10/18/19    1  Continue formal physical therapy and home exercise program for rotator cuff strain/tendinitis  2  Will continue work restrictions of 10 lb max lifting with no repetitive use of the right upper extremity  Note was provided today  3  Will consider cortisone injection at next office visit if symptoms persist   4  Follow-up in 6 weeks for re-evaluation of symptoms  Subjective     53 y/o female who presents today for a follow up visit for her right shoulder  Patient was initially injured on 10/18/19 when she fell and tripped over a crack in the sidewalk and landed on an outstretched right upper extremity  Patient notes that she continues to have an intermittent "dull, aching" sensation about the anterolateral aspect of her shoulder  She states that her symptoms are worse with repetitive and overhead motions  Patient states that her pain/symptoms have improved since her last visit but are still present intermittently, 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  General ROS:  Negative for fever or chills  Neurological ROS:  Negative for numbness or tingling  Objective     /83   Pulse 91   Ht 5' 5" (1 651 m)   Wt 88 5 kg (195 lb)   LMP 11/18/2015   BMI 32 45 kg/m²     Right shoulder:  Deformity: None  No erythema  Tenderness to palpation: Diffusely about the shoulder  Range of motion:  FF: 150  ER-abduction: 100  IR-abduction: 60  Strength  FF: 5/5  ER: 5/5  IR: 5/5  Neer impingement sign: Positive  Frank impingement sign: Positive  Empty can test: Negative  Speed's test: Negative  Cross-body adduction test: Positive  Patient is neurovascular intact distally   2+ radial pulse  Psychiatric: Mood and affect are appropriate    Social History     Tobacco Use    Smoking status: Never Smoker    Smokeless tobacco: Never Used   Substance Use Topics  Alcohol use: No     Comment: per [employer]scripts-drinks socially    Drug use: No       Scribe Attestation    I,:   Andrea Campos am acting as a scribe while in the presence of the attending physician :        I,:   Chad Carroll MD personally performed the services described in this documentation    as scribed in my presence :

## 2020-01-28 NOTE — LETTER
January 28, 2020     Patient: Benito Price   YOB: 1970   Date of Visit: 1/28/2020       To Whom it May Concern:    Crow Mosquera is under my professional care  She was seen in my office on 1/28/2020  She may return to work with limitations of 10 lbs  max lifting with no repetitive use of the right upper extremity  If you have any questions or concerns, please don't hesitate to call           Sincerely,          Neelima Jhaveri MD        CC: No Recipients

## 2020-01-29 ENCOUNTER — OFFICE VISIT (OUTPATIENT)
Dept: OBGYN CLINIC | Facility: HOSPITAL | Age: 50
End: 2020-01-29
Payer: OTHER MISCELLANEOUS

## 2020-01-29 ENCOUNTER — HOSPITAL ENCOUNTER (OUTPATIENT)
Dept: RADIOLOGY | Facility: HOSPITAL | Age: 50
Discharge: HOME | End: 2020-01-29
Attending: ORTHOPAEDIC SURGERY
Payer: OTHER MISCELLANEOUS

## 2020-01-29 VITALS
BODY MASS INDEX: 32.45 KG/M2 | DIASTOLIC BLOOD PRESSURE: 88 MMHG | SYSTOLIC BLOOD PRESSURE: 123 MMHG | HEIGHT: 65 IN | HEART RATE: 94 BPM

## 2020-01-29 DIAGNOSIS — M54.2 NECK PAIN: ICD-10-CM

## 2020-01-29 DIAGNOSIS — M54.12 CERVICAL RADICULOPATHY: Primary | ICD-10-CM

## 2020-01-29 PROCEDURE — 99214 OFFICE O/P EST MOD 30 MIN: CPT | Performed by: ORTHOPAEDIC SURGERY

## 2020-01-29 PROCEDURE — 72050 X-RAY EXAM NECK SPINE 4/5VWS: CPT

## 2020-01-29 NOTE — PROGRESS NOTES
ASSESSMENT/PLAN:    Assessment:   Right ring finger tenosynovitis  Right ECU tendonitis   New onset of cervical radiculopathy     Plan:   Patient will begin formal physical therapy for a cervical program at this time  She has no formal restrictions at this time  We will refer the patient to Dr Suzanna Perez at this time for an evaluation of her cervical spine due to her significant findings on her xray today  Follow Up:  PRN    To Do Next Visit:       _____________________________________________________  CHIEF COMPLAINT:  Chief Complaint   Patient presents with    Right Wrist - Follow-up         SUBJECTIVE:  Paola Flores is a 54 y o  female who presents for follow up regarding Right ring finger tenosynovitis and ECU tendonitis verses occult hamate versus triquetral fracture  Since last visit, Paola Flores has tried therapy with only partial relief  Today there is Pain  Moderate  Intermittant  Sharp, Burning and Aching and Numbness to the right wrist and hand  Patient has a hard time gripping and squeezing  She has yet to be able to return to driving  Patient states that the numbness is a new compliant today  She does complain of neck pain today     Radiation: Yes to the  wrist and hand  Associated symptoms: No Complaints    PAST MEDICAL HISTORY:  Past Medical History:   Diagnosis Date    Cervicalgia     disc displacement,radiculopathy    Depression     Disease of thyroid gland     Hypertension     last assessed: 8/2/2016    Migraine     Myofascial pain syndrome        PAST SURGICAL HISTORY:  Past Surgical History:   Procedure Laterality Date    BREAST BIOPSY Right     biospy w/ marker    CARPAL TUNNEL RELEASE Right     GANGLION CYST EXCISION Left 8/3/2017    Procedure: WRIST/HAND MASS EXCISION;  Surgeon: Robbie Thompson MD;  Location: [facility];  Service: Orthopedics    HYSTERECTOMY         FAMILY HISTORY:  Family History   Problem Relation Age of Onset    Hypertension Mother    Georgi Villarreal Other Mother         Bladder surgery    Diabetes Father     Hypertension Father     Leukemia Sister     No Known Problems Maternal Aunt     No Known Problems Maternal Aunt     Cancer Maternal Grandmother         Bladder    Dementia Maternal Grandfather     Leukemia Paternal Grandmother        SOCIAL HISTORY:  Social History     Tobacco Use    Smoking status: Never Smoker    Smokeless tobacco: Never Used   Substance Use Topics    Alcohol use: No     Comment: per Allscripts-drinks socially    Drug use: No       MEDICATIONS:    Current Outpatient Medications:     ALPRAZolam (XANAX) 0 25 mg tablet, TAKE 1 TABLET(0 25 MG) BY MOUTH THREE TIMES DAILY AS NEEDED FOR ANXIETY, Disp: 60 tablet, Rfl: 3    amitriptyline (ELAVIL) 50 mg tablet, Take 1 tablet (50 mg total) by mouth daily at bedtime, Disp: 90 tablet, Rfl: 2    aspirin 81 MG tablet, Take 81 mg by mouth daily  , Disp: , Rfl:     Botulinum Toxin Type A 200 units SOLR, INJECT UP  UNITS ONCE EVERY 3 MONTHS AS DIRECTED   IM INTO HEAD AND NECK AREA , Disp: 200 Units, Rfl: 3    butalbital-acetaminophen-caffeine (FIORICET,ESGIC) -40 mg per tablet, Take 1 tablet by mouth every 4 (four) hours as needed for headaches, Disp: 30 tablet, Rfl: 1    Cholecalciferol (VITAMIN D3) 1000 units CAPS, Take by mouth, Disp: , Rfl:     coenzyme Q-10 100 MG capsule, Take 100 mg by mouth as needed, Disp: , Rfl:     dexamethasone (DECADRON) 2 mg tablet, Take 1 tablet (2 mg total) by mouth daily as needed (migraine), Disp: 5 tablet, Rfl: 0    Glucosamine-Chondroit-Vit C-Mn (GLUCOSAMINE 1500 COMPLEX PO), Take 2 tablets by mouth daily, Disp: , Rfl:     levothyroxine 25 mcg tablet, TAKE 1 TABLET DAILY, Disp: 90 tablet, Rfl: 4    lisinopril (ZESTRIL) 10 mg tablet, Take 1 tablet (10 mg total) by mouth daily, Disp: 90 tablet, Rfl: 3    magnesium gluconate (MAGONATE) 500 mg tablet, Take 500 mg by mouth 2 (two) times a day , Disp: , Rfl:     POTASSIUM CITRATE ER PO, Take 99 mg by mouth daily, Disp: , Rfl:     prochlorperazine (COMPAZINE) 10 mg tablet, Take 1 tablet (10 mg total) by mouth every 6 (six) hours as needed for nausea or vomiting, Disp: 10 tablet, Rfl: 0    Riboflavin (VITAMIN B-2) 25 MG TABS, Take by mouth, Disp: , Rfl:     rizatriptan (MAXALT-MLT) 10 MG disintegrating tablet, May repeat in 2 hours if needed, Disp: 9 tablet, Rfl: 0    zonisamide (ZONEGRAN) 100 mg capsule, TAKE 1 CAPSULE DAILY AT BEDTIME, Disp: 90 capsule, Rfl: 2    Current Facility-Administered Medications:     prochlorperazine edisylate (COMPAZINE) injection 10 mg, 10 mg, Intramuscular, Q4H PRN, Shima Echols PA-C, 10 mg at 01/26/18 1517    ALLERGIES:  Allergies   Allergen Reactions    Levofloxacin Anaphylaxis and Swelling     Patient reports face swelled, throat was starting to close  REVIEW OF SYSTEMS:  Pertinent items are noted in HPI      LABS:  HgA1c:   Lab Results   Component Value Date    HGBA1C 4 7 10/09/2014     BMP:   Lab Results   Component Value Date    GLUCOSE 91 12/20/2014    CALCIUM 8 8 08/06/2019     12/20/2014    K 3 7 08/06/2019    CO2 21 08/06/2019     08/06/2019    BUN 12 08/06/2019    CREATININE 1 01 08/06/2019           _____________________________________________________  PHYSICAL EXAMINATION:  Vital signs: /88   Pulse 94   Ht 5' 5" (1 651 m)   LMP 11/18/2015   BMI 32 45 kg/m²   General: well developed and well nourished, alert, oriented times 3 and appears comfortable  Psychiatric: Normal  HEENT: Trachea Midline, No torticollis  Cardiovascular: No discernable arrhythmia  Pulmonary: No wheezing or stridor  Skin: No masses, erythema, lacerations, fluctation, ulcerations  Neurovascular: Pulses Intact    MUSCULOSKELETAL EXAMINATION:  RIGHT SIDE:  Cervical Spine:  Spurling's, Lhermitte's and patient states that putting her hand on top of her hand relieves her pain down her arm C5 4-/5, biceps 4-/5, triceps 5/5, wrist extension 4/5, wrist flexion 4+/5, intrinsic 4/5, AIN 5/5, hyperreflexic at her biceps, no brachioradialis reflex 3+, negative hoffmans  _____________________________________________________  STUDIES REVIEWED:  Images were reviewd in PACS: MRI of right wrist on 12/3/19 demonstrates a normal study  Xray of cervical spine on 1/29/2020 demonstrates loss of lordosis with significant foraminal narrowing on her right side         PROCEDURES PERFORMED:  Procedures  No Procedures performed today   Scribe Attestation    I,:   Ilene Yang am acting as a scribe while in the presence of the attending physician :        I,:   Anabelle Anand MD personally performed the services described in this documentation    as scribed in my presence :

## 2020-01-30 ENCOUNTER — TELEPHONE (OUTPATIENT)
Dept: OBGYN CLINIC | Facility: CLINIC | Age: 50
End: 2020-01-30

## 2020-01-30 NOTE — TELEPHONE ENCOUNTER
Dr Haydee Manning from [employer] [facility] is requesting a work status  Letter for Frankiegeorgia Butler to be faxed to [phone] reflecting her appt at 1/29/20  Any questions please call her at [phone]    Thank you

## 2020-01-30 NOTE — TELEPHONE ENCOUNTER
I have no formal restrictions for this patient with regards to her wrist as her pain for her hand is coming from her neck  However, she does have significant restrictions in a letter that was given by Dr Blake Cueto in 2 days ago on her appointment on January 28, 2020  A new work note was provided

## 2020-02-03 ENCOUNTER — OFFICE VISIT (OUTPATIENT)
Dept: FAMILY MEDICINE CLINIC | Facility: CLINIC | Age: 50
End: 2020-02-03
Payer: COMMERCIAL

## 2020-02-03 VITALS
WEIGHT: 199.6 LBS | OXYGEN SATURATION: 98 % | DIASTOLIC BLOOD PRESSURE: 80 MMHG | SYSTOLIC BLOOD PRESSURE: 118 MMHG | HEART RATE: 92 BPM | BODY MASS INDEX: 33.26 KG/M2 | TEMPERATURE: 97.6 F | HEIGHT: 65 IN

## 2020-02-03 DIAGNOSIS — J01.10 ACUTE NON-RECURRENT FRONTAL SINUSITIS: Primary | ICD-10-CM

## 2020-02-03 PROCEDURE — 99213 OFFICE O/P EST LOW 20 MIN: CPT | Performed by: FAMILY MEDICINE

## 2020-02-03 PROCEDURE — 1036F TOBACCO NON-USER: CPT | Performed by: FAMILY MEDICINE

## 2020-02-03 PROCEDURE — 3079F DIAST BP 80-89 MM HG: CPT | Performed by: FAMILY MEDICINE

## 2020-02-03 PROCEDURE — 3074F SYST BP LT 130 MM HG: CPT | Performed by: FAMILY MEDICINE

## 2020-02-03 PROCEDURE — 3008F BODY MASS INDEX DOCD: CPT | Performed by: FAMILY MEDICINE

## 2020-02-03 RX ORDER — PREDNISONE 10 MG/1
TABLET ORAL
Qty: 9 TABLET | Refills: 0 | Status: SHIPPED | OUTPATIENT
Start: 2020-02-03 | End: 2020-02-09

## 2020-02-03 RX ORDER — AMOXICILLIN AND CLAVULANATE POTASSIUM 875; 125 MG/1; MG/1
1 TABLET, FILM COATED ORAL EVERY 12 HOURS SCHEDULED
Qty: 14 TABLET | Refills: 0 | Status: SHIPPED | OUTPATIENT
Start: 2020-02-03 | End: 2020-02-10

## 2020-02-03 NOTE — LETTER
February 3, 2020     Patient: Daisy Barlow   YOB: 1970   Date of Visit: 2/3/2020       To Whom It May Concern: It is my medical opinion that Roshan Navarro {Work release (duty restriction):11096}  If you have any questions or concerns, please don't hesitate to call           Sincerely,        Blanca Chiu MD    CC: No Recipients

## 2020-02-03 NOTE — PROGRESS NOTES
Phuong Valle 1970 female MRN: [id number]    Acute Visit    Assessment/Plan   Hipolito Talavera was seen today for cough, headache and sinus pressure  Diagnoses and all orders for this visit:    Acute non-recurrent frontal sinusitis  -     amoxicillin-clavulanate (AUGMENTIN) 875-125 mg per tablet; Take 1 tablet by mouth every 12 (twelve) hours for 7 days  -     predniSONE 10 mg tablet; Take 2 tablets (20 mg total) by mouth daily for 3 days, THEN 1 tablet (10 mg total) daily for 3 days  Counseled the patient regarding supportive care  They are to call or return to the office if not improving  John Wolf MD  [address]  2/3/2020      Please be aware that this note contains text that was dictated and there may be errors pertaining to "sound-alike "words during the dictation process  SUBJECTIVE    CC: Cough (for about 3 weeks); Headache (since Saturday); and sinus pressure    HPI:  Phuong Valle is a 54 y o  female who presented for an acute visit complaining of 3 weeks dry cough, worse with laying down  She started with headache and worsening sinus pressure over the weekend and is getting worse  Feels tired and rundown  Denies fevers, chills, nausea, diarrhea  hasnt tried anything OTC  Review of Systems   Constitutional: Positive for fatigue  Negative for activity change, chills and fever  HENT: Positive for congestion, postnasal drip, sinus pressure and sinus pain  Negative for ear pain, rhinorrhea and sore throat  Eyes: Negative for visual disturbance  Respiratory: Positive for cough  Negative for shortness of breath and wheezing  Cardiovascular: Negative for chest pain and palpitations  Gastrointestinal: Negative for diarrhea and nausea  Genitourinary: Negative for dysuria  Musculoskeletal: Negative for arthralgias and myalgias  Skin: Negative for rash  Neurological: Positive for headaches  Negative for dizziness and weakness     All other systems reviewed and are negative  Medications:   Meds/Allergies   Current Outpatient Medications   Medication Sig Dispense Refill    ALPRAZolam (XANAX) 0 25 mg tablet TAKE 1 TABLET(0 25 MG) BY MOUTH THREE TIMES DAILY AS NEEDED FOR ANXIETY 60 tablet 3    amitriptyline (ELAVIL) 50 mg tablet Take 1 tablet (50 mg total) by mouth daily at bedtime 90 tablet 2    aspirin 81 MG tablet Take 81 mg by mouth daily   Botulinum Toxin Type A 200 units SOLR INJECT UP  UNITS ONCE EVERY 3 MONTHS AS DIRECTED  IM INTO HEAD AND NECK AREA  200 Units 3    butalbital-acetaminophen-caffeine (FIORICET,ESGIC) -40 mg per tablet Take 1 tablet by mouth every 4 (four) hours as needed for headaches 30 tablet 1    Cholecalciferol (VITAMIN D3) 1000 units CAPS Take by mouth      coenzyme Q-10 100 MG capsule Take 100 mg by mouth as needed      dexamethasone (DECADRON) 2 mg tablet Take 1 tablet (2 mg total) by mouth daily as needed (migraine) 5 tablet 0    Glucosamine-Chondroit-Vit C-Mn (GLUCOSAMINE 1500 COMPLEX PO) Take 2 tablets by mouth daily      levothyroxine 25 mcg tablet TAKE 1 TABLET DAILY 90 tablet 4    lisinopril (ZESTRIL) 10 mg tablet Take 1 tablet (10 mg total) by mouth daily 90 tablet 3    magnesium gluconate (MAGONATE) 500 mg tablet Take 500 mg by mouth 2 (two) times a day        POTASSIUM CITRATE ER PO Take 99 mg by mouth daily      prochlorperazine (COMPAZINE) 10 mg tablet Take 1 tablet (10 mg total) by mouth every 6 (six) hours as needed for nausea or vomiting 10 tablet 0    Riboflavin (VITAMIN B-2) 25 MG TABS Take by mouth      rizatriptan (MAXALT-MLT) 10 MG disintegrating tablet May repeat in 2 hours if needed 9 tablet 0    zonisamide (ZONEGRAN) 100 mg capsule TAKE 1 CAPSULE DAILY AT BEDTIME 90 capsule 2    amoxicillin-clavulanate (AUGMENTIN) 875-125 mg per tablet Take 1 tablet by mouth every 12 (twelve) hours for 7 days 14 tablet 0    predniSONE 10 mg tablet Take 2 tablets (20 mg total) by mouth daily for 3 days, THEN 1 tablet (10 mg total) daily for 3 days  9 tablet 0     Current Facility-Administered Medications   Medication Dose Route Frequency Provider Last Rate Last Dose    prochlorperazine edisylate (COMPAZINE) injection 10 mg  10 mg Intramuscular Q4H PRN Pam Salazar PA-C   10 mg at 01/26/18 1517       Allergies   Allergen Reactions    Levofloxacin Anaphylaxis and Swelling     Patient reports face swelled, throat was starting to close  OBJECTIVE    Vitals:   Vitals:    02/03/20 1524   BP: 118/80   BP Location: Left arm   Patient Position: Sitting   Cuff Size: Adult   Pulse: 92   Temp: 97 6 °F (36 4 °C)   TempSrc: Tympanic   SpO2: 98%   Weight: 90 5 kg (199 lb 9 6 oz)   Height: 5' 5" (1 651 m)     Physical Exam   Constitutional: Vital signs are normal  She appears well-developed and well-nourished  Non-toxic appearance  She appears ill  No distress  HENT:   Head: Normocephalic and atraumatic  Right Ear: External ear normal  A middle ear effusion is present  Left Ear: External ear normal  A middle ear effusion is present  Nose: Mucosal edema and rhinorrhea present  Right sinus exhibits frontal sinus tenderness  Left sinus exhibits frontal sinus tenderness  Mouth/Throat: Uvula is midline, oropharynx is clear and moist and mucous membranes are normal  No oropharyngeal exudate (cobblestoning) or posterior oropharyngeal erythema  No tonsillar exudate  Eyes: Conjunctivae, EOM and lids are normal    Neck: No JVD present  No tracheal deviation present  Thyroid mass and thyromegaly present  Cardiovascular: Intact distal pulses  Pulmonary/Chest: Effort normal  No accessory muscle usage  No respiratory distress  She has no decreased breath sounds  She has no wheezes  She has no rhonchi  She has no rales  Abdominal: Normal appearance  Neurological: She is alert  Skin: No rash noted  She is not diaphoretic  Psychiatric: She has a normal mood and affect     Nursing note and vitals reviewed

## 2020-02-05 ENCOUNTER — OFFICE VISIT (OUTPATIENT)
Dept: OBGYN CLINIC | Facility: CLINIC | Age: 50
End: 2020-02-05
Payer: OTHER MISCELLANEOUS

## 2020-02-05 VITALS
DIASTOLIC BLOOD PRESSURE: 84 MMHG | WEIGHT: 199 LBS | BODY MASS INDEX: 33.15 KG/M2 | HEART RATE: 93 BPM | HEIGHT: 65 IN | SYSTOLIC BLOOD PRESSURE: 125 MMHG

## 2020-02-05 DIAGNOSIS — M54.12 CERVICAL RADICULOPATHY: ICD-10-CM

## 2020-02-05 DIAGNOSIS — M50.30 DDD (DEGENERATIVE DISC DISEASE), CERVICAL: Primary | ICD-10-CM

## 2020-02-05 PROCEDURE — 99243 OFF/OP CNSLTJ NEW/EST LOW 30: CPT | Performed by: ORTHOPAEDIC SURGERY

## 2020-02-05 RX ORDER — GABAPENTIN 300 MG/1
300 CAPSULE ORAL 2 TIMES DAILY
Qty: 60 CAPSULE | Refills: 0 | Status: SHIPPED | OUTPATIENT
Start: 2020-02-05 | End: 2020-04-16 | Stop reason: ALTCHOICE

## 2020-02-05 NOTE — PROGRESS NOTES
Assessment:    Cervical degenerative disc disease resulting in loss of lordosis  Cervical radiculopathy  Right upper extremity weakness (4-/5)      Plan:    Obtain MRI of the cervical spine without contrast  Continue physical therapy  Initiate gabapentin BID  Follow-up 1-2 weeks for imaging review  Further treatment plan will be discussed at that time  Problem List Items Addressed This Visit        Musculoskeletal and Integument    DDD (degenerative disc disease), cervical - Primary    Relevant Medications    gabapentin (NEURONTIN) 300 mg capsule    Other Relevant Orders    MRI cervical spine wo contrast      Other Visit Diagnoses     Cervical radiculopathy        Relevant Medications    gabapentin (NEURONTIN) 300 mg capsule    Other Relevant Orders    MRI cervical spine wo contrast            Subjective:      Patient ID: Karan Bravo is a 54 y o  female  HPI     The patient is a 73-year-old female presents for initial evaluation with Dr Trenton Vieyra  She was referred by Dr Shmuel Gardner for evaluation of cervical radiculopathy  According to the patient, she had a work related injury where she fell in October of 2019 and injured her right upper extremity  Since that time, she has been experiencing right-sided neck pain that radiates to her right elbow and hand and into her fingers  This is associated with numbness and tingling all her fingertips  She also is experiencing subjective weakness of the entire right upper extremity  She states that extension of her neck makes the problem worse  Oral medications have made problem slightly better  She does have difficulty gripping objects  No gait instability  She denies any history of cervical spine surgery in the past   Of note, the patient was seen Dr Shmuel Gardner for right ring finger tenosynovitis, right ECU tendonitis  She also has seen Dr Sam Gray for right shoulder rotator cuff strain            The following portions of the patient's history were reviewed and updated as appropriate: allergies, current medications, past family history, past medical history, past social history, past surgical history and problem list     Review of Systems   Constitutional: Positive for activity change  Negative for chills, diaphoresis, fatigue and fever  Respiratory: Negative  Cardiovascular: Negative  Gastrointestinal: Negative  Genitourinary: Negative for decreased urine volume and difficulty urinating  Musculoskeletal: Positive for arthralgias, neck pain and neck stiffness  Negative for gait problem  Skin: Negative  Neurological: Positive for weakness and numbness  Psychiatric/Behavioral: Negative  All other systems reviewed and are negative  Objective:      /84   Pulse 93   Ht 5' 5" (1 651 m)   Wt 90 3 kg (199 lb)   LMP 11/18/2015   BMI 33 12 kg/m²     Imaging:  X-rays of the cervical spine demonstrate multi-level DDD,  Worse at C4-C7, loss of lordosis, foraminal narrowing  Physical Exam   Constitutional: She is oriented to person, place, and time  She appears well-developed and well-nourished  No distress  HENT:   Head: Normocephalic and atraumatic  Eyes: Right eye exhibits no discharge  Cardiovascular: Intact distal pulses  Pulmonary/Chest: Effort normal    Abdominal: Soft  Musculoskeletal: She exhibits tenderness  Neurological: She is alert and oriented to person, place, and time  Skin: Skin is warm and dry  Capillary refill takes less than 2 seconds  She is not diaphoretic  Psychiatric: She has a normal mood and affect  Nursing note and vitals reviewed  No acute distress  Gait is normal   Inspection no open wounds or erythema  Cervical range-of-motion is normal   There is pain with extension of the cervical spine  Negative Spurling's  Negative Epstein's bilaterally  No sustained clonus, no hyperreflexia  Strength is 4-/5 C5-T1 on the right, 5/5 C5-T1 on the left    Sensation is altered median radial ulnar nerve distribution on the right compared to the left  Hypoactive reflexes at C5, C6, C7 bilaterally

## 2020-02-06 ENCOUNTER — TELEPHONE (OUTPATIENT)
Dept: OBGYN CLINIC | Facility: HOSPITAL | Age: 50
End: 2020-02-06

## 2020-02-06 NOTE — TELEPHONE ENCOUNTER
Yadira Laura, [employer]   [phone]    Yadira Laura is asking for the patient's ovn & work status  I did fax the ovn but there isn't a work note & Yadira Laura says she needs one  Please fax work note to fax [phone]   Yadira Laura also asked for the [facility] script, I faxed that as well

## 2020-03-02 ENCOUNTER — TELEPHONE (OUTPATIENT)
Dept: OBGYN CLINIC | Facility: HOSPITAL | Age: 50
End: 2020-03-02

## 2020-03-02 DIAGNOSIS — M25.511 ACUTE PAIN OF RIGHT SHOULDER: Primary | ICD-10-CM

## 2020-03-02 NOTE — TELEPHONE ENCOUNTER
Patient sees Dr Landen Guillory at [facility] is calling in stating that they need an updated PT script for the right shoulder that needs to be dated back from 1/29/2020  The one that was provided is for cervical since the Workers comp is not covering it for that diagnosis  She is asking if this can be updated as soon as possible and faxed over to the office      Fax# [phone]  Call back if needed# [phone]

## 2020-03-03 ENCOUNTER — OFFICE VISIT (OUTPATIENT)
Dept: OBGYN CLINIC | Facility: CLINIC | Age: 50
End: 2020-03-03
Payer: OTHER MISCELLANEOUS

## 2020-03-03 VITALS
WEIGHT: 198 LBS | BODY MASS INDEX: 32.99 KG/M2 | DIASTOLIC BLOOD PRESSURE: 86 MMHG | HEIGHT: 65 IN | HEART RATE: 85 BPM | SYSTOLIC BLOOD PRESSURE: 123 MMHG

## 2020-03-03 DIAGNOSIS — S46.011A ROTATOR CUFF STRAIN, RIGHT, INITIAL ENCOUNTER: Primary | ICD-10-CM

## 2020-03-03 PROCEDURE — 1036F TOBACCO NON-USER: CPT | Performed by: ORTHOPAEDIC SURGERY

## 2020-03-03 PROCEDURE — 3074F SYST BP LT 130 MM HG: CPT | Performed by: ORTHOPAEDIC SURGERY

## 2020-03-03 PROCEDURE — 99213 OFFICE O/P EST LOW 20 MIN: CPT | Performed by: ORTHOPAEDIC SURGERY

## 2020-03-03 PROCEDURE — 20610 DRAIN/INJ JOINT/BURSA W/O US: CPT | Performed by: ORTHOPAEDIC SURGERY

## 2020-03-03 PROCEDURE — 3079F DIAST BP 80-89 MM HG: CPT | Performed by: ORTHOPAEDIC SURGERY

## 2020-03-03 PROCEDURE — 3008F BODY MASS INDEX DOCD: CPT | Performed by: ORTHOPAEDIC SURGERY

## 2020-03-03 RX ORDER — LIDOCAINE HYDROCHLORIDE 10 MG/ML
4 INJECTION, SOLUTION INFILTRATION; PERINEURAL
Status: COMPLETED | OUTPATIENT
Start: 2020-03-03 | End: 2020-03-03

## 2020-03-03 RX ORDER — METHYLPREDNISOLONE ACETATE 40 MG/ML
1 INJECTION, SUSPENSION INTRA-ARTICULAR; INTRALESIONAL; INTRAMUSCULAR; SOFT TISSUE
Status: COMPLETED | OUTPATIENT
Start: 2020-03-03 | End: 2020-03-03

## 2020-03-03 RX ADMIN — METHYLPREDNISOLONE ACETATE 1 ML: 40 INJECTION, SUSPENSION INTRA-ARTICULAR; INTRALESIONAL; INTRAMUSCULAR; SOFT TISSUE at 15:45

## 2020-03-03 RX ADMIN — LIDOCAINE HYDROCHLORIDE 4 ML: 10 INJECTION, SOLUTION INFILTRATION; PERINEURAL at 15:45

## 2020-03-03 NOTE — PROGRESS NOTES
Patient Name:  Shayan Reese  MRN:  [id number]    [address] [place] [address]     Right Shoulder Rotator Cuff Strain from work injury on 10/18/19  1  Steroid injection performed today into the right shoulder subacromial space  2  Referred for additional PT at patient request   3  Activities as tolerated with modification to avoid pain  4  Follow-up in four weeks for repeat evaluation  I anticipate maximum medical improvement by late April/early May  Subjective     68-year-old female returns to the office today for follow-up regarding her right shoulder  She does note overall improvement  She still notes persistent pain in the right shoulder localized to the lateral aspect  She states worker's comp has stopped her physical therapy and she is trying to do home exercises  She denies any weakness or instability  No numbness or tingling  No fevers or chills  General ROS:  Negative for fever or chills  Neurological ROS:  Negative for numbness or tingling  Objective     /86   Pulse 85   Ht 5' 5" (1 651 m)   Wt 89 8 kg (198 lb)   LMP 11/18/2015   BMI 32 95 kg/m²     Right shoulder:  No gross deformity  No tenderness to palpation  Passive range of motion includes 160° of forward flexion, 100° of external rotation-abduction, 70° of internal rotation-abduction  Positive Neer impingement test   Positive Frank impingement test   Pain but no weakness with empty can test   Negative speed's test   Mildly positive cross-body adduction test   Sensation intact axillary, median, ulnar and radial nerves  2+ radial pulse      Large joint arthrocentesis: R subacromial bursa  Date/Time: 3/3/2020 3:45 PM  Procedure Details  Location: shoulder - R subacromial bursa  Needle size: 22 G  Ultrasound guidance: no  Approach: lateral  Medications administered: 4 mL lidocaine 1 %; 1 mL methylPREDNISolone acetate 40 mg/mL    Patient tolerance: patient tolerated the procedure well with no immediate complications  Dressing: Sterile dressing applied            Social History     Tobacco Use    Smoking status: Never Smoker    Smokeless tobacco: Never Used   Substance Use Topics    Alcohol use: No     Comment: per [employer]-drinks socially    Drug use: No       Scribe Attestation    I,:   Neel Rai PA-C am acting as a scribe while in the presence of the attending physician :        I,:   Demian Tate MD personally performed the services described in this documentation    as scribed in my presence :

## 2020-03-04 ENCOUNTER — TELEPHONE (OUTPATIENT)
Dept: OBGYN CLINIC | Facility: HOSPITAL | Age: 50
End: 2020-03-04

## 2020-03-04 NOTE — TELEPHONE ENCOUNTER
I have received paperwork asking for the patients work status  On 1/28/2020 there was a work note place stating she has limitations of 10 lbs  Max lifiting and no repetitive use of the RUE  Does this patient have any work restrictions as of the 3/3/2020 visit? If brooke, can you place a work note stating?     Please advise

## 2020-03-04 NOTE — TELEPHONE ENCOUNTER
Patient Dr Sam Rangel from [employer] called  CB# [phone]   Fax# [phone] [facility] ID# [facility] 2019 [id number]     Updated work status letter faxed

## 2020-03-06 ENCOUNTER — TELEPHONE (OUTPATIENT)
Dept: OBGYN CLINIC | Facility: HOSPITAL | Age: 50
End: 2020-03-06

## 2020-03-06 NOTE — TELEPHONE ENCOUNTER
Patient sees Dr Gokul Sloan at [facility] is calling in asking to get a PT order faxed over to the office       Fax #  [phone]

## 2020-03-19 ENCOUNTER — TELEPHONE (OUTPATIENT)
Dept: NEUROLOGY | Facility: CLINIC | Age: 50
End: 2020-03-19

## 2020-03-19 NOTE — TELEPHONE ENCOUNTER
Patient is scheduled with Lincoln Carrasquillo on 4/14/2020 in the [facility] location

## 2020-03-19 NOTE — TELEPHONE ENCOUNTER
Called [facility]- spoke with Carleen Hogue- I advised her I was calling to initiate a refill request for the patient's Botox prescription  Refill request initiated  Will do a status check in 2 days

## 2020-03-19 NOTE — TELEPHONE ENCOUNTER
Type Date User Summary Attachment   General 03/16/2020  2:44 PM Oralia Phillips care coordination  -   Note    Botox- authorization #: ([employer]): [id number]- valid from 9/13/2019 until 9/12/2020   Please use [facility]      Thank you,     Janell Copeland

## 2020-03-23 NOTE — TELEPHONE ENCOUNTER
Called [facility] and spoke to Terri Duque - she informed me that the order is in [employer] for verification, order has been marked as STAT  Sukhi Gudanay Trejo

## 2020-03-25 NOTE — TELEPHONE ENCOUNTER
[facility] EMERGENCY DEPT 
[address] [place] [id number] 
[phone] Work/School Note Date: 2/21/2017 To Whom It May concern: 
 
Jose Maria Nava was seen and treated today in the emergency room by the following provider(s): 
Attending Provider: Homar Fischer MD. Jose Maria Nava may return to work on 2/23/17.  
 
Sincerely, 
 
 
 
 
Michael Mcallister RN 
 
 
 
 Called and spoke with [facility]- she is going to pull this order to her and get this expedited for me  Will do a status check tomorrow

## 2020-03-25 NOTE — TELEPHONE ENCOUNTER
[address]- spoke with Igor Woodward- she states the patient's Botox order is currently in insurance verification  Another STAT e-mail has been sent to expedite this order  Will contact Del Rucker to see if she can assist with this order

## 2020-03-26 NOTE — TELEPHONE ENCOUNTER
receieved a voicemail from the patient returning my call  Patient requesting a call back  Called and spoke with the patient- she is aware that her Botox order is ready to be scheduled for delivery  I advised her she must call [facility] to provide consent to ship  Patient states she will take care of this right after we hang up  I also provided the patient with the phone number for [facility]  Will call after lunch to coordinate delivery

## 2020-03-26 NOTE — TELEPHONE ENCOUNTER
[address]- spoke with Ranjeet Camacho- she states the patient's Botox order is ready to be scheduled for delivery  Patient's consent is not on file  Patient does have a co-pay she must take care of  I attempted to contact the patient while I had [facility] on the phone- patient did not answer  lmom awaiting a call back- when patient calls back please advise her that her Botox order is ready to be scheduled for delivery  Patient must call [address] as soon as possible by Monday in order to get the Botox delivered on time  Please provide her with the phone number listed below for [facility]        [facility]: [phone]

## 2020-03-26 NOTE — TELEPHONE ENCOUNTER
[address]- spoke with Paula Herrera- I advised her I was calling to set up delivery for the patient's Botox order  She confirmed that the Botox order is ready to be scheduled for delivery  Patient's consent is on file  Botox to be delivered on Tuesday 3/31/2020 to the [facility] location via [facility] overnight- a signature will be required  [facility],    Please await Botox delivery and document once it has arrived  Please let me know if you do not receive the patient's Botox order      Thank you,    Floyd Ludwig

## 2020-03-31 NOTE — TELEPHONE ENCOUNTER
Botox number of units: 200 units   Botox quantity: 1  Arrived at what location: [facility]   Lot number: [id number]

## 2020-04-05 DIAGNOSIS — F41.9 ANXIETY: ICD-10-CM

## 2020-04-06 ENCOUNTER — TELEPHONE (OUTPATIENT)
Dept: OBGYN CLINIC | Facility: CLINIC | Age: 50
End: 2020-04-06

## 2020-04-06 ENCOUNTER — TELEMEDICINE (OUTPATIENT)
Dept: OBGYN CLINIC | Facility: CLINIC | Age: 50
End: 2020-04-06
Payer: OTHER MISCELLANEOUS

## 2020-04-06 DIAGNOSIS — S46.011D STRAIN OF TENDON OF RIGHT ROTATOR CUFF, SUBSEQUENT ENCOUNTER: Primary | ICD-10-CM

## 2020-04-06 PROBLEM — S46.011A STRAIN OF TENDON OF RIGHT ROTATOR CUFF: Status: ACTIVE | Noted: 2019-10-18

## 2020-04-06 PROBLEM — S46.011A STRAIN OF TENDON OF RIGHT ROTATOR CUFF: Status: ACTIVE | Noted: 2020-04-06

## 2020-04-06 PROCEDURE — 99212 OFFICE O/P EST SF 10 MIN: CPT | Performed by: ORTHOPAEDIC SURGERY

## 2020-04-06 RX ORDER — ALPRAZOLAM 0.25 MG/1
TABLET ORAL
Qty: 60 TABLET | Refills: 3 | Status: SHIPPED | OUTPATIENT
Start: 2020-04-06 | End: 2020-09-14 | Stop reason: ALTCHOICE

## 2020-04-07 ENCOUNTER — TELEPHONE (OUTPATIENT)
Dept: OBGYN CLINIC | Facility: HOSPITAL | Age: 50
End: 2020-04-07

## 2020-04-14 ENCOUNTER — PROCEDURE VISIT (OUTPATIENT)
Dept: NEUROLOGY | Facility: CLINIC | Age: 50
End: 2020-04-14
Payer: COMMERCIAL

## 2020-04-14 VITALS — SYSTOLIC BLOOD PRESSURE: 146 MMHG | DIASTOLIC BLOOD PRESSURE: 110 MMHG | HEART RATE: 80 BPM | TEMPERATURE: 98.7 F

## 2020-04-14 DIAGNOSIS — G43.709 CHRONIC MIGRAINE WITHOUT AURA WITHOUT STATUS MIGRAINOSUS, NOT INTRACTABLE: Primary | ICD-10-CM

## 2020-04-14 PROCEDURE — 64615 CHEMODENERV MUSC MIGRAINE: CPT | Performed by: PHYSICIAN ASSISTANT

## 2020-04-15 ENCOUNTER — TELEPHONE (OUTPATIENT)
Dept: FAMILY MEDICINE CLINIC | Facility: CLINIC | Age: 50
End: 2020-04-15

## 2020-04-16 ENCOUNTER — OFFICE VISIT (OUTPATIENT)
Dept: FAMILY MEDICINE CLINIC | Facility: CLINIC | Age: 50
End: 2020-04-16
Payer: COMMERCIAL

## 2020-04-16 VITALS
BODY MASS INDEX: 33.32 KG/M2 | WEIGHT: 200 LBS | TEMPERATURE: 99.1 F | DIASTOLIC BLOOD PRESSURE: 92 MMHG | HEIGHT: 65 IN | HEART RATE: 80 BPM | SYSTOLIC BLOOD PRESSURE: 124 MMHG

## 2020-04-16 DIAGNOSIS — Z00.00 ANNUAL PHYSICAL EXAM: Primary | ICD-10-CM

## 2020-04-16 DIAGNOSIS — I10 ESSENTIAL HYPERTENSION: ICD-10-CM

## 2020-04-16 DIAGNOSIS — F41.9 ANXIETY: ICD-10-CM

## 2020-04-16 PROCEDURE — 3008F BODY MASS INDEX DOCD: CPT | Performed by: FAMILY MEDICINE

## 2020-04-16 PROCEDURE — 3074F SYST BP LT 130 MM HG: CPT | Performed by: FAMILY MEDICINE

## 2020-04-16 PROCEDURE — 3080F DIAST BP >= 90 MM HG: CPT | Performed by: FAMILY MEDICINE

## 2020-04-16 PROCEDURE — 1036F TOBACCO NON-USER: CPT | Performed by: FAMILY MEDICINE

## 2020-04-16 PROCEDURE — 99214 OFFICE O/P EST MOD 30 MIN: CPT | Performed by: FAMILY MEDICINE

## 2020-04-16 RX ORDER — ESCITALOPRAM OXALATE 10 MG/1
10 TABLET ORAL DAILY
Qty: 30 TABLET | Refills: 5 | Status: SHIPPED | OUTPATIENT
Start: 2020-04-16 | End: 2020-08-17 | Stop reason: SDUPTHER

## 2020-04-16 RX ORDER — LISINOPRIL 10 MG/1
20 TABLET ORAL DAILY
Qty: 90 TABLET | Refills: 3
Start: 2020-04-16 | End: 2020-04-30 | Stop reason: SDUPTHER

## 2020-04-23 ENCOUNTER — TELEPHONE (OUTPATIENT)
Dept: NEUROLOGY | Facility: CLINIC | Age: 50
End: 2020-04-23

## 2020-04-29 ENCOUNTER — TELEMEDICINE (OUTPATIENT)
Dept: NEUROLOGY | Facility: CLINIC | Age: 50
End: 2020-04-29
Payer: COMMERCIAL

## 2020-04-29 DIAGNOSIS — G43.709 CHRONIC MIGRAINE WITHOUT AURA WITHOUT STATUS MIGRAINOSUS, NOT INTRACTABLE: Primary | ICD-10-CM

## 2020-04-29 DIAGNOSIS — G43.719 INTRACTABLE CHRONIC MIGRAINE WITHOUT AURA AND WITHOUT STATUS MIGRAINOSUS: ICD-10-CM

## 2020-04-29 PROCEDURE — 99214 OFFICE O/P EST MOD 30 MIN: CPT | Performed by: PHYSICIAN ASSISTANT

## 2020-04-29 RX ORDER — DEXAMETHASONE 2 MG/1
2 TABLET ORAL DAILY PRN
Qty: 5 TABLET | Refills: 0 | Status: SHIPPED | OUTPATIENT
Start: 2020-04-29 | End: 2021-02-05

## 2020-04-29 RX ORDER — RIZATRIPTAN BENZOATE 10 MG/1
TABLET, ORALLY DISINTEGRATING ORAL
Qty: 9 TABLET | Refills: 0 | Status: SHIPPED | OUTPATIENT
Start: 2020-04-29 | End: 2021-01-22 | Stop reason: SDUPTHER

## 2020-04-29 RX ORDER — PROCHLORPERAZINE MALEATE 10 MG
10 TABLET ORAL EVERY 6 HOURS PRN
Qty: 10 TABLET | Refills: 0 | Status: SHIPPED | OUTPATIENT
Start: 2020-04-29 | End: 2021-01-22 | Stop reason: SDUPTHER

## 2020-04-30 ENCOUNTER — CLINICAL SUPPORT (OUTPATIENT)
Dept: FAMILY MEDICINE CLINIC | Facility: CLINIC | Age: 50
End: 2020-04-30
Payer: COMMERCIAL

## 2020-04-30 VITALS
WEIGHT: 199 LBS | TEMPERATURE: 98.2 F | HEART RATE: 76 BPM | BODY MASS INDEX: 33.15 KG/M2 | DIASTOLIC BLOOD PRESSURE: 84 MMHG | HEIGHT: 65 IN | SYSTOLIC BLOOD PRESSURE: 124 MMHG

## 2020-04-30 DIAGNOSIS — I10 ESSENTIAL HYPERTENSION: Primary | ICD-10-CM

## 2020-04-30 DIAGNOSIS — I10 ESSENTIAL HYPERTENSION: ICD-10-CM

## 2020-04-30 PROCEDURE — 3074F SYST BP LT 130 MM HG: CPT

## 2020-04-30 PROCEDURE — 3079F DIAST BP 80-89 MM HG: CPT

## 2020-04-30 PROCEDURE — 3008F BODY MASS INDEX DOCD: CPT

## 2020-04-30 PROCEDURE — 99211 OFF/OP EST MAY X REQ PHY/QHP: CPT

## 2020-04-30 RX ORDER — LISINOPRIL 20 MG/1
20 TABLET ORAL DAILY
Qty: 90 TABLET | Refills: 1 | Status: SHIPPED | OUTPATIENT
Start: 2020-04-30 | End: 2020-08-28 | Stop reason: ALTCHOICE

## 2020-05-11 ENCOUNTER — TELEPHONE (OUTPATIENT)
Dept: FAMILY MEDICINE CLINIC | Facility: CLINIC | Age: 50
End: 2020-05-11

## 2020-05-11 DIAGNOSIS — E78.00 HYPERCHOLESTEROLEMIA: ICD-10-CM

## 2020-05-11 DIAGNOSIS — E03.9 HYPOTHYROIDISM, UNSPECIFIED TYPE: ICD-10-CM

## 2020-05-11 DIAGNOSIS — I10 ESSENTIAL HYPERTENSION: Primary | ICD-10-CM

## 2020-05-12 ENCOUNTER — APPOINTMENT (OUTPATIENT)
Dept: LAB | Facility: CLINIC | Age: 50
End: 2020-05-12
Payer: COMMERCIAL

## 2020-05-12 ENCOUNTER — TRANSCRIBE ORDERS (OUTPATIENT)
Dept: LAB | Facility: CLINIC | Age: 50
End: 2020-05-12

## 2020-05-12 DIAGNOSIS — E78.00 HYPERCHOLESTEROLEMIA: ICD-10-CM

## 2020-05-12 DIAGNOSIS — I10 ESSENTIAL HYPERTENSION: ICD-10-CM

## 2020-05-12 DIAGNOSIS — E03.9 HYPOTHYROIDISM, UNSPECIFIED TYPE: ICD-10-CM

## 2020-05-12 LAB
ALBUMIN SERPL BCP-MCNC: 3.8 G/DL (ref 3.5–5)
ALP SERPL-CCNC: 72 U/L (ref 46–116)
ALT SERPL W P-5'-P-CCNC: 44 U/L (ref 12–78)
ANION GAP SERPL CALCULATED.3IONS-SCNC: 9 MMOL/L (ref 4–13)
AST SERPL W P-5'-P-CCNC: 26 U/L (ref 5–45)
BACTERIA UR QL AUTO: ABNORMAL /HPF
BILIRUB SERPL-MCNC: 0.37 MG/DL (ref 0.2–1)
BILIRUB UR QL STRIP: NEGATIVE
BUN SERPL-MCNC: 11 MG/DL (ref 5–25)
CALCIUM SERPL-MCNC: 8.7 MG/DL (ref 8.3–10.1)
CHLORIDE SERPL-SCNC: 105 MMOL/L (ref 100–108)
CHOLEST SERPL-MCNC: 177 MG/DL (ref 50–200)
CLARITY UR: CLEAR
CO2 SERPL-SCNC: 24 MMOL/L (ref 21–32)
COLOR UR: ABNORMAL
CREAT SERPL-MCNC: 1.13 MG/DL (ref 0.6–1.3)
CREAT UR-MCNC: 207 MG/DL
ERYTHROCYTE [DISTWIDTH] IN BLOOD BY AUTOMATED COUNT: 12.6 % (ref 11.6–15.1)
EST. AVERAGE GLUCOSE BLD GHB EST-MCNC: 91 MG/DL
GFR SERPL CREATININE-BSD FRML MDRD: 58 ML/MIN/1.73SQ M
GLUCOSE P FAST SERPL-MCNC: 91 MG/DL (ref 65–99)
GLUCOSE UR STRIP-MCNC: NEGATIVE MG/DL
HBA1C MFR BLD: 4.8 %
HCT VFR BLD AUTO: 44.5 % (ref 34.8–46.1)
HDLC SERPL-MCNC: 35 MG/DL
HGB BLD-MCNC: 15.1 G/DL (ref 11.5–15.4)
HGB UR QL STRIP.AUTO: ABNORMAL
KETONES UR STRIP-MCNC: NEGATIVE MG/DL
LDLC SERPL CALC-MCNC: 111 MG/DL (ref 0–100)
LEUKOCYTE ESTERASE UR QL STRIP: NEGATIVE
MCH RBC QN AUTO: 31.1 PG (ref 26.8–34.3)
MCHC RBC AUTO-ENTMCNC: 33.9 G/DL (ref 31.4–37.4)
MCV RBC AUTO: 92 FL (ref 82–98)
MICROALBUMIN UR-MCNC: 12.6 MG/L (ref 0–20)
MICROALBUMIN/CREAT 24H UR: 6 MG/G CREATININE (ref 0–30)
MUCOUS THREADS UR QL AUTO: ABNORMAL
NITRITE UR QL STRIP: NEGATIVE
NON-SQ EPI CELLS URNS QL MICRO: ABNORMAL /HPF
PH UR STRIP.AUTO: 6.5 [PH]
PLATELET # BLD AUTO: 185 THOUSANDS/UL (ref 149–390)
PMV BLD AUTO: 10 FL (ref 8.9–12.7)
POTASSIUM SERPL-SCNC: 3.9 MMOL/L (ref 3.5–5.3)
PROT SERPL-MCNC: 6.8 G/DL (ref 6.4–8.2)
PROT UR STRIP-MCNC: NEGATIVE MG/DL
RBC # BLD AUTO: 4.85 MILLION/UL (ref 3.81–5.12)
RBC #/AREA URNS AUTO: ABNORMAL /HPF
SODIUM SERPL-SCNC: 138 MMOL/L (ref 136–145)
SP GR UR STRIP.AUTO: 1.02 (ref 1–1.03)
TRIGL SERPL-MCNC: 155 MG/DL
TSH SERPL DL<=0.05 MIU/L-ACNC: 1.7 UIU/ML (ref 0.36–3.74)
UROBILINOGEN UR QL STRIP.AUTO: 0.2 E.U./DL
WBC # BLD AUTO: 6.6 THOUSAND/UL (ref 4.31–10.16)
WBC #/AREA URNS AUTO: ABNORMAL /HPF

## 2020-05-12 PROCEDURE — 84443 ASSAY THYROID STIM HORMONE: CPT

## 2020-05-12 PROCEDURE — 85027 COMPLETE CBC AUTOMATED: CPT

## 2020-05-12 PROCEDURE — 82043 UR ALBUMIN QUANTITATIVE: CPT

## 2020-05-12 PROCEDURE — 82570 ASSAY OF URINE CREATININE: CPT

## 2020-05-12 PROCEDURE — 81001 URINALYSIS AUTO W/SCOPE: CPT

## 2020-05-12 PROCEDURE — 80053 COMPREHEN METABOLIC PANEL: CPT

## 2020-05-12 PROCEDURE — 36415 COLL VENOUS BLD VENIPUNCTURE: CPT

## 2020-05-12 PROCEDURE — 83036 HEMOGLOBIN GLYCOSYLATED A1C: CPT

## 2020-05-12 PROCEDURE — 80061 LIPID PANEL: CPT

## 2020-05-18 ENCOUNTER — OFFICE VISIT (OUTPATIENT)
Dept: FAMILY MEDICINE CLINIC | Facility: CLINIC | Age: 50
End: 2020-05-18
Payer: COMMERCIAL

## 2020-05-18 VITALS
TEMPERATURE: 99.7 F | SYSTOLIC BLOOD PRESSURE: 122 MMHG | HEART RATE: 74 BPM | WEIGHT: 206 LBS | DIASTOLIC BLOOD PRESSURE: 80 MMHG | BODY MASS INDEX: 34.32 KG/M2 | HEIGHT: 65 IN

## 2020-05-18 DIAGNOSIS — G47.19 EXCESSIVE DAYTIME SLEEPINESS: ICD-10-CM

## 2020-05-18 DIAGNOSIS — G47.00 INSOMNIA, UNSPECIFIED TYPE: ICD-10-CM

## 2020-05-18 DIAGNOSIS — Z00.00 ANNUAL PHYSICAL EXAM: Primary | ICD-10-CM

## 2020-05-18 DIAGNOSIS — R63.8 INCREASED BMI: ICD-10-CM

## 2020-05-18 PROCEDURE — 3074F SYST BP LT 130 MM HG: CPT | Performed by: FAMILY MEDICINE

## 2020-05-18 PROCEDURE — 99214 OFFICE O/P EST MOD 30 MIN: CPT | Performed by: FAMILY MEDICINE

## 2020-05-18 PROCEDURE — 99396 PREV VISIT EST AGE 40-64: CPT | Performed by: FAMILY MEDICINE

## 2020-05-18 PROCEDURE — 3008F BODY MASS INDEX DOCD: CPT | Performed by: FAMILY MEDICINE

## 2020-05-18 PROCEDURE — 1036F TOBACCO NON-USER: CPT | Performed by: FAMILY MEDICINE

## 2020-05-18 PROCEDURE — 3079F DIAST BP 80-89 MM HG: CPT | Performed by: FAMILY MEDICINE

## 2020-05-18 RX ORDER — ZOLPIDEM TARTRATE 12.5 MG/1
12.5 TABLET, FILM COATED, EXTENDED RELEASE ORAL
Qty: 30 TABLET | Refills: 0 | Status: SHIPPED | OUTPATIENT
Start: 2020-05-18 | End: 2020-06-18 | Stop reason: SDUPTHER

## 2020-05-19 ENCOUNTER — OFFICE VISIT (OUTPATIENT)
Dept: OBGYN CLINIC | Facility: CLINIC | Age: 50
End: 2020-05-19
Payer: OTHER MISCELLANEOUS

## 2020-05-19 VITALS
SYSTOLIC BLOOD PRESSURE: 146 MMHG | BODY MASS INDEX: 33.66 KG/M2 | HEART RATE: 96 BPM | HEIGHT: 65 IN | DIASTOLIC BLOOD PRESSURE: 100 MMHG | WEIGHT: 202 LBS

## 2020-05-19 DIAGNOSIS — S46.011D STRAIN OF TENDON OF RIGHT ROTATOR CUFF, SUBSEQUENT ENCOUNTER: Primary | ICD-10-CM

## 2020-05-19 PROCEDURE — 99213 OFFICE O/P EST LOW 20 MIN: CPT | Performed by: ORTHOPAEDIC SURGERY

## 2020-05-19 PROCEDURE — 1036F TOBACCO NON-USER: CPT | Performed by: ORTHOPAEDIC SURGERY

## 2020-05-19 PROCEDURE — 3008F BODY MASS INDEX DOCD: CPT | Performed by: ORTHOPAEDIC SURGERY

## 2020-05-20 ENCOUNTER — TELEPHONE (OUTPATIENT)
Dept: OBGYN CLINIC | Facility: MEDICAL CENTER | Age: 50
End: 2020-05-20

## 2020-05-21 ENCOUNTER — TELEPHONE (OUTPATIENT)
Dept: OBGYN CLINIC | Facility: HOSPITAL | Age: 50
End: 2020-05-21

## 2020-05-22 ENCOUNTER — HOSPITAL ENCOUNTER (OUTPATIENT)
Dept: SLEEP CENTER | Facility: CLINIC | Age: 50
Discharge: HOME | End: 2020-05-22
Payer: COMMERCIAL

## 2020-05-22 DIAGNOSIS — G47.19 EXCESSIVE DAYTIME SLEEPINESS: ICD-10-CM

## 2020-05-22 PROCEDURE — G0399 HOME SLEEP TEST/TYPE 3 PORTA: HCPCS

## 2020-05-26 ENCOUNTER — TELEPHONE (OUTPATIENT)
Dept: BARIATRICS | Facility: CLINIC | Age: 50
End: 2020-05-26

## 2020-05-26 ENCOUNTER — TELEPHONE (OUTPATIENT)
Dept: SLEEP CENTER | Facility: CLINIC | Age: 50
End: 2020-05-26

## 2020-05-27 ENCOUNTER — OFFICE VISIT (OUTPATIENT)
Dept: BARIATRICS | Facility: CLINIC | Age: 50
End: 2020-05-27
Payer: COMMERCIAL

## 2020-05-27 VITALS
HEIGHT: 65 IN | RESPIRATION RATE: 17 BRPM | TEMPERATURE: 100.3 F | WEIGHT: 203.4 LBS | HEART RATE: 93 BPM | DIASTOLIC BLOOD PRESSURE: 68 MMHG | SYSTOLIC BLOOD PRESSURE: 122 MMHG | BODY MASS INDEX: 33.89 KG/M2

## 2020-05-27 DIAGNOSIS — E03.9 HYPOTHYROIDISM: ICD-10-CM

## 2020-05-27 DIAGNOSIS — R63.5 ABNORMAL WEIGHT GAIN: Primary | ICD-10-CM

## 2020-05-27 DIAGNOSIS — I10 ESSENTIAL HYPERTENSION: ICD-10-CM

## 2020-05-27 DIAGNOSIS — E66.9 CLASS 1 OBESITY: ICD-10-CM

## 2020-05-27 DIAGNOSIS — G47.30 SLEEP APNEA, UNSPECIFIED TYPE: Primary | ICD-10-CM

## 2020-05-27 DIAGNOSIS — R63.8 INCREASED BMI: ICD-10-CM

## 2020-05-27 DIAGNOSIS — G43.709 CHRONIC MIGRAINE WITHOUT AURA WITHOUT STATUS MIGRAINOSUS, NOT INTRACTABLE: ICD-10-CM

## 2020-05-27 PROBLEM — E66.811 CLASS 1 OBESITY: Status: ACTIVE | Noted: 2020-05-27

## 2020-05-27 PROBLEM — F41.9 ANXIETY: Status: ACTIVE | Noted: 2020-05-27

## 2020-05-27 PROCEDURE — 99244 OFF/OP CNSLTJ NEW/EST MOD 40: CPT | Performed by: PHYSICIAN ASSISTANT

## 2020-06-18 DIAGNOSIS — G47.00 INSOMNIA, UNSPECIFIED TYPE: ICD-10-CM

## 2020-06-18 RX ORDER — ZOLPIDEM TARTRATE 12.5 MG/1
12.5 TABLET, FILM COATED, EXTENDED RELEASE ORAL
Qty: 30 TABLET | Refills: 0 | Status: SHIPPED | OUTPATIENT
Start: 2020-06-18 | End: 2020-07-15 | Stop reason: SDUPTHER

## 2020-06-29 ENCOUNTER — TELEPHONE (OUTPATIENT)
Dept: NEUROLOGY | Facility: CLINIC | Age: 50
End: 2020-06-29

## 2020-07-15 DIAGNOSIS — G47.00 INSOMNIA, UNSPECIFIED TYPE: ICD-10-CM

## 2020-07-15 RX ORDER — ZOLPIDEM TARTRATE 12.5 MG/1
12.5 TABLET, FILM COATED, EXTENDED RELEASE ORAL
Qty: 30 TABLET | Refills: 0 | Status: SHIPPED | OUTPATIENT
Start: 2020-07-17 | End: 2020-08-14 | Stop reason: SDUPTHER

## 2020-07-15 NOTE — TELEPHONE ENCOUNTER
Per PDMP last fill 06/18/20   Last OV 05/18/20, Next OV 11/03/20 06/18/2020 1 06/18/2020 ZOLPIDEM TART ER 12 5 MG TAB 30 0 30 CA BRO [id number] [facility] ([id number]) 0 [employer] Ins [place]

## 2020-07-17 ENCOUNTER — TELEPHONE (OUTPATIENT)
Dept: FAMILY MEDICINE CLINIC | Facility: CLINIC | Age: 50
End: 2020-07-17

## 2020-07-17 NOTE — TELEPHONE ENCOUNTER
Patient called stating she needs a TB vaccine before starting college in the fall  She was wondering if she needs to get a TB vaccine?

## 2020-07-20 ENCOUNTER — PROCEDURE VISIT (OUTPATIENT)
Dept: NEUROLOGY | Facility: CLINIC | Age: 50
End: 2020-07-20
Payer: COMMERCIAL

## 2020-07-20 VITALS — SYSTOLIC BLOOD PRESSURE: 125 MMHG | DIASTOLIC BLOOD PRESSURE: 83 MMHG | HEART RATE: 97 BPM | TEMPERATURE: 98.4 F

## 2020-07-20 DIAGNOSIS — G43.709 CHRONIC MIGRAINE WITHOUT AURA WITHOUT STATUS MIGRAINOSUS, NOT INTRACTABLE: Primary | ICD-10-CM

## 2020-07-20 PROCEDURE — 64615 CHEMODENERV MUSC MIGRAINE: CPT | Performed by: PHYSICIAN ASSISTANT

## 2020-07-20 NOTE — PROGRESS NOTES
Chemodenervation  Date/Time: 7/20/2020 1:56 PM  Performed by: Veto Wise PA-C  Authorized by: Veto Wise PA-C     Pre-procedure details:     Prepped With: Alcohol    Anesthesia (see MAR for exact dosages): Anesthesia method:  None  Procedure details:     Position:  Upright  Botox:     Botox Type:  Type A    Brand:  Botox    mL's of Botulinum Toxin:  200    Final Concentration per CC:  200 units    Needle Gauge:  30 G 2 5 inch  Procedures:     Botox Procedures: chronic headache      Indications: migraines    Injection Location:     Head / Face:  L superior cervical paraspinal, R superior cervical paraspinal, L , R , L frontalis, R frontalis, L medial occipitalis, R medial occipitalis, procerus, R temporalis, L temporalis, R superior trapezius and L superior trapezius    L  injection amount:  5 unit(s)    R  injection amount:  5 unit(s)    L lateral frontalis:  5 unit(s)    R lateral frontalis:  5 unit(s)    L medial frontalis:  5 unit(s)    R medial frontalis:  5 unit(s)    L temporalis injection amount:  20 unit(s)    R temporalis injection amount:  20 unit(s)    Procerus injection amount:  5 unit(s)    L medial occipitalis injection amount:  15 unit(s)    R medial occipitalis injection amount:  15 unit(s)    L superior cervical paraspinal injection amount:  10 unit(s)    R superior cervical paraspinal injection amount:  10 unit(s)    L superior trapezius injection amount:  15 unit(s)    R superior trapezius injection amount:  15 unit(s)  Total Units:     Total units used:  200    Total units discarded:  0  Post-procedure details:     Chemodenervation:  Chronic migraine    Facial Nerve Location[de-identified]  Bilateral facial nerve    Patient tolerance of procedure:   Tolerated well, no immediate complications  Comments:       10 units occipitalis  35 units frontalis  All medically necessary

## 2020-08-14 DIAGNOSIS — G47.00 INSOMNIA, UNSPECIFIED TYPE: ICD-10-CM

## 2020-08-14 RX ORDER — ZOLPIDEM TARTRATE 12.5 MG/1
12.5 TABLET, FILM COATED, EXTENDED RELEASE ORAL
Qty: 30 TABLET | Refills: 0 | Status: SHIPPED | OUTPATIENT
Start: 2020-08-14 | End: 2020-09-15 | Stop reason: SDUPTHER

## 2020-08-14 NOTE — TELEPHONE ENCOUNTER
Per PDMP last fill 07/15/20  Last OV 05/18/20, Next OV 09/21/20    07/15/2020 1 07/15/2020 ZOLPIDEM TART ER 12 5 MG TAB 30 0 30 CA BRO [id number] [facility] ([id number]) 0 [employer] [place]

## 2020-08-17 ENCOUNTER — OFFICE VISIT (OUTPATIENT)
Dept: FAMILY MEDICINE CLINIC | Facility: CLINIC | Age: 50
End: 2020-08-17
Payer: COMMERCIAL

## 2020-08-17 VITALS
HEART RATE: 78 BPM | TEMPERATURE: 99.4 F | BODY MASS INDEX: 32.15 KG/M2 | DIASTOLIC BLOOD PRESSURE: 82 MMHG | WEIGHT: 193 LBS | SYSTOLIC BLOOD PRESSURE: 130 MMHG | HEIGHT: 65 IN

## 2020-08-17 DIAGNOSIS — F41.9 ANXIETY: ICD-10-CM

## 2020-08-17 DIAGNOSIS — R00.2 PALPITATIONS: Primary | ICD-10-CM

## 2020-08-17 DIAGNOSIS — M77.31 HEEL SPUR, RIGHT: ICD-10-CM

## 2020-08-17 PROCEDURE — 3079F DIAST BP 80-89 MM HG: CPT | Performed by: FAMILY MEDICINE

## 2020-08-17 PROCEDURE — 93000 ELECTROCARDIOGRAM COMPLETE: CPT | Performed by: FAMILY MEDICINE

## 2020-08-17 PROCEDURE — 99214 OFFICE O/P EST MOD 30 MIN: CPT | Performed by: FAMILY MEDICINE

## 2020-08-17 PROCEDURE — 3725F SCREEN DEPRESSION PERFORMED: CPT | Performed by: FAMILY MEDICINE

## 2020-08-17 PROCEDURE — 1036F TOBACCO NON-USER: CPT | Performed by: FAMILY MEDICINE

## 2020-08-17 PROCEDURE — 3008F BODY MASS INDEX DOCD: CPT | Performed by: FAMILY MEDICINE

## 2020-08-17 PROCEDURE — 3075F SYST BP GE 130 - 139MM HG: CPT | Performed by: FAMILY MEDICINE

## 2020-08-17 RX ORDER — MELOXICAM 15 MG/1
15 TABLET ORAL DAILY
Qty: 30 TABLET | Refills: 5 | Status: SHIPPED | OUTPATIENT
Start: 2020-08-17 | End: 2020-09-14 | Stop reason: ALTCHOICE

## 2020-08-17 RX ORDER — ESCITALOPRAM OXALATE 20 MG/1
20 TABLET ORAL DAILY
Qty: 30 TABLET | Refills: 3 | Status: SHIPPED | OUTPATIENT
Start: 2020-08-17 | End: 2020-12-29

## 2020-08-17 NOTE — PROGRESS NOTES
BMI Counseling: Body mass index is 31 87 kg/m²  The BMI is above normal  Nutrition recommendations include reducing portion sizes  Patient ID: Marichuy Cordoba is a 54 y o  female  HPI: 54 y  o female presenting with a chief complaint of anxiety, increased stress on a daily basis; + mood swings, but no dysphoria, anhedonia or suicidality  When she feels anixous, she experiences palpitations without chest pain, dyspnea, or dizziness  Pt also has right heel pain when she walks for a period of time   ; she denies any pain across bottom of foot        SUBJECTIVE    Family History   Problem Relation Age of Onset    Hypertension Mother    [place] Other Mother         Bladder surgery    Obesity Mother     Diabetes Father     Hypertension Father     Heart disease Father     Obesity Father     Leukemia Sister     Obesity Sister     No Known Problems Maternal Aunt     No Known Problems Maternal Aunt     Cancer Maternal Grandmother         Bladder    Dementia Maternal Grandfather     Leukemia Paternal Grandmother     Stroke Neg Hx      Social History     Socioeconomic History    Marital status: /Civil Union     Spouse name: Not on file    Number of children: Not on file    Years of education: Not on file    Highest education level: Not on file   Occupational History    Not on file   Social Needs    Financial resource strain: Not on file    Food insecurity     Worry: Not on file     Inability: Not on file   to be needs     Medical: Not on file     Non-medical: Not on file   Tobacco Use    Smoking status: Never Smoker    Smokeless tobacco: Never Used   Substance and Sexual Activity    Alcohol use: No     Comment: per [employer]-drinks socially    Drug use: No    Sexual activity: Not on file   Lifestyle    Physical activity     Days per week: Not on file     Minutes per session: Not on file    Stress: Not on file   Relationships    Social connections     Talks on phone: Not on file Gets together: Not on file     Attends Voodoo service: Not on file     Active member of club or organization: Not on file     Attends meetings of clubs or organizations: Not on file     Relationship status: Not on file    Intimate partner violence     Fear of current or ex partner: Not on file     Emotionally abused: Not on file     Physically abused: Not on file     Forced sexual activity: Not on file   Other Topics Concern    Not on file   Social History Narrative    Denied coffee, cola, tea consumption per [employer]     Past Medical History:   Diagnosis Date    Cervicalgia     disc displacement,radiculopathy    Depression     Disease of thyroid gland     Hypertension     last assessed: 8/2/2016    Migraine     Myofascial pain syndrome     Obesity (BMI 30 0-34  9)      Past Surgical History:   Procedure Laterality Date    BREAST BIOPSY Right     biospy w/ marker    CARPAL TUNNEL RELEASE Right     GANGLION CYST EXCISION Left 8/3/2017    Procedure: WRIST/HAND MASS EXCISION;  Surgeon: Jaycee Espinoza MD;  Location:  MAIN OR;  Service: Orthopedics    HYSTERECTOMY       Allergies   Allergen Reactions    Levofloxacin Anaphylaxis and Swelling     Patient reports face swelled, throat was starting to close  Current Outpatient Medications:     ALPRAZolam (XANAX) 0 25 mg tablet, TAKE 1 TABLET(0 25 MG) BY MOUTH THREE TIMES DAILY AS NEEDED FOR ANXIETY, Disp: 60 tablet, Rfl: 3    amitriptyline (ELAVIL) 50 mg tablet, Take 1 tablet (50 mg total) by mouth daily at bedtime, Disp: 90 tablet, Rfl: 2    aspirin 81 MG tablet, Take 81 mg by mouth daily  , Disp: , Rfl:     Botulinum Toxin Type A 200 units SOLR, INJECT UP  UNITS ONCE EVERY 3 MONTHS AS DIRECTED   IM INTO HEAD AND NECK AREA , Disp: 200 Units, Rfl: 3    butalbital-acetaminophen-caffeine (FIORICET,ESGIC) -40 mg per tablet, Take 1 tablet by mouth every 4 (four) hours as needed for headaches, Disp: 30 tablet, Rfl: 1   Cholecalciferol (VITAMIN D3) 1000 units CAPS, Take by mouth, Disp: , Rfl:     coenzyme Q-10 100 MG capsule, Take 100 mg by mouth as needed, Disp: , Rfl:     dexamethasone (DECADRON) 2 mg tablet, Take 1 tablet (2 mg total) by mouth daily as needed (migraine), Disp: 5 tablet, Rfl: 0    escitalopram (LEXAPRO) 20 mg tablet, Take 1 tablet (20 mg total) by mouth daily, Disp: 30 tablet, Rfl: 3    levothyroxine 25 mcg tablet, TAKE 1 TABLET DAILY, Disp: 90 tablet, Rfl: 4    lisinopril (ZESTRIL) 20 mg tablet, Take 1 tablet (20 mg total) by mouth daily, Disp: 90 tablet, Rfl: 1    magnesium gluconate (MAGONATE) 500 mg tablet, Take 500 mg by mouth 2 (two) times a day , Disp: , Rfl:     POTASSIUM CITRATE ER PO, Take 99 mg by mouth daily, Disp: , Rfl:     prochlorperazine (COMPAZINE) 10 mg tablet, Take 1 tablet (10 mg total) by mouth every 6 (six) hours as needed for nausea or vomiting, Disp: 10 tablet, Rfl: 0    Riboflavin (VITAMIN B-2) 25 MG TABS, Take by mouth, Disp: , Rfl:     rizatriptan (MAXALT-MLT) 10 MG disintegrating tablet, May repeat in 2 hours if needed, Disp: 9 tablet, Rfl: 0    zolpidem (AMBIEN CR) 12 5 MG CR tablet, Take 1 tablet (12 5 mg total) by mouth daily at bedtime as needed for sleep, Disp: 30 tablet, Rfl: 0    zonisamide (ZONEGRAN) 100 mg capsule, TAKE 1 CAPSULE DAILY AT BEDTIME, Disp: 90 capsule, Rfl: 2    meloxicam (MOBIC) 15 mg tablet, Take 1 tablet (15 mg total) by mouth daily, Disp: 30 tablet, Rfl: 5    Current Facility-Administered Medications:     prochlorperazine edisylate (COMPAZINE) injection 10 mg, 10 mg, Intramuscular, Q4H PRN, Robin Seay PA-C, 10 mg at 01/26/18 1517    Review of Systems  Constitutional:     Denies fever, chills ,fatigue ,weakness ,weight loss, weight gain     ENT: Denies earache ,loss of hearing ,nosebleed, nasal discharge,nasal congestion ,sore throat ,hoarseness  Pulmonary: Denies shortness of breath ,cough  ,dyspnea on exertion, orthopnea  ,PND Cardiovascular:  Denies bradycardia , tachycardia  lower extremity edema leg, claudication+ palpitations  Breast:  Denies new or changing breast lumps ,nipple discharge ,nipple changes  Abdomen:  Denies abdominal pain , anorexia , indigestion, nausea, vomiting, constipation, diarrhea  Musculoskeletal: Denies myalgias, arthralgias, joint swelling, joint stiffness , limb pain, limb swelling+ right heel pain  Gu: denies dysuria, polyuria  Skin: Denies skin rash, skin lesion, skin wound, itching, dry skin  Neuro: Denies headache, numbness, tingling, confusion, loss of consciousness, dizziness, vertigo  Psychiatric: Denies feelings of depression, suicidal ideation,  sleep disturbances+ anxiety     OBJECTIVE  /82   Pulse 78   Temp 99 4 °F (37 4 °C)   Ht 5' 5 25" (1 657 m)   Wt 87 5 kg (193 lb)   LMP 11/18/2015   BMI 31 87 kg/m²   Constitutional:   NAD, well appearing and well nourished      ENT:   Conjunctiva and lids: no injection, edema, or discharge     Pupils and iris: CHELSEA bilaterally    External inspection of ears and nose: normal without deformities or discharge  Otoscopic exam: Canals patent without erythema  Nasal mucosa, septum and turbinates: Normal or edema or discharge         Oropharynx:  Moist mucosa, normal tongue and tonsils without lesions  No erythema        Pulmonary:Respiratory effort normal rate and rhythm, no increased work of breathing   Auscultation of lungs:  Clear bilaterally with no adventitious breath sounds       Cardiovascular: regular rate and rhythm, S1 and S2, no murmur, no edema and/or varicosities of LE      Abdomen: Soft and non-distended     Positive bowel sounds      No heptomegaly or splenomegaly      Gu: no suprapubic tenderness or CVA tenderness, no urethral discharge  Lymphatic:  No anterior or posterior cervical lymphadenopathy         Musculoskeletal:  Gait and station: Normal gait      Digits and nails normal without clubbing or cyanosis Inspection/palpation of joints, bones, and muscles:  + right heel tenderness,no  swelling, full active and passive range of motion  Of right foot  Skin: Normal skin turgor and no rashes      Neuro:      Normal reflexes      Psych:   alert and oriented to person, place and time     normal mood and affect       Assessment/Plan:Diagnoses and all orders for this visit:    Palpitations  -     POCT ECG    Anxiety  Comments: Will reevaluate lexapro effectiveness after 4 weeks and continue prn xanax in meantime  Orders:  -     escitalopram (LEXAPRO) 20 mg tablet; Take 1 tablet (20 mg total) by mouth daily    Heel spur, right  -     meloxicam (MOBIC) 15 mg tablet; Take 1 tablet (15 mg total) by mouth daily        Reviewed with patient plan to treat with above plan      Patient instructed to call in 72 hours if not feeling better or if symptoms worsen

## 2020-08-25 DIAGNOSIS — G43.709 CHRONIC MIGRAINE WITHOUT AURA WITHOUT STATUS MIGRAINOSUS, NOT INTRACTABLE: ICD-10-CM

## 2020-08-25 RX ORDER — AMITRIPTYLINE HYDROCHLORIDE 50 MG/1
TABLET, FILM COATED ORAL
Qty: 90 TABLET | Refills: 3 | Status: SHIPPED | OUTPATIENT
Start: 2020-08-25 | End: 2021-06-17

## 2020-08-27 ENCOUNTER — TELEPHONE (OUTPATIENT)
Dept: NEUROLOGY | Facility: CLINIC | Age: 50
End: 2020-08-27

## 2020-08-27 NOTE — TELEPHONE ENCOUNTER
Botox authorization submitted to [employer] via [employer] mir[employer] on 8/27/2020  Will await approval/denial letter      Pending authorization #: [id number]

## 2020-08-28 DIAGNOSIS — I10 ESSENTIAL HYPERTENSION: Primary | ICD-10-CM

## 2020-08-28 RX ORDER — AMLODIPINE BESYLATE 5 MG/1
5 TABLET ORAL DAILY
Qty: 30 TABLET | Refills: 5 | Status: SHIPPED | OUTPATIENT
Start: 2020-08-28 | End: 2020-11-14 | Stop reason: SDUPTHER

## 2020-08-28 NOTE — TELEPHONE ENCOUNTER
Received an approval from [employer] via [employer]  Approval information printed & scanned into the patient's chart under "media" for future reference      Authorization information:    Authorization #: [id number]   Valid dates: 10/1/2020 until 9/30/2021  Please use [facility]

## 2020-09-08 DIAGNOSIS — G43.809 OTHER MIGRAINE WITHOUT STATUS MIGRAINOSUS, NOT INTRACTABLE: ICD-10-CM

## 2020-09-09 RX ORDER — BUTALBITAL, ACETAMINOPHEN AND CAFFEINE 50; 325; 40 MG/1; MG/1; MG/1
1 TABLET ORAL EVERY 4 HOURS PRN
Qty: 30 TABLET | Refills: 0 | Status: SHIPPED | OUTPATIENT
Start: 2020-09-09 | End: 2021-01-27 | Stop reason: SDUPTHER

## 2020-09-14 ENCOUNTER — CLINICAL SUPPORT (OUTPATIENT)
Dept: FAMILY MEDICINE CLINIC | Facility: CLINIC | Age: 50
End: 2020-09-14
Payer: COMMERCIAL

## 2020-09-14 VITALS
HEART RATE: 82 BPM | TEMPERATURE: 98.5 F | SYSTOLIC BLOOD PRESSURE: 122 MMHG | DIASTOLIC BLOOD PRESSURE: 82 MMHG | OXYGEN SATURATION: 97 % | BODY MASS INDEX: 32.15 KG/M2 | WEIGHT: 193 LBS | HEIGHT: 65 IN

## 2020-09-14 DIAGNOSIS — I10 ESSENTIAL HYPERTENSION: Primary | ICD-10-CM

## 2020-09-14 PROCEDURE — 1036F TOBACCO NON-USER: CPT | Performed by: FAMILY MEDICINE

## 2020-09-14 PROCEDURE — 99211 OFF/OP EST MAY X REQ PHY/QHP: CPT | Performed by: FAMILY MEDICINE

## 2020-09-14 NOTE — PROGRESS NOTES
Patient is here today for a blood pressure check, she was taken off lisinopril due to a cough she started to experience and was put on amlodipine 5mg  Patient states she has no side effects with this medication and the cough is starting to go away  Blood pressure in office today was 122/82  Per Dr Isak Jones, this is great and she is to call with any questions or concerns

## 2020-09-15 DIAGNOSIS — G47.00 INSOMNIA, UNSPECIFIED TYPE: ICD-10-CM

## 2020-09-15 RX ORDER — ZOLPIDEM TARTRATE 12.5 MG/1
12.5 TABLET, FILM COATED, EXTENDED RELEASE ORAL
Qty: 30 TABLET | Refills: 0 | Status: SHIPPED | OUTPATIENT
Start: 2020-09-15 | End: 2020-10-14 | Stop reason: SDUPTHER

## 2020-09-21 ENCOUNTER — TELEPHONE (OUTPATIENT)
Dept: NEUROLOGY | Facility: CLINIC | Age: 50
End: 2020-09-21

## 2020-09-21 NOTE — TELEPHONE ENCOUNTER
Type  Date  User  Summary  Attachment    General  09/21/2020 12:07 PM  Richard Fernandes  care coordination  -    Note     UPDATE:     Botox- authorization #: [id number]- 2nd visit- valid from 10/1/2020 until 9/30/2021   Please use [facility]      Thank you,     Frederick Beauchamp

## 2020-09-24 DIAGNOSIS — G43.719 INTRACTABLE CHRONIC MIGRAINE WITHOUT AURA AND WITHOUT STATUS MIGRAINOSUS: ICD-10-CM

## 2020-09-24 RX ORDER — ZONISAMIDE 100 MG/1
CAPSULE ORAL
Qty: 90 CAPSULE | Refills: 3 | Status: SHIPPED | OUTPATIENT
Start: 2020-09-24 | End: 2021-06-17 | Stop reason: ALTCHOICE

## 2020-09-30 NOTE — TELEPHONE ENCOUNTER
[address] Stanton- spoke with Patric Levy- I advised her that I was calling to initiate a refill on the patient's Botox prescription  Delivery pre-scheduled for 10/14/2020 to the [address] [place] [address] location  She states the a new Rx will need to be called in  She was able to transfer me to a pharmacist to provide a new verbal Rx  Spoke with Lilian Valerio, pharmacist- provided a verbal Rx for Botox 200 units QTY: 1 under [facility] Chronic Migraine with 3 refills  Will do a status check in 2 days

## 2020-10-02 NOTE — TELEPHONE ENCOUNTER
75 Virgil Mejia- spoke with Herlinda Jones- she states the patient's Botox order is in insurance verification  Will do a status check in 2 days

## 2020-10-03 DIAGNOSIS — F41.9 ANXIETY: ICD-10-CM

## 2020-10-05 RX ORDER — ESCITALOPRAM OXALATE 10 MG/1
TABLET ORAL
Qty: 30 TABLET | Refills: 5 | Status: SHIPPED | OUTPATIENT
Start: 2020-10-05 | End: 2020-11-04 | Stop reason: ALTCHOICE

## 2020-10-06 NOTE — TELEPHONE ENCOUNTER
75 Virgil Mejia- spoke with Dixie Rivera- he states the patient's Botox order is in insurance verification  Will do a status check in 2 days

## 2020-10-08 NOTE — TELEPHONE ENCOUNTER
[address] Vir[address] Mejia- spoke with Matilda Gutierrez- she states the patient's Botox order is ready to be scheduled for delivery  Patient's consent is on file  Botox to be delivered on Friday 10/9/2020 to the [facility] location via [facility] overnight- a signature will be required  Gerardo Helm,    Please await the patient's Botox order and document once it has arrived  Please let me know if you do not receive the patient's Botox order      Thank you,    Alvarez Solares

## 2020-10-08 NOTE — TELEPHONE ENCOUNTER
Georgina Elder,     Please await delivery for tomorrow as I am out of office      Thank you,     Darlene Bajwa

## 2020-10-09 NOTE — TELEPHONE ENCOUNTER
Botox number of units: 200  Botox quantity: 1  Arrived at what location: [address] [place] [address] Date: 06/2023

## 2020-10-14 DIAGNOSIS — G47.00 INSOMNIA, UNSPECIFIED TYPE: ICD-10-CM

## 2020-10-15 RX ORDER — ZOLPIDEM TARTRATE 12.5 MG/1
12.5 TABLET, FILM COATED, EXTENDED RELEASE ORAL
Qty: 30 TABLET | Refills: 0 | Status: SHIPPED | OUTPATIENT
Start: 2020-10-15 | End: 2020-11-12 | Stop reason: SDUPTHER

## 2020-10-20 ENCOUNTER — PROCEDURE VISIT (OUTPATIENT)
Dept: NEUROLOGY | Facility: CLINIC | Age: 50
End: 2020-10-20
Payer: COMMERCIAL

## 2020-10-20 VITALS — HEART RATE: 84 BPM | SYSTOLIC BLOOD PRESSURE: 129 MMHG | DIASTOLIC BLOOD PRESSURE: 91 MMHG | TEMPERATURE: 97.7 F

## 2020-10-20 DIAGNOSIS — G43.709 CHRONIC MIGRAINE WITHOUT AURA WITHOUT STATUS MIGRAINOSUS, NOT INTRACTABLE: Primary | ICD-10-CM

## 2020-10-20 PROCEDURE — 64615 CHEMODENERV MUSC MIGRAINE: CPT | Performed by: PHYSICIAN ASSISTANT

## 2020-11-04 ENCOUNTER — OFFICE VISIT (OUTPATIENT)
Dept: NEUROLOGY | Facility: CLINIC | Age: 50
End: 2020-11-04
Payer: COMMERCIAL

## 2020-11-04 VITALS
HEIGHT: 65 IN | SYSTOLIC BLOOD PRESSURE: 125 MMHG | WEIGHT: 197.2 LBS | RESPIRATION RATE: 16 BRPM | DIASTOLIC BLOOD PRESSURE: 88 MMHG | HEART RATE: 80 BPM | BODY MASS INDEX: 32.86 KG/M2 | TEMPERATURE: 96.9 F

## 2020-11-04 DIAGNOSIS — G43.709 CHRONIC MIGRAINE WITHOUT AURA WITHOUT STATUS MIGRAINOSUS, NOT INTRACTABLE: Primary | ICD-10-CM

## 2020-11-04 PROCEDURE — 3074F SYST BP LT 130 MM HG: CPT | Performed by: PHYSICIAN ASSISTANT

## 2020-11-04 PROCEDURE — 3008F BODY MASS INDEX DOCD: CPT | Performed by: FAMILY MEDICINE

## 2020-11-04 PROCEDURE — 99213 OFFICE O/P EST LOW 20 MIN: CPT | Performed by: PHYSICIAN ASSISTANT

## 2020-11-04 PROCEDURE — 3079F DIAST BP 80-89 MM HG: CPT | Performed by: PHYSICIAN ASSISTANT

## 2020-11-12 DIAGNOSIS — G47.00 INSOMNIA, UNSPECIFIED TYPE: ICD-10-CM

## 2020-11-12 RX ORDER — ZOLPIDEM TARTRATE 12.5 MG/1
12.5 TABLET, FILM COATED, EXTENDED RELEASE ORAL
Qty: 30 TABLET | Refills: 0 | Status: SHIPPED | OUTPATIENT
Start: 2020-11-12 | End: 2020-12-13 | Stop reason: SDUPTHER

## 2020-11-14 DIAGNOSIS — I10 ESSENTIAL HYPERTENSION: ICD-10-CM

## 2020-11-16 RX ORDER — AMLODIPINE BESYLATE 5 MG/1
5 TABLET ORAL DAILY
Qty: 30 TABLET | Refills: 0 | Status: SHIPPED | OUTPATIENT
Start: 2020-11-16 | End: 2020-12-14 | Stop reason: SDUPTHER

## 2020-11-18 ENCOUNTER — TELEMEDICINE (OUTPATIENT)
Dept: FAMILY MEDICINE CLINIC | Facility: CLINIC | Age: 50
End: 2020-11-18

## 2020-11-18 DIAGNOSIS — Z20.822 EXPOSURE TO COVID-19 VIRUS: Primary | ICD-10-CM

## 2020-11-18 DIAGNOSIS — Z20.822 EXPOSURE TO COVID-19 VIRUS: ICD-10-CM

## 2020-11-18 PROCEDURE — U0003 INFECTIOUS AGENT DETECTION BY NUCLEIC ACID (DNA OR RNA); SEVERE ACUTE RESPIRATORY SYNDROME CORONAVIRUS 2 (SARS-COV-2) (CORONAVIRUS DISEASE [COVID-19]), AMPLIFIED PROBE TECHNIQUE, MAKING USE OF HIGH THROUGHPUT TECHNOLOGIES AS DESCRIBED BY CMS-2020-01-R: HCPCS | Performed by: FAMILY MEDICINE

## 2020-11-18 PROCEDURE — 99213 OFFICE O/P EST LOW 20 MIN: CPT | Performed by: FAMILY MEDICINE

## 2020-11-19 ENCOUNTER — PATIENT MESSAGE (OUTPATIENT)
Dept: FAMILY MEDICINE CLINIC | Facility: CLINIC | Age: 50
End: 2020-11-19

## 2020-11-20 ENCOUNTER — TELEMEDICINE (OUTPATIENT)
Dept: FAMILY MEDICINE CLINIC | Facility: CLINIC | Age: 50
End: 2020-11-20
Payer: COMMERCIAL

## 2020-11-20 DIAGNOSIS — H69.81 DYSFUNCTION OF RIGHT EUSTACHIAN TUBE: Primary | ICD-10-CM

## 2020-11-20 LAB — SARS-COV-2 RNA SPEC QL NAA+PROBE: NOT DETECTED

## 2020-11-20 PROCEDURE — 99213 OFFICE O/P EST LOW 20 MIN: CPT | Performed by: FAMILY MEDICINE

## 2020-11-20 PROCEDURE — 1036F TOBACCO NON-USER: CPT | Performed by: FAMILY MEDICINE

## 2020-11-20 RX ORDER — PREDNISONE 20 MG/1
40 TABLET ORAL DAILY
Qty: 10 TABLET | Refills: 0 | Status: SHIPPED | OUTPATIENT
Start: 2020-11-20 | End: 2020-11-25

## 2020-12-05 ENCOUNTER — HOSPITAL ENCOUNTER (OUTPATIENT)
Dept: MRI IMAGING | Facility: HOSPITAL | Age: 50
Discharge: HOME | End: 2020-12-05
Payer: COMMERCIAL

## 2020-12-05 DIAGNOSIS — M54.12 CERVICAL RADICULOPATHY: ICD-10-CM

## 2020-12-05 DIAGNOSIS — M50.30 DDD (DEGENERATIVE DISC DISEASE), CERVICAL: ICD-10-CM

## 2020-12-05 PROCEDURE — 72141 MRI NECK SPINE W/O DYE: CPT

## 2020-12-13 DIAGNOSIS — G47.00 INSOMNIA, UNSPECIFIED TYPE: ICD-10-CM

## 2020-12-13 DIAGNOSIS — I10 ESSENTIAL HYPERTENSION: ICD-10-CM

## 2020-12-13 RX ORDER — AMLODIPINE BESYLATE 5 MG/1
5 TABLET ORAL DAILY
Qty: 30 TABLET | Refills: 0 | Status: CANCELLED | OUTPATIENT
Start: 2020-12-13

## 2020-12-14 RX ORDER — AMLODIPINE BESYLATE 5 MG/1
5 TABLET ORAL DAILY
Qty: 30 TABLET | Refills: 0 | Status: SHIPPED | OUTPATIENT
Start: 2020-12-14 | End: 2021-01-16 | Stop reason: SDUPTHER

## 2020-12-14 RX ORDER — ZOLPIDEM TARTRATE 12.5 MG/1
12.5 TABLET, FILM COATED, EXTENDED RELEASE ORAL
Qty: 30 TABLET | Refills: 0 | Status: SHIPPED | OUTPATIENT
Start: 2020-12-14 | End: 2021-01-10 | Stop reason: SDUPTHER

## 2020-12-14 NOTE — PROGRESS NOTES
Patient Name:  Rebel العلي  MRN:  [id number]    Karina Merino     Right Shoulder Rotator Cuff Strain caused by work injury on 10/18/19    1  MRI reveals no evidence of a rotator cuff tear  Recommended beginning formal physical therapy and home exercise program for the above-mentioned diagnosis  2  She may return to work on modified duty until next evaluation  Restrictions of max lifting 10 lb occasionally, no work above shoulder level and no repetitive use of the right upper extremity  A note was provided today  3  May take OTC anti-inflammatories and ice p r n  for pain relief  4  Follow-up in 1 month for re-evaluation symptoms  Subjective     55 y/o female who presents today for a follow-up visit for her right shoulder as well as to discuss MRI results  Patient had a work injury when she fell and tripped over a crack in the sidewalk and landed on outstretched right upper extremity on 10/18/2019  Patient notes that since her last visit she has not done any provocative maneuvers with her right upper extremity so the pain has mildly improved  She does state that if she overuses or performs repetitive motions with the right shoulder then her symptoms/pain to increase  She is a  so she is constantly using her upper extremities  General ROS:  Negative for fever or chills  Neurological ROS:  Negative for numbness or tingling  Objective     /88   Pulse 92   Ht 5' 5" (1 651 m)   Wt 87 1 kg (192 lb)   LMP 11/18/2015   BMI 31 95 kg/m²       Counseling     The patient was counseled regarding diagnostic results, impressions, patient/family education, instructions for management, risks and benefits of treatment options, and prognosis  The total time of the encounter was 15 minutes, and more than 50% of that time was spent in counseling and coordination of care      Psychiatric: Mood and affect are appropriate    Data Review     I have personally reviewed pertinent Relayed to patient. One refill sent to pharmacy. Patient states she will call back to schedule f/u appt. films in PACS, and my interpretation follows  MRI right shoulder: The rotator cuff tendons are intact without evidence of full-thickness tear  Tendinosis of the supraspinatus and infraspinatus tendons with increased T2 signal in the infraspinatus suggestive of rotator cuff strain      Social History     Tobacco Use    Smoking status: Never Smoker    Smokeless tobacco: Never Used   Substance Use Topics    Alcohol use: No     Comment: per [employer]-drinks socially    Drug use: No       Scribe Attestation    I,:   Hernando Castañeda am acting as a scribe while in the presence of the attending physician :        I,:   Camille Lu MD personally performed the services described in this documentation    as scribed in my presence :

## 2020-12-15 ENCOUNTER — TELEPHONE (OUTPATIENT)
Dept: NEUROLOGY | Facility: CLINIC | Age: 50
End: 2020-12-15

## 2020-12-29 DIAGNOSIS — F41.9 ANXIETY: ICD-10-CM

## 2020-12-29 RX ORDER — ESCITALOPRAM OXALATE 20 MG/1
TABLET ORAL
Qty: 30 TABLET | Refills: 0 | Status: SHIPPED | OUTPATIENT
Start: 2020-12-29 | End: 2021-01-27 | Stop reason: SDUPTHER

## 2020-12-30 ENCOUNTER — OFFICE VISIT (OUTPATIENT)
Dept: OBGYN CLINIC | Facility: CLINIC | Age: 50
End: 2020-12-30
Payer: COMMERCIAL

## 2020-12-30 VITALS
WEIGHT: 197 LBS | DIASTOLIC BLOOD PRESSURE: 85 MMHG | SYSTOLIC BLOOD PRESSURE: 124 MMHG | HEIGHT: 65 IN | BODY MASS INDEX: 32.82 KG/M2 | HEART RATE: 96 BPM

## 2020-12-30 DIAGNOSIS — M50.30 DDD (DEGENERATIVE DISC DISEASE), CERVICAL: ICD-10-CM

## 2020-12-30 DIAGNOSIS — M54.12 RADICULOPATHY, CERVICAL REGION: Primary | ICD-10-CM

## 2020-12-30 PROCEDURE — 1036F TOBACCO NON-USER: CPT | Performed by: ORTHOPAEDIC SURGERY

## 2020-12-30 PROCEDURE — 99214 OFFICE O/P EST MOD 30 MIN: CPT | Performed by: ORTHOPAEDIC SURGERY

## 2020-12-30 PROCEDURE — 3074F SYST BP LT 130 MM HG: CPT | Performed by: ORTHOPAEDIC SURGERY

## 2020-12-30 PROCEDURE — 3079F DIAST BP 80-89 MM HG: CPT | Performed by: ORTHOPAEDIC SURGERY

## 2020-12-30 PROCEDURE — 3008F BODY MASS INDEX DOCD: CPT | Performed by: ORTHOPAEDIC SURGERY

## 2021-01-06 ENCOUNTER — VBI (OUTPATIENT)
Dept: ADMINISTRATIVE | Facility: OTHER | Age: 51
End: 2021-01-06

## 2021-01-07 ENCOUNTER — TELEPHONE (OUTPATIENT)
Dept: NEUROLOGY | Facility: CLINIC | Age: 51
End: 2021-01-07

## 2021-01-10 DIAGNOSIS — G47.00 INSOMNIA, UNSPECIFIED TYPE: ICD-10-CM

## 2021-01-11 RX ORDER — ZOLPIDEM TARTRATE 12.5 MG/1
12.5 TABLET, FILM COATED, EXTENDED RELEASE ORAL
Qty: 30 TABLET | Refills: 0 | Status: SHIPPED | OUTPATIENT
Start: 2021-01-11 | End: 2021-02-09 | Stop reason: SDUPTHER

## 2021-01-16 DIAGNOSIS — I10 ESSENTIAL HYPERTENSION: ICD-10-CM

## 2021-01-18 RX ORDER — AMLODIPINE BESYLATE 5 MG/1
5 TABLET ORAL DAILY
Qty: 30 TABLET | Refills: 0 | Status: SHIPPED | OUTPATIENT
Start: 2021-01-18 | End: 2021-02-07 | Stop reason: SDUPTHER

## 2021-01-21 ENCOUNTER — PROCEDURE VISIT (OUTPATIENT)
Dept: NEUROLOGY | Facility: CLINIC | Age: 51
End: 2021-01-21
Payer: COMMERCIAL

## 2021-01-21 VITALS — DIASTOLIC BLOOD PRESSURE: 80 MMHG | TEMPERATURE: 97.6 F | HEART RATE: 92 BPM | SYSTOLIC BLOOD PRESSURE: 137 MMHG

## 2021-01-21 DIAGNOSIS — G43.709 CHRONIC MIGRAINE WITHOUT AURA WITHOUT STATUS MIGRAINOSUS, NOT INTRACTABLE: Primary | ICD-10-CM

## 2021-01-21 PROCEDURE — 64615 CHEMODENERV MUSC MIGRAINE: CPT

## 2021-01-21 NOTE — PROGRESS NOTES

## 2021-01-22 DIAGNOSIS — G43.719 INTRACTABLE CHRONIC MIGRAINE WITHOUT AURA AND WITHOUT STATUS MIGRAINOSUS: ICD-10-CM

## 2021-01-22 RX ORDER — PROCHLORPERAZINE MALEATE 10 MG
10 TABLET ORAL EVERY 6 HOURS PRN
Qty: 10 TABLET | Refills: 0 | Status: SHIPPED | OUTPATIENT
Start: 2021-01-22 | End: 2021-06-17 | Stop reason: ALTCHOICE

## 2021-01-22 RX ORDER — RIZATRIPTAN BENZOATE 10 MG/1
TABLET, ORALLY DISINTEGRATING ORAL
Qty: 9 TABLET | Refills: 0 | Status: SHIPPED | OUTPATIENT
Start: 2021-01-22 | End: 2021-06-17 | Stop reason: ALTCHOICE

## 2021-01-27 DIAGNOSIS — F41.9 ANXIETY: ICD-10-CM

## 2021-01-27 DIAGNOSIS — G43.809 OTHER MIGRAINE WITHOUT STATUS MIGRAINOSUS, NOT INTRACTABLE: ICD-10-CM

## 2021-01-27 RX ORDER — ESCITALOPRAM OXALATE 20 MG/1
20 TABLET ORAL DAILY
Qty: 30 TABLET | Refills: 3 | Status: SHIPPED | OUTPATIENT
Start: 2021-01-27 | End: 2021-06-02

## 2021-01-27 RX ORDER — ESCITALOPRAM OXALATE 20 MG/1
20 TABLET ORAL DAILY
Qty: 30 TABLET | Refills: 0 | Status: CANCELLED | OUTPATIENT
Start: 2021-01-27

## 2021-01-27 RX ORDER — BUTALBITAL, ACETAMINOPHEN AND CAFFEINE 50; 325; 40 MG/1; MG/1; MG/1
1 TABLET ORAL EVERY 4 HOURS PRN
Qty: 30 TABLET | Refills: 0 | Status: SHIPPED | OUTPATIENT
Start: 2021-01-27 | End: 2021-07-06 | Stop reason: SDUPTHER

## 2021-02-03 ENCOUNTER — PATIENT MESSAGE (OUTPATIENT)
Dept: NEUROLOGY | Facility: CLINIC | Age: 51
End: 2021-02-03

## 2021-02-04 NOTE — TELEPHONE ENCOUNTER
Call placed to patient  Nothing sooner found with Marguerite  Appt scheduled with Ahandyhand Mouse for 3/9/2021

## 2021-02-05 ENCOUNTER — TRANSCRIBE ORDERS (OUTPATIENT)
Dept: PAIN MEDICINE | Facility: CLINIC | Age: 51
End: 2021-02-05

## 2021-02-05 ENCOUNTER — CONSULT (OUTPATIENT)
Dept: PAIN MEDICINE | Facility: CLINIC | Age: 51
End: 2021-02-05
Payer: COMMERCIAL

## 2021-02-05 VITALS
WEIGHT: 197 LBS | DIASTOLIC BLOOD PRESSURE: 87 MMHG | SYSTOLIC BLOOD PRESSURE: 126 MMHG | BODY MASS INDEX: 32.78 KG/M2 | HEART RATE: 96 BPM

## 2021-02-05 DIAGNOSIS — M47.812 CERVICAL SPONDYLOSIS: ICD-10-CM

## 2021-02-05 DIAGNOSIS — M50.120 CERVICAL DISC DISORDER WITH RADICULOPATHY OF MID-CERVICAL REGION: Primary | ICD-10-CM

## 2021-02-05 PROCEDURE — 99244 OFF/OP CNSLTJ NEW/EST MOD 40: CPT | Performed by: ANESTHESIOLOGY

## 2021-02-05 NOTE — LETTER
February 8, 2021     Hollie Cardenas [facility], [facility] [address] [place] [address]    Patient: Whitney Stapleton   YOB: 1970   Date of Visit: 2/5/2021       Dear Dr Nino Swift: Thank you for referring Antonieta Paula to me for evaluation  Below are my notes for this consultation  If you have questions, please do not hesitate to call me  I look forward to following your patient along with you  Sincerely,        Gloriann Severe, MD        CC: Dennie Gunner, MD Gloriann Severe, MD  2/5/2021  2:45 PM  Signed  Assessment  1  Cervical disc disorder with radiculopathy of mid-cervical region    2  Cervical spondylosis        Plan  The patient's symptoms, history / physical are consistent with pain that is multifactorial in origin but predominantly the result of her underlying cervical disc bulging and spondylosis which is leading to foraminal narrowing and radicular symptoms into her right arm  At this time, I discussed Dr Batsheva Cohn recommendation that she undergo cervical epidural steroid injection to help reduce swelling inflammation which is leading to her pain symptoms  She was apprised of the most common risks and would like to proceed  She will be scheduled for an upcoming Tuesday or Thursday under fluoroscopic guidance  I advised her that if she does not get relief then we will refer her back to Dr Diomedes Logan for discussion of surgical decompression  Complete risks and benefits including bleeding, infection, tissue reaction, nerve injury and allergic reaction were discussed  The approach was demonstrated using models and literature was provided  Verbal and written consent was obtained  My impressions and treatment recommendations were discussed in detail with the patient who verbalized understanding and had no further questions  Discharge instructions were provided   I personally saw and examined the patient and I agree with the above discussed plan of care     Orders Placed This Encounter   Procedures    FL spine and pain procedure     Standing Status:   Future     Standing Expiration Date:   2/5/2025     Order Specific Question:   Reason for Exam:     Answer:   ILIR     Order Specific Question:   Is the patient pregnant? Answer:   No     Order Specific Question:   Anticoagulant hold needed? Answer:   Ganga Hernandez     No orders of the defined types were placed in this encounter  History of Present Illness    Phuong Valle is a 52 y o  female referred by Dr Saige Cedeno who presents for consultation in regards to neck and right-sided shoulder and arm pain  Symptoms have been present for over a year following a fall in October 2019  Pain is moderate to severe rated 9/10 on numeric rating scale and felt constantly but worse in the evening  Symptoms are sharp and shooting in the neck down the right arm but she denies any weakness  Symptoms are aggravated turning her head, sitting and decreased with lying down  There is no change with standing or bending  Treatment history has included heat / ice and chiropractic manipulation which have provided moderate relief  Use of Tylenol provides minimal relief  She had epidural steroid injections many years ago which provided moderate relief  I have personally reviewed and/or updated the patient's past medical history, past surgical history, family history, social history, current medications, allergies, and vital signs today  Review of Systems   Constitutional: Negative for fever and unexpected weight change  HENT: Negative for trouble swallowing  Eyes: Negative for visual disturbance  Respiratory: Negative for shortness of breath and wheezing  Cardiovascular: Negative for chest pain and palpitations  Gastrointestinal: Negative for constipation, diarrhea, nausea and vomiting  Endocrine: Negative for cold intolerance, heat intolerance and polydipsia     Genitourinary: Negative for difficulty urinating and frequency  Musculoskeletal: Positive for neck pain and neck stiffness  Negative for arthralgias, gait problem, joint swelling and myalgias  Skin: Negative for rash  Neurological: Positive for weakness, numbness and headaches  Negative for dizziness, seizures and syncope  Hematological: Does not bruise/bleed easily  Psychiatric/Behavioral: Negative for dysphoric mood  All other systems reviewed and are negative  Patient Active Problem List   Diagnosis    Chronic migraine without aura without status migrainosus, not intractable    Neck pain    Allergic rhinitis    Atypical ductal hyperplasia of breast    Hypothyroidism    Herniated cervical disc    Weight gain due to medication    Hypertension    DDD (degenerative disc disease), cervical    Strain of tendon of right rotator cuff    SAMMY (obstructive sleep apnea)    Class 1 obesity    Anxiety    Radiculopathy, cervical region       Past Medical History:   Diagnosis Date    Cervicalgia     disc displacement,radiculopathy    Depression     Disease of thyroid gland     Hypertension     last assessed: 8/2/2016    Migraine     Myofascial pain syndrome     Obesity (BMI 30 0-34  9)        Past Surgical History:   Procedure Laterality Date    BREAST BIOPSY Right     biospy w/ marker    CARPAL TUNNEL RELEASE Right     GANGLION CYST EXCISION Left 8/3/2017    Procedure: WRIST/HAND MASS EXCISION;  Surgeon: Lance Monroy MD;  [facility]: [facility];  Service: Orthopedics    HYSTERECTOMY         Family History   Problem Relation Age of Onset    Hypertension Mother    [facility] Other Mother         Bladder surgery    Obesity Mother     Diabetes Father     Hypertension Father     Heart disease Father     Obesity Father     Leukemia Sister     Obesity Sister     No Known Problems Maternal Aunt     No Known Problems Maternal Aunt     Cancer Maternal Grandmother         Bladder    Dementia Maternal Grandfather     Leukemia Paternal Grandmother     Stroke Neg Hx        Social History     Occupational History    Not on file   Tobacco Use    Smoking status: Never Smoker    Smokeless tobacco: Never Used   Substance and Sexual Activity    Alcohol use: No     Comment: per [employer]-drinks socially    Drug use: No    Sexual activity: Not on file       Current Outpatient Medications on File Prior to Visit   Medication Sig    amitriptyline (ELAVIL) 50 mg tablet TAKE 1 TABLET DAILY AT BEDTIME    amLODIPine (NORVASC) 5 mg tablet Take 1 tablet (5 mg total) by mouth daily    aspirin 81 MG tablet Take 81 mg by mouth daily   Botulinum Toxin Type A 200 units SOLR INJECT UP  UNITS ONCE EVERY 3 MONTHS AS DIRECTED  IM INTO HEAD AND NECK AREA   butalbital-acetaminophen-caffeine (FIORICET,ESGIC) -40 mg per tablet Take 1 tablet by mouth every 4 (four) hours as needed for headaches    Cholecalciferol (VITAMIN D3) 1000 units CAPS Take by mouth    coenzyme Q-10 100 MG capsule Take 100 mg by mouth as needed    escitalopram (LEXAPRO) 20 mg tablet Take 1 tablet (20 mg total) by mouth daily    levothyroxine 25 mcg tablet TAKE 1 TABLET DAILY    magnesium gluconate (MAGONATE) 500 mg tablet Take 500 mg by mouth 2 (two) times a day      POTASSIUM CITRATE ER PO Take 99 mg by mouth daily    zolpidem (AMBIEN CR) 12 5 MG CR tablet Take 1 tablet (12 5 mg total) by mouth daily at bedtime as needed for sleep    zonisamide (ZONEGRAN) 100 mg capsule TAKE 1 CAPSULE DAILY AT BEDTIME    prochlorperazine (COMPAZINE) 10 mg tablet Take 1 tablet (10 mg total) by mouth every 6 (six) hours as needed for nausea or vomiting    Riboflavin (VITAMIN B-2) 25 MG TABS Take by mouth    rizatriptan (MAXALT-MLT) 10 MG disintegrating tablet May repeat in 2 hours if needed    [DISCONTINUED] dexamethasone (DECADRON) 2 mg tablet Take 1 tablet (2 mg total) by mouth daily as needed (migraine)     Current Facility-Administered Medications on File Prior to Visit   Medication    prochlorperazine edisylate (COMPAZINE) injection 10 mg       Allergies   Allergen Reactions    Levofloxacin Anaphylaxis and Swelling     Patient reports face swelled, throat was starting to close  Physical Exam    /87   Pulse 96   Wt 89 4 kg (197 lb)   LMP 11/18/2015   BMI 32 78 kg/m²     Constitutional: normal, well developed, well nourished, alert, in no distress and non-toxic and no overt pain behavior  Eyes: anicteric  HEENT: grossly intact  Neck: supple, symmetric, trachea midline and no masses   Pulmonary:even and unlabored  Cardiovascular:No edema or pitting edema present  Skin:Normal without rashes or lesions and well hydrated  Psychiatric:Mood and affect appropriate  Neurologic:Cranial Nerves II-XII grossly intact  Musculoskeletal:normal     Cervical Spine Exam  Appearance:  Normal lordosis  Palpation/Tenderness:  no tenderness or spasm  Range of Motion:  Flexion:  Minimally limited  with pain  Extension:  Minimally limited  with pain  Lateral Flexion - Left:  Minimally limited  with pain  Lateral Flexion - Right:  Moderately limited  with pain  Rotation - Left:  Minimally limited  with pain  Rotation - Right:  Moderately limited  with pain  Motor Strength:  Left Arm Flexion  5/5  Left Arm Extension  5/5  Right Arm Flexion  5/5  Right Arm Extension  5/5  Left Wrist Flexion  5/5  Left Wrist Extension  5/5  Left Finger Abduction  5/5  Right Finger Abduction  5/5  Left Pincer Grasp  5/5  Reflexes:  Left Biceps:  2+   Right Biceps:  2+   Left Triceps:  2+   Right Triceps:  2+     Imaging    MRI CERVICAL SPINE WITHOUT CONTRAST (12/5/2020)     INDICATION: M54 12: Radiculopathy, cervical region  M50 30:  Other cervical disc degeneration, unspecified cervical region      COMPARISON:  7/12/2013     TECHNIQUE:  Sagittal T1, sagittal T2, sagittal inversion recovery, axial T2, axial  2D merge     IMAGE QUALITY:  Diagnostic     FINDINGS:     ALIGNMENT:  Slight smooth reversal of the normal cervical lordosis  No subluxation  No compression fracture  No scoliosis      MARROW SIGNAL:  Normal marrow signal is identified within the visualized bony structures  No discrete marrow lesion      CERVICAL AND VISUALIZED THORACIC CORD:  Normal signal within the visualized cord      PREVERTEBRAL AND PARASPINAL SOFT TISSUES:  Normal      VISUALIZED POSTERIOR FOSSA:  The visualized posterior fossa demonstrates no abnormal signal      CERVICAL DISC SPACES:     C2-C3:  Normal disc height and signal   Right facet hypertrophic degenerative change is present  No canal stenosis or left foraminal narrowing  Mild to moderate right foraminal narrowing with mass effect upon the dorsal root and foraminal nerve      C3-C4:  Normal disc height and signal   Mild right facet degenerative change with mild foraminal narrowing      C4-C5:  There is loss of disc height with annular bulging and endplate/uncinate joint hypertrophic degenerative change  Mild canal stenosis with effacement of the ventral CSF space  Mild foraminal narrowing      C5-C6:  Disc desiccation and loss of disc height  There is annular bulging with a broad-based left paramedian disc herniation and bilateral uncinate joint hypertrophic osteophyte formation  There is moderate left-sided canal stenosis with mild cord   flattening but no abnormal cord signal   Mild bilateral foraminal narrowing      C6-C7:  Mild annular bulging with a small central disc protrusion and mild endplate hypertrophic change  Mild canal stenosis without foraminal narrowing      C7-T1:  Normal      UPPER THORACIC DISC SPACES:  Normal      IMPRESSION:     Cervical degenerative disc disease with disc herniations and endplate/uncinate joint hypertrophic changes C4-5, C5-6 and C6-7  Stable canal stenosis and foraminal narrowing      At C2-3 and C3-4 there is right facet hypertrophic degenerative change resulting in foraminal narrowing    These changes have progressed since the prior examination with worsening right-sided foraminal narrowing at these levels

## 2021-02-05 NOTE — PATIENT INSTRUCTIONS
Neck Exercises   WHAT YOU NEED TO KNOW:   Why is it important to do neck exercises? Neck exercises help reduce neck pain, and improve neck movement and strength  Neck exercises also help prevent long-term neck problems  What do I need to know about neck exercises? · Do the exercises every day,  or as often as directed by your healthcare provider  · Move slowly, gently, and smoothly  Avoid fast or jerky motions  · Stand and sit the way your healthcare provider shows you  Good posture may reduce your neck pain  Check your posture often, even when you are not doing your neck exercises  How do I perform neck exercises safely? · Exercise position:  You may sit or stand while you do neck exercises  Face forward  Your shoulders should be straight and relaxed, with a good posture  · Head tilts, forward and back:  Gently bow your head and try to touch your chin to your chest  Your healthcare provider may tell you to push on the back of your neck to help bow your head  Raise your chin back to the starting position  Tilt your head back as far as possible so you are looking up at the ceiling  Your healthcare provider may tell you to lift your chin to help tilt your head back  Return your head to the starting position  · Head tilts, side to side:  Tilt your head, bringing your ear toward your shoulder  Then tilt your head toward the other shoulder  · Head turns:  Turn your head to look over your shoulder  Tilt your chin down and try to touch it to your shoulder  Do not raise your shoulder to your chin  Face forward again  Do the same on the other side  · Head rolls:  Slowly bring your chin toward your chest  Next, roll your head to the right  Your ear should be positioned over your shoulder  Hold this position for 5 seconds  Roll your head back toward your chest and to the left into the same position  Hold for 5 seconds   Gently roll your head back and around in a clockwise [place] 3 times  Next, move your head in the reverse direction (counterclockwise) in a [place] 3 times  Do not shrug your shoulders upwards while you do this exercise  When should I contact my healthcare provider? · Your pain does not get better, or gets worse  · You have questions or concerns about your condition, care, or exercise program     CARE AGREEMENT:   You have the right to help plan your care  Learn about your health condition and how it may be treated  Discuss treatment options with your healthcare providers to decide what care you want to receive  You always have the right to refuse treatment  The above information is an  only  It is not intended as medical advice for individual conditions or treatments  Talk to your doctor, nurse or pharmacist before following any medical regimen to see if it is safe and effective for you  © Copyright [address] Information is for End User's use only and may not be sold, redistributed or otherwise used for commercial purposes   All illustrations and images included in CareNotes® are the copyrighted property of [employer]  or [address] [place] [address]

## 2021-02-05 NOTE — PROGRESS NOTES
Assessment  1  Cervical disc disorder with radiculopathy of mid-cervical region    2  Cervical spondylosis        Plan  The patient's symptoms, history / physical are consistent with pain that is multifactorial in origin but predominantly the result of her underlying cervical disc bulging and spondylosis which is leading to foraminal narrowing and radicular symptoms into her right arm  At this time, I discussed Dr Laura Leonard recommendation that she undergo cervical epidural steroid injection to help reduce swelling inflammation which is leading to her pain symptoms  She was apprised of the most common risks and would like to proceed  She will be scheduled for an upcoming Tuesday or Thursday under fluoroscopic guidance  I advised her that if she does not get relief then we will refer her back to Dr Guzman Morales for discussion of surgical decompression  Complete risks and benefits including bleeding, infection, tissue reaction, nerve injury and allergic reaction were discussed  The approach was demonstrated using models and literature was provided  Verbal and written consent was obtained  My impressions and treatment recommendations were discussed in detail with the patient who verbalized understanding and had no further questions  Discharge instructions were provided  I personally saw and examined the patient and I agree with the above discussed plan of care  Orders Placed This Encounter   Procedures    FL spine and pain procedure     Standing Status:   Future     Standing Expiration Date:   2/5/2025     Order Specific Question:   Reason for Exam:     Answer:   ILIR     Order Specific Question:   Is the patient pregnant? Answer:   No     Order Specific Question:   Anticoagulant hold needed? Answer:   Uma Yadav     No orders of the defined types were placed in this encounter        History of Present Illness    Daisy Barlow is a 52 y o  female referred by Dr Guzman Morales who presents for consultation in regards to neck and right-sided shoulder and arm pain  Symptoms have been present for over a year following a fall in October 2019  Pain is moderate to severe rated 9/10 on numeric rating scale and felt constantly but worse in the evening  Symptoms are sharp and shooting in the neck down the right arm but she denies any weakness  Symptoms are aggravated turning her head, sitting and decreased with lying down  There is no change with standing or bending  Treatment history has included heat / ice and chiropractic manipulation which have provided moderate relief  Use of Tylenol provides minimal relief  She had epidural steroid injections many years ago which provided moderate relief  I have personally reviewed and/or updated the patient's past medical history, past surgical history, family history, social history, current medications, allergies, and vital signs today  Review of Systems   Constitutional: Negative for fever and unexpected weight change  HENT: Negative for trouble swallowing  Eyes: Negative for visual disturbance  Respiratory: Negative for shortness of breath and wheezing  Cardiovascular: Negative for chest pain and palpitations  Gastrointestinal: Negative for constipation, diarrhea, nausea and vomiting  Endocrine: Negative for cold intolerance, heat intolerance and polydipsia  Genitourinary: Negative for difficulty urinating and frequency  Musculoskeletal: Positive for neck pain and neck stiffness  Negative for arthralgias, gait problem, joint swelling and myalgias  Skin: Negative for rash  Neurological: Positive for weakness, numbness and headaches  Negative for dizziness, seizures and syncope  Hematological: Does not bruise/bleed easily  Psychiatric/Behavioral: Negative for dysphoric mood  All other systems reviewed and are negative        Patient Active Problem List   Diagnosis    Chronic migraine without aura without status migrainosus, not intractable    Neck pain    Allergic rhinitis    Atypical ductal hyperplasia of breast    Hypothyroidism    Herniated cervical disc    Weight gain due to medication    Hypertension    DDD (degenerative disc disease), cervical    Strain of tendon of right rotator cuff    SAMMY (obstructive sleep apnea)    Class 1 obesity    Anxiety    Radiculopathy, cervical region       Past Medical History:   Diagnosis Date    Cervicalgia     disc displacement,radiculopathy    Depression     Disease of thyroid gland     Hypertension     last assessed: 8/2/2016    Migraine     Myofascial pain syndrome     Obesity (BMI 30 0-34  9)        Past Surgical History:   Procedure Laterality Date    BREAST BIOPSY Right     biospy w/ marker    CARPAL TUNNEL RELEASE Right     GANGLION CYST EXCISION Left 8/3/2017    Procedure: WRIST/HAND MASS EXCISION;  Surgeon: Troy Pak MD;  Location:  MAIN OR;  Service: Orthopedics    HYSTERECTOMY         Family History   Problem Relation Age of Onset   [facility] Hypertension Mother    [facility] Other Mother         Bladder surgery    Obesity Mother     Diabetes Father     Hypertension Father     Heart disease Father     Obesity Father     Leukemia Sister     Obesity Sister     No Known Problems Maternal Aunt     No Known Problems Maternal Aunt     Cancer Maternal Grandmother         Bladder    Dementia Maternal Grandfather     Leukemia Paternal Grandmother     Stroke Neg Hx        Social History     Occupational History    Not on file   Tobacco Use    Smoking status: Never Smoker    Smokeless tobacco: Never Used   Substance and Sexual Activity    Alcohol use: No     Comment: per [employer]-drinks socially    Drug use: No    Sexual activity: Not on file       Current Outpatient Medications on File Prior to Visit   Medication Sig    amitriptyline (ELAVIL) 50 mg tablet TAKE 1 TABLET DAILY AT BEDTIME    amLODIPine (NORVASC) 5 mg tablet Take 1 tablet (5 mg total) by mouth daily    aspirin 81 MG tablet Take 81 mg by mouth daily   Botulinum Toxin Type A 200 units SOLR INJECT UP  UNITS ONCE EVERY 3 MONTHS AS DIRECTED  IM INTO HEAD AND NECK AREA   butalbital-acetaminophen-caffeine (FIORICET,ESGIC) -40 mg per tablet Take 1 tablet by mouth every 4 (four) hours as needed for headaches    Cholecalciferol (VITAMIN D3) 1000 units CAPS Take by mouth    coenzyme Q-10 100 MG capsule Take 100 mg by mouth as needed    escitalopram (LEXAPRO) 20 mg tablet Take 1 tablet (20 mg total) by mouth daily    levothyroxine 25 mcg tablet TAKE 1 TABLET DAILY    magnesium gluconate (MAGONATE) 500 mg tablet Take 500 mg by mouth 2 (two) times a day   POTASSIUM CITRATE ER PO Take 99 mg by mouth daily    zolpidem (AMBIEN CR) 12 5 MG CR tablet Take 1 tablet (12 5 mg total) by mouth daily at bedtime as needed for sleep    zonisamide (ZONEGRAN) 100 mg capsule TAKE 1 CAPSULE DAILY AT BEDTIME    prochlorperazine (COMPAZINE) 10 mg tablet Take 1 tablet (10 mg total) by mouth every 6 (six) hours as needed for nausea or vomiting    Riboflavin (VITAMIN B-2) 25 MG TABS Take by mouth    rizatriptan (MAXALT-MLT) 10 MG disintegrating tablet May repeat in 2 hours if needed    [DISCONTINUED] dexamethasone (DECADRON) 2 mg tablet Take 1 tablet (2 mg total) by mouth daily as needed (migraine)     Current Facility-Administered Medications on File Prior to Visit   Medication    prochlorperazine edisylate (COMPAZINE) injection 10 mg       Allergies   Allergen Reactions    Levofloxacin Anaphylaxis and Swelling     Patient reports face swelled, throat was starting to close  Physical Exam    /87   Pulse 96   Wt 89 4 kg (197 lb)   LMP 11/18/2015   BMI 32 78 kg/m²     Constitutional: normal, well developed, well nourished, alert, in no distress and non-toxic and no overt pain behavior    Eyes: anicteric  HEENT: grossly intact  Neck: supple, symmetric, trachea midline and no masses Pulmonary:even and unlabored  Cardiovascular:No edema or pitting edema present  Skin:Normal without rashes or lesions and well hydrated  Psychiatric:Mood and affect appropriate  Neurologic:Cranial Nerves II-XII grossly intact  Musculoskeletal:normal     Cervical Spine Exam  Appearance:  Normal lordosis  Palpation/Tenderness:  no tenderness or spasm  Range of Motion:  Flexion:  Minimally limited  with pain  Extension:  Minimally limited  with pain  Lateral Flexion - Left:  Minimally limited  with pain  Lateral Flexion - Right:  Moderately limited  with pain  Rotation - Left:  Minimally limited  with pain  Rotation - Right:  Moderately limited  with pain  Motor Strength:  Left Arm Flexion  5/5  Left Arm Extension  5/5  Right Arm Flexion  5/5  Right Arm Extension  5/5  Left Wrist Flexion  5/5  Left Wrist Extension  5/5  Left Finger Abduction  5/5  Right Finger Abduction  5/5  Left Pincer Grasp  5/5  Reflexes:  Left Biceps:  2+   Right Biceps:  2+   Left Triceps:  2+   Right Triceps:  2+     Imaging    MRI CERVICAL SPINE WITHOUT CONTRAST (12/5/2020)     INDICATION: M54 12: Radiculopathy, cervical region  M50 30: Other cervical disc degeneration, unspecified cervical region      COMPARISON:  7/12/2013     TECHNIQUE:  Sagittal T1, sagittal T2, sagittal inversion recovery, axial T2, axial  2D merge     IMAGE QUALITY:  Diagnostic     FINDINGS:     ALIGNMENT:  Slight smooth reversal of the normal cervical lordosis  No subluxation  No compression fracture  No scoliosis      MARROW SIGNAL:  Normal marrow signal is identified within the visualized bony structures    No discrete marrow lesion      CERVICAL AND VISUALIZED THORACIC CORD:  Normal signal within the visualized cord      PREVERTEBRAL AND PARASPINAL SOFT TISSUES:  Normal      VISUALIZED POSTERIOR FOSSA:  The visualized posterior fossa demonstrates no abnormal signal      CERVICAL DISC SPACES:     C2-C3:  Normal disc height and signal   Right facet hypertrophic degenerative change is present  No canal stenosis or left foraminal narrowing  Mild to moderate right foraminal narrowing with mass effect upon the dorsal root and foraminal nerve      C3-C4:  Normal disc height and signal   Mild right facet degenerative change with mild foraminal narrowing      C4-C5:  There is loss of disc height with annular bulging and endplate/uncinate joint hypertrophic degenerative change  Mild canal stenosis with effacement of the ventral CSF space  Mild foraminal narrowing      C5-C6:  Disc desiccation and loss of disc height  There is annular bulging with a broad-based left paramedian disc herniation and bilateral uncinate joint hypertrophic osteophyte formation  There is moderate left-sided canal stenosis with mild cord   flattening but no abnormal cord signal   Mild bilateral foraminal narrowing      C6-C7:  Mild annular bulging with a small central disc protrusion and mild endplate hypertrophic change  Mild canal stenosis without foraminal narrowing      C7-T1:  Normal      UPPER THORACIC DISC SPACES:  Normal      IMPRESSION:     Cervical degenerative disc disease with disc herniations and endplate/uncinate joint hypertrophic changes C4-5, C5-6 and C6-7  Stable canal stenosis and foraminal narrowing      At C2-3 and C3-4 there is right facet hypertrophic degenerative change resulting in foraminal narrowing    These changes have progressed since the prior examination with worsening right-sided foraminal narrowing at these levels

## 2021-02-07 DIAGNOSIS — I10 ESSENTIAL HYPERTENSION: ICD-10-CM

## 2021-02-08 RX ORDER — AMLODIPINE BESYLATE 5 MG/1
5 TABLET ORAL DAILY
Qty: 30 TABLET | Refills: 0 | Status: SHIPPED | OUTPATIENT
Start: 2021-02-08 | End: 2021-03-21 | Stop reason: SDUPTHER

## 2021-02-09 ENCOUNTER — TELEPHONE (OUTPATIENT)
Dept: PAIN MEDICINE | Facility: CLINIC | Age: 51
End: 2021-02-09

## 2021-02-09 DIAGNOSIS — G47.00 INSOMNIA, UNSPECIFIED TYPE: ICD-10-CM

## 2021-02-09 NOTE — TELEPHONE ENCOUNTER
Scheduled pt for ILIR for 2/25/21  Pt denies rx blood thinners  Pt is taking self prescribed aspirin and was instructed to hold for 6 days with last dose on 2/18/21    Pt denies other Nsaids  Went over pre-procedure instructions below:  Nothing to eat or drink 1 hr prior to procedure  Need to arrange transportation  Proper clothing for procedure  If ill or placed on antibiotics please call to reschedule  Covid/travel/ and vaccine instructions

## 2021-02-10 RX ORDER — ZOLPIDEM TARTRATE 12.5 MG/1
12.5 TABLET, FILM COATED, EXTENDED RELEASE ORAL
Qty: 30 TABLET | Refills: 0 | Status: SHIPPED | OUTPATIENT
Start: 2021-02-10 | End: 2021-03-13 | Stop reason: SDUPTHER

## 2021-02-23 ENCOUNTER — TELEPHONE (OUTPATIENT)
Dept: NEUROLOGY | Facility: CLINIC | Age: 51
End: 2021-02-23

## 2021-02-23 NOTE — TELEPHONE ENCOUNTER
Called pt to confirm upcoming apt in 2 weeks on 3/9/21 with Les Amaro    Asked pt if any new/worsening symptoms to report? Nothing to report at this time    Asked pt if they are unable to keep apt or interested in virtual apt to please call office, also advised of no show fee  Advised we will call closer to day of apt for COVID screening

## 2021-02-25 ENCOUNTER — HOSPITAL ENCOUNTER (OUTPATIENT)
Dept: RADIOLOGY | Facility: CLINIC | Age: 51
Discharge: HOME | End: 2021-02-25
Attending: ANESTHESIOLOGY | Admitting: ANESTHESIOLOGY
Payer: COMMERCIAL

## 2021-02-25 VITALS
SYSTOLIC BLOOD PRESSURE: 136 MMHG | TEMPERATURE: 98.8 F | OXYGEN SATURATION: 98 % | HEART RATE: 87 BPM | RESPIRATION RATE: 20 BRPM | DIASTOLIC BLOOD PRESSURE: 92 MMHG

## 2021-02-25 DIAGNOSIS — M50.120 CERVICAL DISC DISORDER WITH RADICULOPATHY OF MID-CERVICAL REGION: ICD-10-CM

## 2021-02-25 PROCEDURE — 62321 NJX INTERLAMINAR CRV/THRC: CPT | Performed by: ANESTHESIOLOGY

## 2021-02-25 RX ORDER — METHYLPREDNISOLONE ACETATE 80 MG/ML
80 INJECTION, SUSPENSION INTRA-ARTICULAR; INTRALESIONAL; INTRAMUSCULAR; PARENTERAL; SOFT TISSUE ONCE
Status: COMPLETED | OUTPATIENT
Start: 2021-02-25 | End: 2021-02-25

## 2021-02-25 RX ORDER — LIDOCAINE HYDROCHLORIDE 10 MG/ML
5 INJECTION, SOLUTION EPIDURAL; INFILTRATION; INTRACAUDAL; PERINEURAL ONCE
Status: COMPLETED | OUTPATIENT
Start: 2021-02-25 | End: 2021-02-25

## 2021-02-25 RX ADMIN — METHYLPREDNISOLONE ACETATE 80 MG: 80 INJECTION, SUSPENSION INTRA-ARTICULAR; INTRALESIONAL; INTRAMUSCULAR; PARENTERAL; SOFT TISSUE at 15:43

## 2021-02-25 RX ADMIN — LIDOCAINE HYDROCHLORIDE 5 ML: 10 INJECTION, SOLUTION EPIDURAL; INFILTRATION; INTRACAUDAL; PERINEURAL at 15:39

## 2021-02-25 RX ADMIN — IOHEXOL 1 ML: 300 INJECTION, SOLUTION INTRAVENOUS at 15:43

## 2021-02-25 NOTE — DISCHARGE INSTR - LAB
Epidural Steroid Injection   WHAT YOU NEED TO KNOW:   An epidural steroid injection (GREG) is a procedure to inject steroid medicine into the epidural space  The epidural space is between your spinal cord and vertebrae  Steroids reduce inflammation and fluid buildup in your spine that may be causing pain  You may be given pain medicine along with the steroids  ACTIVITY  · Do not drive or operate machinery today  · No strenuous activity today - bending, lifting, etc   · You may resume normal activites starting tomorrow - start slowly and as tolerated  · You may shower today, but no tub baths or hot tubs  · You may have numbness for several hours from the local anesthetic  Please use caution and common sense, especially with weight-bearing activities  CARE OF THE INJECTION SITE  · If you have soreness or pain, apply ice to the area today (20 minutes on/20 minutes off)  · Starting tomorrow, you may use warm, moist heat or ice if needed  · You may have an increase or change in your discomfort for 36-48 hours after your treatment  · Apply ice and continue with any pain medication you have been prescribed  · Notify [facility] if you have any of the following: redness, drainage, swelling, headache, stiff neck or fever above 100°F     SPECIAL INSTRUCTIONS  · Our office will contact you in approximately 7 days for a progress report  MEDICATIONS  · Continue to take all routine medications  · Our office may have instructed you to hold some medications  If you have a problem specifically related to your procedure, please call our office at [phone]  Problems not related to your procedure should be directed to your primary care physician

## 2021-02-25 NOTE — H&P
History of Present Illness: The patient is a 52 y o  female who presents with complaints of Neck pain secondary cervical disc bulging and stenosis and is here today for cervical epidural steroid injection  Patient Active Problem List   Diagnosis    Chronic migraine without aura without status migrainosus, not intractable    Neck pain    Allergic rhinitis    Atypical ductal hyperplasia of breast    Hypothyroidism    Herniated cervical disc    Weight gain due to medication    Hypertension    DDD (degenerative disc disease), cervical    Strain of tendon of right rotator cuff    SAMMY (obstructive sleep apnea)    Class 1 obesity    Anxiety    Radiculopathy, cervical region       Past Medical History:   Diagnosis Date    Cervicalgia     disc displacement,radiculopathy    Depression     Disease of thyroid gland     Hypertension     last assessed: 8/2/2016    Migraine     Myofascial pain syndrome     Obesity (BMI 30 0-34  9)        Past Surgical History:   Procedure Laterality Date    BREAST BIOPSY Right     biospy w/ marker    CARPAL TUNNEL RELEASE Right     GANGLION CYST EXCISION Left 8/3/2017    Procedure: WRIST/HAND MASS EXCISION;  Surgeon: Debbie Cheatham MD;  Location: [facility] OR;  Service: Orthopedics    HYSTERECTOMY           Current Outpatient Medications:     amitriptyline (ELAVIL) 50 mg tablet, TAKE 1 TABLET DAILY AT BEDTIME, Disp: 90 tablet, Rfl: 3    amLODIPine (NORVASC) 5 mg tablet, Take 1 tablet (5 mg total) by mouth daily, Disp: 30 tablet, Rfl: 0    aspirin 81 MG tablet, Take 81 mg by mouth daily  , Disp: , Rfl:     Botulinum Toxin Type A 200 units SOLR, INJECT UP  UNITS ONCE EVERY 3 MONTHS AS DIRECTED   IM INTO HEAD AND NECK AREA , Disp: 200 Units, Rfl: 3    butalbital-acetaminophen-caffeine (FIORICET,ESGIC) -40 mg per tablet, Take 1 tablet by mouth every 4 (four) hours as needed for headaches, Disp: 30 tablet, Rfl: 0    Cholecalciferol (VITAMIN D3) 1000 units CAPS, Take by mouth, Disp: , Rfl:     coenzyme Q-10 100 MG capsule, Take 100 mg by mouth as needed, Disp: , Rfl:     escitalopram (LEXAPRO) 20 mg tablet, Take 1 tablet (20 mg total) by mouth daily, Disp: 30 tablet, Rfl: 3    levothyroxine 25 mcg tablet, TAKE 1 TABLET DAILY, Disp: 90 tablet, Rfl: 4    magnesium gluconate (MAGONATE) 500 mg tablet, Take 500 mg by mouth 2 (two) times a day , Disp: , Rfl:     POTASSIUM CITRATE ER PO, Take 99 mg by mouth daily, Disp: , Rfl:     prochlorperazine (COMPAZINE) 10 mg tablet, Take 1 tablet (10 mg total) by mouth every 6 (six) hours as needed for nausea or vomiting, Disp: 10 tablet, Rfl: 0    Riboflavin (VITAMIN B-2) 25 MG TABS, Take by mouth, Disp: , Rfl:     rizatriptan (MAXALT-MLT) 10 MG disintegrating tablet, May repeat in 2 hours if needed, Disp: 9 tablet, Rfl: 0    zolpidem (AMBIEN CR) 12 5 MG CR tablet, Take 1 tablet (12 5 mg total) by mouth daily at bedtime as needed for sleep, Disp: 30 tablet, Rfl: 0    zonisamide (ZONEGRAN) 100 mg capsule, TAKE 1 CAPSULE DAILY AT BEDTIME, Disp: 90 capsule, Rfl: 3    Current Facility-Administered Medications:     iohexol (OMNIPAQUE) 300 mg/mL injection 50 mL, 50 mL, Epidural, Once, Obed Jones MD    lidocaine (PF) (XYLOCAINE-MPF) 1 % injection 5 mL, 5 mL, Infiltration, Once, Obed Jones MD    methylPREDNISolone acetate (DEPO-MEDROL) injection 80 mg, 80 mg, Epidural, Once, Obed Jones MD    prochlorperazine edisylate (COMPAZINE) injection 10 mg, 10 mg, Intramuscular, Q4H PRN, Lorne Moya PA-C, 10 mg at 01/26/18 1517    Allergies   Allergen Reactions    Levofloxacin Anaphylaxis and Swelling     Patient reports face swelled, throat was starting to close         Physical Exam:   Vitals:    02/25/21 1522   BP: 127/87   Pulse: 89   Resp: 20   Temp: 98 8 °F (37 1 °C)   SpO2: 97%     General: Awake, Alert, Oriented x 3, Mood and affect appropriate  Respiratory: Respirations even and unlabored  Cardiovascular: Peripheral pulses intact; no edema  Musculoskeletal Exam:   Neck pain    ASA Score: 2    Patient/Chart Verification  Patient ID Verified: Verbal  Consents Confirmed: To be obtained in the Pre-Procedure area  H&P( within 30 days) Verified: To be obtained in the Pre-Procedure area  Allergies Reviewed: Yes  Anticoag/NSAID held?: Yes(ASA held 7 days)  Currently on antibiotics?: No    Assessment:   1   Cervical disc disorder with radiculopathy of mid-cervical region        Plan: ILIR

## 2021-03-04 ENCOUNTER — TELEPHONE (OUTPATIENT)
Dept: PAIN MEDICINE | Facility: CLINIC | Age: 51
End: 2021-03-04

## 2021-03-06 DIAGNOSIS — E03.9 HYPOTHYROIDISM, UNSPECIFIED TYPE: ICD-10-CM

## 2021-03-08 RX ORDER — LEVOTHYROXINE SODIUM 0.03 MG/1
TABLET ORAL
Qty: 90 TABLET | Refills: 3 | Status: SHIPPED | OUTPATIENT
Start: 2021-03-08 | End: 2022-03-01

## 2021-03-09 ENCOUNTER — OFFICE VISIT (OUTPATIENT)
Dept: NEUROLOGY | Facility: CLINIC | Age: 51
End: 2021-03-09
Payer: COMMERCIAL

## 2021-03-09 VITALS
BODY MASS INDEX: 35.42 KG/M2 | WEIGHT: 212.6 LBS | DIASTOLIC BLOOD PRESSURE: 90 MMHG | HEIGHT: 65 IN | SYSTOLIC BLOOD PRESSURE: 137 MMHG | HEART RATE: 98 BPM

## 2021-03-09 DIAGNOSIS — E66.9 CLASS 1 OBESITY: ICD-10-CM

## 2021-03-09 DIAGNOSIS — G43.709 CHRONIC MIGRAINE WITHOUT AURA WITHOUT STATUS MIGRAINOSUS, NOT INTRACTABLE: Primary | ICD-10-CM

## 2021-03-09 DIAGNOSIS — M54.12 RADICULOPATHY, CERVICAL REGION: ICD-10-CM

## 2021-03-09 DIAGNOSIS — F41.9 ANXIETY: ICD-10-CM

## 2021-03-09 DIAGNOSIS — I10 ESSENTIAL HYPERTENSION: ICD-10-CM

## 2021-03-09 PROCEDURE — 99215 OFFICE O/P EST HI 40 MIN: CPT | Performed by: PHYSICIAN ASSISTANT

## 2021-03-09 PROCEDURE — 96372 THER/PROPH/DIAG INJ SC/IM: CPT | Performed by: PHYSICIAN ASSISTANT

## 2021-03-09 PROCEDURE — 3080F DIAST BP >= 90 MM HG: CPT | Performed by: PHYSICIAN ASSISTANT

## 2021-03-09 PROCEDURE — 3075F SYST BP GE 130 - 139MM HG: CPT | Performed by: PHYSICIAN ASSISTANT

## 2021-03-09 RX ORDER — PROCHLORPERAZINE EDISYLATE 5 MG/ML
10 INJECTION INTRAMUSCULAR; INTRAVENOUS ONCE
Status: COMPLETED | OUTPATIENT
Start: 2021-03-09 | End: 2021-03-09

## 2021-03-09 RX ORDER — KETOROLAC TROMETHAMINE 30 MG/ML
60 INJECTION, SOLUTION INTRAMUSCULAR; INTRAVENOUS ONCE
Status: COMPLETED | OUTPATIENT
Start: 2021-03-09 | End: 2021-03-09

## 2021-03-09 RX ADMIN — KETOROLAC TROMETHAMINE 60 MG: 30 INJECTION, SOLUTION INTRAMUSCULAR; INTRAVENOUS at 09:18

## 2021-03-09 RX ADMIN — PROCHLORPERAZINE EDISYLATE 10 MG: 5 INJECTION INTRAMUSCULAR; INTRAVENOUS at 09:19

## 2021-03-09 NOTE — PROGRESS NOTES
[facility]  PATIENT:  Isabella Bland  MRN:  [id number]  :  1970  DATE OF SERVICE:  3/9/2021      Assessment/Plan:     Chronic migraine without aura without status migrainosus, not intractable  Preventative  Continue Amitriptyline 50 mg at bedtime  Continue Zonisamide 100 mg at bedtime  Continue Magnesium, Vit D, Vit B2, CoQ10  Hold Botox at this time  Emgality 2 injection first month then 1 injection every 30 days    Abortive  At onset of migraine, take Rizatriptan 10 mg  May repeat in 2 hours if needed  LImit of 3 a week or 9 a month  May use Prochloraperzine 10mg with the rizatriptan    If unable to break migraine in 24 hours, call us         Problem List Items Addressed This Visit        Cardiovascular and Mediastinum    Chronic migraine without aura without status migrainosus, not intractable - Primary     Preventative  Continue Amitriptyline 50 mg at bedtime  Continue Zonisamide 100 mg at bedtime  Continue Magnesium, Vit D, Vit B2, CoQ10  Hold Botox at this time  Emgality 2 injection first month then 1 injection every 30 days    Abortive  At onset of migraine, take Rizatriptan 10 mg  May repeat in 2 hours if needed  LImit of 3 a week or 9 a month  May use Prochloraperzine 10mg with the rizatriptan    If unable to break migraine in 24 hours, call us         Relevant Medications    ketorolac (TORADOL) 60 mg/2 mL IM injection 60 mg (Completed)    prochlorperazine (COMPAZINE) injection 10 mg (Completed)    Galcanezumab-gnlm 120 MG/ML SOAJ    Galcanezumab-gnlm 120 MG/ML SOAJ    Hypertension       Nervous and Auditory    Radiculopathy, cervical region       Other    Class 1 obesity    Anxiety      Patient did receive Toradol 60 mg IM and Prochlorperazine 10 mg IM for her migraine which was a 9/10        History of Present Illness: We had the pleasure of evaluating Isabella Bland in neurological follow up  today for headaches    As you know,  she is a 52 y o   right handed female  Patient is a teacher at [employer]  Current medical illnesses:  QTc 8/17/2020 426 ms    What medications do you take or have you taken for your headaches? Current Preventive:   Vit D, CoQ10, Magnesium, B2  Amitriptyline, Lexapro  Amlodipine  Zonisamide  Botox  Current Abortive:   Rizatriptan  Prochlorperzine     Prior Preventive:   Topamax,   Amrix, Tizanidine,   Benacar,   olanzapine  Prior Abortive:   Imitrex,   Zofran,   Toradol,   Dexamethasone,   rizatriptan,   olanzapine     Interval updates as of 3/9/2021:  Patient states that she did have some pain in her scalp after the last Botox injections  She did have her hair colored a few days after the injections  Patient states that she doesn't feel her headaches are optimally controlled at this point  Still has migraines which are severe and intense  What is your current pain level ? 9/10  How often do the headaches occur? TTH: 2 times a week; was daily before BOTOX  Migraines: 2-3 a month with BOTOX, prior was daily headaches with 16+ migraine days a month    Are you ever headache free? yes    Headache history with updates: Alternative therapies used in the past for headaches? Massage, chiropractor  Headache are worse if the patient: cough, sneeze, bending over, Exertion  Headache triggers:  Fatigue, Stress/tension, lack of food, heat    Aura/warning and how long does it last ? No     What time of the day do the headaches start? varies    How long do the headaches last?   TTH: rest of the day  Migraines: 1 day to 3 days    Describe your usual headache ? TTH: pressure, throbbing  Migraines: pressure, throbbing, increase in pain, stabbing    Where is your headache located?    Right > left Frontal, Occipital and Retro-orbital     What is the intensity of pain?    TTH: 4-7/10  Migrianes: 8-10/10    Associated symptoms:   - Decrease of appetite, nausea, vomiting,   - Photophobia, phonophobia, sensitivity to smell   - Problem with concentration  - Decreased social functioning  - Tinnitus, light-headed or dizzy, stiff or sore neck,   - prefer to be alone and in a dark room, unable to work    Number of days missed per month because of headaches:  Work (or school) days: 0  Social or Family activities: 1    What time of the year do headaches occur more frequently? During school year  Have you seen someone else for headaches or pain? Yes, PCP  Have you had trigger point injection performed and how often? No  Have you had Botox injection performed and how often? Yes   Have you had epidural injections or transforaminal injections performed? Yes    Have you used CBD or THC for your headaches and how often? Yes, tried CBD oil but didn't help    Are you current pregnant or planning on getting pregnant? No, hysterectomy    Have you ever had any Brain imaging? yes     2013 CT Head  Normal    12/5/2020 MRI C spine  Cervical degenerative disc disease with disc herniations and endplate/uncinate joint hypertrophic changes C4-5, C5-6 and C6-7  Stable canal stenosis and foraminal narrowing      At C2-3 and C3-4 there is right facet hypertrophic degenerative change resulting in foraminal narrowing  These changes have progressed since the prior examination with worsening right-sided foraminal narrowing at these levels  I personally reviewed these images  Reviewed old notes from physician seen in the past- see above HPI for summary of previous encounters  Past Medical History:   Diagnosis Date    Cervicalgia     disc displacement,radiculopathy    Depression     Disease of thyroid gland     Hypertension     last assessed: 8/2/2016    Migraine     Myofascial pain syndrome     Obesity (BMI 30 0-34  9)        Patient Active Problem List   Diagnosis    Chronic migraine without aura without status migrainosus, not intractable    Neck pain    Allergic rhinitis    Atypical ductal hyperplasia of breast    Hypothyroidism    Herniated cervical disc  Weight gain due to medication    Hypertension    DDD (degenerative disc disease), cervical    Strain of tendon of right rotator cuff    SAMMY (obstructive sleep apnea)    Class 1 obesity    Anxiety    Radiculopathy, cervical region    Cervical disc disorder with radiculopathy of mid-cervical region       Medications:      Current Outpatient Medications   Medication Sig Dispense Refill    amitriptyline (ELAVIL) 50 mg tablet TAKE 1 TABLET DAILY AT BEDTIME 90 tablet 3    amLODIPine (NORVASC) 5 mg tablet Take 1 tablet (5 mg total) by mouth daily 30 tablet 0    aspirin 81 MG tablet Take 81 mg by mouth daily   Botulinum Toxin Type A 200 units SOLR INJECT UP  UNITS ONCE EVERY 3 MONTHS AS DIRECTED  IM INTO HEAD AND NECK AREA  200 Units 3    butalbital-acetaminophen-caffeine (FIORICET,ESGIC) -40 mg per tablet Take 1 tablet by mouth every 4 (four) hours as needed for headaches 30 tablet 0    Cholecalciferol (VITAMIN D3) 1000 units CAPS Take by mouth      coenzyme Q-10 100 MG capsule Take 100 mg by mouth as needed      escitalopram (LEXAPRO) 20 mg tablet Take 1 tablet (20 mg total) by mouth daily 30 tablet 3    levothyroxine 25 mcg tablet TAKE 1 TABLET DAILY 90 tablet 3    magnesium gluconate (MAGONATE) 500 mg tablet Take 500 mg by mouth 2 (two) times a day        POTASSIUM CITRATE ER PO Take 99 mg by mouth daily      prochlorperazine (COMPAZINE) 10 mg tablet Take 1 tablet (10 mg total) by mouth every 6 (six) hours as needed for nausea or vomiting 10 tablet 0    Riboflavin (VITAMIN B-2) 25 MG TABS Take by mouth      rizatriptan (MAXALT-MLT) 10 MG disintegrating tablet May repeat in 2 hours if needed 9 tablet 0    zolpidem (AMBIEN CR) 12 5 MG CR tablet Take 1 tablet (12 5 mg total) by mouth daily at bedtime as needed for sleep 30 tablet 0    zonisamide (ZONEGRAN) 100 mg capsule TAKE 1 CAPSULE DAILY AT BEDTIME 90 capsule 3    Galcanezumab-gnlm 120 MG/ML SOAJ Inject 120 mg under the skin every 30 (thirty) days 1 pen 11    Galcanezumab-gnlm 120 MG/ML SOAJ Inject 240 mg under the skin once for 1 dose 2 pen 0     Current Facility-Administered Medications   Medication Dose Route Frequency Provider Last Rate Last Admin    prochlorperazine edisylate (COMPAZINE) injection 10 mg  10 mg Intramuscular Q4H PRN Khai Better, PA-C   10 mg at 01/26/18 1517        Allergies: Allergies   Allergen Reactions    Levofloxacin Anaphylaxis and Swelling     Patient reports face swelled, throat was starting to close         Family History:     Family History   Problem Relation Age of Onset   [place] Hypertension Mother     Other Mother         Bladder surgery    Obesity Mother     Diabetes Father     Hypertension Father     Heart disease Father     Obesity Father     Leukemia Sister     Obesity Sister     No Known Problems Maternal Aunt     No Known Problems Maternal Aunt     Cancer Maternal Grandmother         Bladder    Dementia Maternal Grandfather     Leukemia Paternal Grandmother     Stroke Neg Hx        Social History:     Social History     Socioeconomic History    Marital status: /Civil Union     Spouse name: Not on file    Number of children: Not on file    Years of education: Not on file    Highest education level: Not on file   Occupational History    Not on file   Social Needs    Financial resource strain: Not on file    Food insecurity     Worry: Not on file     Inability: Not on file   [employer] needs     Medical: Not on file     Non-medical: Not on file   Tobacco Use    Smoking status: Never Smoker    Smokeless tobacco: Never Used   Substance and Sexual Activity    Alcohol use: No     Comment: per [employer]-drinks socially    Drug use: No    Sexual activity: Not on file   Lifestyle    Physical activity     Days per week: Not on file     Minutes per session: Not on file    Stress: Not on file   Relationships    Social connections     Talks on phone: Not on file Gets together: Not on file     Attends Latter-day service: Not on file     Active member of club or organization: Not on file     Attends meetings of clubs or organizations: Not on file     Relationship status: Not on file    Intimate partner violence     Fear of current or ex partner: Not on file     Emotionally abused: Not on file     Physically abused: Not on file     Forced sexual activity: Not on file   Other Topics Concern    Not on file   Social History Narrative    Denied coffee, cola, tea consumption per [employer]      I have reviewed the patient's medical, social and surgical history as well as medications in detail and updated the computerized patient record  Objective:   Physical Exam:                                                                   Vitals:            /90 (BP Location: Left arm, Patient Position: Sitting, Cuff Size: Large)   Pulse 98   Ht 5' 5" (1 651 m)   Wt 96 4 kg (212 lb 9 6 oz)   LMP 11/18/2015   BMI 35 38 kg/m²   BP Readings from Last 3 Encounters:   03/09/21 137/90   02/25/21 136/92   02/05/21 126/87     Pulse Readings from Last 3 Encounters:   03/09/21 98   02/25/21 87   02/05/21 96          CONSTITUTIONAL: Well developed, well nourished, well groomed  No dysmorphic features  Eyes:  EOM normal      Neck:  Normal ROM, neck supple  HEENT:  Normocephalic atraumatic  Chest:  Respirations regular and unlabored  Psychiatric:  Normal behavior and appropriate affect      MENTAL STATUS  Orientation: Alert and oriented x 3  Fund of knowledge: Intact  MOTOR (Upper and lower extremities)   Bulk/tone/abnormal movement: Normal muscle bulk and tone  COORDINATION   Station/Gait: Normal baseline gait  Review of Systems:   Review of Systems  Constitutional: Positive for fatigue  Negative for appetite change and fever  HENT: Negative  Negative for hearing loss, tinnitus, trouble swallowing and voice change     Sound sensitivity   Eyes: Positive for photophobia, pain and visual disturbance (auras occasionally (3 weeks ago))  Respiratory: Negative  Negative for shortness of breath  Cardiovascular: Negative  Negative for palpitations  Gastrointestinal: Positive for nausea  Negative for vomiting  Endocrine: Negative  Negative for cold intolerance  Genitourinary: Negative  Negative for dysuria, frequency and urgency  Musculoskeletal: Positive for back pain, joint swelling, myalgias, neck pain and neck stiffness  Skin: Negative  Negative for rash  Neurological: Positive for headaches (average 2 migraines per month  Currently has a migraine with pain score 9 5/10)  Negative for dizziness, tremors, seizures, syncope, facial asymmetry, speech difficulty, weakness, light-headedness and numbness  Hematological: Negative  Does not bruise/bleed easily  Psychiatric/Behavioral: Positive for sleep disturbance  Negative for confusion and hallucinations  I personally reviewed the ROS entered by the MA    I spent 30 minutes in face-to-face discussion regarding  the pathophysiology of her current symptoms and further plan, as well as counseling, educating, and coordinating the patient's care including prognosis of diagnosis, diagnostic results, impression, and recommendations, risks and benefits of treatment, instructions for disease self management, treatment instructions, follow up requirements and risk factors and risk reduction of disease and spent 15 minutes non-face to face    Author:  Ry Arciniega PA-C 3/9/2021 10:01 AM

## 2021-03-09 NOTE — PATIENT INSTRUCTIONS
Preventative  Continue Amitriptyline 50 mg at bedtime  Continue Zonisamide 100 mg at bedtime  Continue Magnesium, Vit D, Vit B2, CoQ10  Emgality 2 injection first month then 1 injection every 30 days    Abortive  At onset of migraine, take Rizatriptan 10 mg  May repeat in 2 hours if needed  LImit of 3 a week or 9 a month  May use Prochloraperzine 10mg with the rizatriptan    If unable to break migraine in 24 hours, call us    Neck pain:   - We discussed the role of neck pathology and poor posture, with straightening of the normal cervical lordosis, in headaches  We discussed how tightening of the neck muscles can irritate the nerves in the occipital region of her head and cause or worsen head pain  We also discussed and demonstrated neck strengthening and relaxation exercises, as well as giving written instructions on these exercises  - We talked about the importance of good posture for improving shoulder, neck, and head pain  The patient was given visualization exercises for correcting posture, which patient will practice at home  If this simple exercise does not help improve the posture, we will consider formal physical therapy in the future  Medication overuse headaches:   - We discussed medication overuse headache [facility]) and how to avoid it in the future  It was explained that all analgesics have the potential to cause medication overuse headache [facility]) and analgesic overuse can negate the effectiveness of headache preventive measures  After successful [address] treatment, preventive medications for an underlying primary headache disorder have a greater chance for success  Avoid medications with narcotics, barbiturates, or caffeine in them as these can cause rebound headaches after very few doses and can interfere with other headache medicine efficacy  Taking any analgesics for more than 2-3 days a week can cause medication overuse headache       Reproductive age women: Should take folic acid daily when taking anti-seizure drugs especially Depakote  [place] Prescription Drug Monitoring Program report was reviewed and was appropriate      Headache management instructions  - When patient has a moderate to severe headache, they should seek rest, initiate relaxation and apply cold compresses to the head  - Maintain regular sleep schedule  Adults need at least 7-8 hours of uninterrupted a night  - Limit over the counter medications such as Tylenol, Ibuprofen, Aleve, Excedrin  (No more than 3 times a week)  - Maintain headache diary  We discussed an [employer] for a smart phone is "Migraine yordy"  - Limit caffeine to 1-2 cups 8 to 16 oz a day or less  - Avoid dietary trigger  (aged cheese, peanuts, MSG, aspartame and nitrates)  - Patient is to have regular frequent meals to prevent headache onset  - Please drink at least 64 ounces of water a day to help remain hydrated  Please call with any questions or concerns   Office number is [phone]

## 2021-03-09 NOTE — PROGRESS NOTES
Review of Systems   Constitutional: Positive for fatigue  Negative for appetite change and fever  HENT: Negative  Negative for hearing loss, tinnitus, trouble swallowing and voice change  Sound sensitivity   Eyes: Positive for photophobia, pain and visual disturbance (auras occasionally (3 weeks ago))  Respiratory: Negative  Negative for shortness of breath  Cardiovascular: Negative  Negative for palpitations  Gastrointestinal: Positive for nausea  Negative for vomiting  Endocrine: Negative  Negative for cold intolerance  Genitourinary: Negative  Negative for dysuria, frequency and urgency  Musculoskeletal: Positive for back pain, joint swelling, myalgias, neck pain and neck stiffness  Skin: Negative  Negative for rash  Neurological: Positive for headaches (average 2 migraines per month  Currently has a migraine with pain score 9 5/10)  Negative for dizziness, tremors, seizures, syncope, facial asymmetry, speech difficulty, weakness, light-headedness and numbness  Hematological: Negative  Does not bruise/bleed easily  Psychiatric/Behavioral: Positive for sleep disturbance  Negative for confusion and hallucinations

## 2021-03-09 NOTE — ASSESSMENT & PLAN NOTE
Preventative  Continue Amitriptyline 50 mg at bedtime  Continue Zonisamide 100 mg at bedtime  Continue Magnesium, Vit D, Vit B2, CoQ10  Hold Botox at this time  Emgality 2 injection first month then 1 injection every 30 days    Abortive  At onset of migraine, take Rizatriptan 10 mg  May repeat in 2 hours if needed    LImit of 3 a week or 9 a month  May use Prochloraperzine 10mg with the rizatriptan    If unable to break migraine in 24 hours, call us

## 2021-03-10 DIAGNOSIS — Z23 ENCOUNTER FOR IMMUNIZATION: ICD-10-CM

## 2021-03-13 DIAGNOSIS — G47.00 INSOMNIA, UNSPECIFIED TYPE: ICD-10-CM

## 2021-03-15 ENCOUNTER — TELEPHONE (OUTPATIENT)
Dept: FAMILY MEDICINE CLINIC | Facility: CLINIC | Age: 51
End: 2021-03-15

## 2021-03-15 RX ORDER — ZOLPIDEM TARTRATE 12.5 MG/1
12.5 TABLET, FILM COATED, EXTENDED RELEASE ORAL
Qty: 30 TABLET | Refills: 0 | Status: SHIPPED | OUTPATIENT
Start: 2021-03-15 | End: 2021-04-12 | Stop reason: SDUPTHER

## 2021-03-15 NOTE — TELEPHONE ENCOUNTER
----- Message from Campbell Huff sent at 3/15/2021  8:17 AM EDT -----  Regarding: FW: Non-Urgent Medical Question  Contact: [phone]    ----- Message -----  From: Susie Reid  Sent: 3/13/2021  10:43 AM EDT  To: [facility]  Subject: Non-Urgent Medical Question                      I have a question about URINALYSIS AUTO ONLY resulted on 5/12/20, 11:22 AM   I am just noticing that the urinalysis showed a trace of blood  I am concerned because my grandmother had bladder cancer, I used to get tested annually  Should I be concerned?     [place] Mckeon

## 2021-03-16 ENCOUNTER — CLINICAL SUPPORT (OUTPATIENT)
Dept: FAMILY MEDICINE CLINIC | Facility: CLINIC | Age: 51
End: 2021-03-16
Payer: COMMERCIAL

## 2021-03-16 VITALS
HEART RATE: 97 BPM | SYSTOLIC BLOOD PRESSURE: 126 MMHG | DIASTOLIC BLOOD PRESSURE: 74 MMHG | HEIGHT: 65 IN | WEIGHT: 214 LBS | OXYGEN SATURATION: 98 % | BODY MASS INDEX: 35.65 KG/M2 | TEMPERATURE: 97.4 F

## 2021-03-16 DIAGNOSIS — R31.9 HEMATURIA, UNSPECIFIED TYPE: Primary | ICD-10-CM

## 2021-03-16 DIAGNOSIS — G43.709 CHRONIC MIGRAINE WITHOUT AURA WITHOUT STATUS MIGRAINOSUS, NOT INTRACTABLE: Primary | ICD-10-CM

## 2021-03-16 LAB
SL AMB  POCT GLUCOSE, UA: ABNORMAL
SL AMB LEUKOCYTE ESTERASE,UA: ABNORMAL
SL AMB POCT BILIRUBIN,UA: ABNORMAL
SL AMB POCT BLOOD,UA: ABNORMAL
SL AMB POCT CLARITY,UA: CLEAR
SL AMB POCT COLOR,UA: YELLOW
SL AMB POCT KETONES,UA: ABNORMAL
SL AMB POCT NITRITE,UA: ABNORMAL
SL AMB POCT PH,UA: ABNORMAL
SL AMB POCT SPECIFIC GRAVITY,UA: ABNORMAL
SL AMB POCT URINE PROTEIN: ABNORMAL
SL AMB POCT UROBILINOGEN: ABNORMAL

## 2021-03-16 PROCEDURE — 99211 OFF/OP EST MAY X REQ PHY/QHP: CPT | Performed by: FAMILY MEDICINE

## 2021-03-16 PROCEDURE — 81002 URINALYSIS NONAUTO W/O SCOPE: CPT | Performed by: FAMILY MEDICINE

## 2021-03-16 PROCEDURE — 3008F BODY MASS INDEX DOCD: CPT | Performed by: FAMILY MEDICINE

## 2021-03-16 PROCEDURE — 1036F TOBACCO NON-USER: CPT | Performed by: FAMILY MEDICINE

## 2021-03-16 RX ORDER — RIMEGEPANT SULFATE 75 MG/75MG
75 TABLET, ORALLY DISINTEGRATING ORAL AS NEEDED
Qty: 8 TABLET | Refills: 3 | Status: SHIPPED | OUTPATIENT
Start: 2021-03-16 | End: 2022-04-03 | Stop reason: SDUPTHER

## 2021-03-16 NOTE — PROGRESS NOTES
Patient came into the office to give urine sample due to blood in urine  Urine showed trace of blood, per Dr Jacob Vanegas pt will have ultrasound of bladder and kidneys

## 2021-03-19 ENCOUNTER — TELEPHONE (OUTPATIENT)
Dept: NEUROLOGY | Facility: CLINIC | Age: 51
End: 2021-03-19

## 2021-03-19 NOTE — TELEPHONE ENCOUNTER
Type Date User Summary Attachment   General 03/09/2021  3:50 PM Rosma care coordination  -   Note    Botox- authorization #: [id number]- 3rd visit- valid from 10/1/2020 until 9/30/2021   Please use [facility]      Thank you,     Mery Prather

## 2021-03-21 DIAGNOSIS — I10 ESSENTIAL HYPERTENSION: ICD-10-CM

## 2021-03-22 RX ORDER — AMLODIPINE BESYLATE 5 MG/1
5 TABLET ORAL DAILY
Qty: 30 TABLET | Refills: 0 | Status: SHIPPED | OUTPATIENT
Start: 2021-03-22 | End: 2021-04-11 | Stop reason: SDUPTHER

## 2021-03-23 ENCOUNTER — HOSPITAL ENCOUNTER (OUTPATIENT)
Dept: ULTRASOUND IMAGING | Facility: HOSPITAL | Age: 51
Discharge: HOME | End: 2021-03-23
Payer: COMMERCIAL

## 2021-03-23 DIAGNOSIS — R31.9 HEMATURIA, UNSPECIFIED TYPE: ICD-10-CM

## 2021-03-23 PROCEDURE — 76770 US EXAM ABDO BACK WALL COMP: CPT

## 2021-03-31 ENCOUNTER — TELEPHONE (OUTPATIENT)
Dept: FAMILY MEDICINE CLINIC | Facility: CLINIC | Age: 51
End: 2021-03-31

## 2021-03-31 DIAGNOSIS — R31.9 HEMATURIA, UNSPECIFIED TYPE: Primary | ICD-10-CM

## 2021-03-31 NOTE — TELEPHONE ENCOUNTER
Patient called  She received a message about seeing urology  She is ok with seeing [facility]   Please enter referral

## 2021-03-31 NOTE — TELEPHONE ENCOUNTER
Called [facility] specialty pharmacy spoke with Armond advised I was calling to initiate a refill request for patient's botox order  Armond states that patient's botox script currently has 2 refills remaining under [facility], advised Dr Melvi Geiger will be leaving our practice and we would like to cancel this Rx and provide a new Rx under new MD Crista Leahy transferred me to pharmacist Baron íDaz to provide a verbal prescription  Provided verbal prescription for Botox 200 units QTY 1 with 3 refills for Dx [id number] under Crista Garvey   Patient's profile will be sent to insurance verification, will call for a status check in 2 days

## 2021-04-02 NOTE — TELEPHONE ENCOUNTER
[address] [place] [address] spoke with Beata Lee, advised I was calling to check the status of patient's botox order  Beata Lee states that patient's order is still in insurance verification   Will call for a status check in 2 days

## 2021-04-05 ENCOUNTER — TELEPHONE (OUTPATIENT)
Dept: UROLOGY | Facility: MEDICAL CENTER | Age: 51
End: 2021-04-05

## 2021-04-05 NOTE — TELEPHONE ENCOUNTER
Per PCP notes, patient presented to PCP's office on 3/16/21 to give urine sample due to blood in the urine  POCT urine dip done in office, showed trace blood, urine noted to be clear, yellow  No formal UA was ordered  PCP did order kidney and bladder U/S  U/S completed on 3/23/21 showed possible left lower pole stone, measuring 1 3 cm, no hydronephrosis  Spoke with patient  Patient reports she recently noticed [facility]20  This UA showed trace blood and RBCs 1-2  Patient reports she became concerned regarding this blood and contacted her PCP  This lead to the recent urine dip at the PCP office  Patient reports she has never seen visible blood in her urine, however reports a family history of bladder cancer in her grandmother  Advised patient on true microscopic hematuria  Discussed having PCP monitor UA for RBC >3 or having new patient appointment with urology  Discussed large stone in kidney, could require urologic intervention to treat  Instructed on non-urgent appointment to address kidney stone  Patient agreeable, scheduled for 6/1/21 at 3:15 in the [place] office with iTna Ward PA-C  Office address and location provided

## 2021-04-05 NOTE — TELEPHONE ENCOUNTER
Please Triage - [address] Patient-     What is the reason for the patients appointment? Patient has blood in urine and patient had Ultrasound of kidneys  and it showed kidney stones  Patient referred by family doctor to follow up with Urology  Imaging/Lab Results:u/s in epic    Do we accept the patient's insurance or is the patient Self-Pay? Provider & Plan: [facility]  Member ID#: Has the patient had any previous urologist(s)?no        Have patient records been requested? Has the patient had any outside testing done?in [facility]    Does the patient have a personal history of cancer?  No       Patient can be reached at :[phone] (D)

## 2021-04-06 ENCOUNTER — TELEPHONE (OUTPATIENT)
Dept: BARIATRICS | Facility: CLINIC | Age: 51
End: 2021-04-06

## 2021-04-06 ENCOUNTER — TELEPHONE (OUTPATIENT)
Dept: FAMILY MEDICINE CLINIC | Facility: CLINIC | Age: 51
End: 2021-04-06

## 2021-04-06 NOTE — TELEPHONE ENCOUNTER
Called [facility] specialty pharmacy spoke with Lázaro Wright, advised I was calling to check the status of patient's botox order  Lázaro Wright states patient's profile is now in [facility] verification  Lázaro Wright escalated patient's profile to STAT and sent an email to the major medical team to have this worked on  Will call for a status check in 2 days

## 2021-04-08 NOTE — TELEPHONE ENCOUNTER
Called [facility] spoke with [facility], advised I was calling to check the status of katharine's botox order  [facility] states that patient;s profile has been verified by [employer] [facility], she states it is in the final stages of verification and should be ready by tomorrow   Will call for a status check in 1 day

## 2021-04-09 NOTE — TELEPHONE ENCOUNTER
Called [facility] specialty pharmacy spoke with Sonia, advised I was calling to check the status of patient's botox order  Sonia states that patient's order is ready to scheduled for delivery, and confirmed patient's consent for shipment is on file  Botox is scheduled for delivery Tuesday 4/13/2021 to the [facility] location via [employer] [facility]jit please await patient's botox delivery and document once received, please advise if medication is not received by 2pm     Thanks  Jones Blair

## 2021-04-11 DIAGNOSIS — I10 ESSENTIAL HYPERTENSION: ICD-10-CM

## 2021-04-12 DIAGNOSIS — G47.00 INSOMNIA, UNSPECIFIED TYPE: ICD-10-CM

## 2021-04-12 RX ORDER — AMLODIPINE BESYLATE 5 MG/1
5 TABLET ORAL DAILY
Qty: 90 TABLET | Refills: 0 | Status: SHIPPED | OUTPATIENT
Start: 2021-04-12 | End: 2021-06-28

## 2021-04-13 RX ORDER — ZOLPIDEM TARTRATE 12.5 MG/1
12.5 TABLET, FILM COATED, EXTENDED RELEASE ORAL
Qty: 30 TABLET | Refills: 0 | Status: SHIPPED | OUTPATIENT
Start: 2021-04-13 | End: 2021-05-13 | Stop reason: SDUPTHER

## 2021-04-13 NOTE — TELEPHONE ENCOUNTER
Botox number of units: 200 unit vial  Botox quantity: 1 vial  Arrived at what location: [facility]  Lot number: [id number]  Expiration Date: 12/31/2023  Appt notes indicate correct medication: Yes    Botox logged and stored in the fridge

## 2021-04-16 ENCOUNTER — TELEPHONE (OUTPATIENT)
Dept: NEUROLOGY | Facility: CLINIC | Age: 51
End: 2021-04-16

## 2021-04-16 NOTE — TELEPHONE ENCOUNTER
LVM for patient to call office regarding reschedule for botox  Per Noah Shane, ask patient if she would like her appt with No Otoole on 5/12/21 to include botox   States we can send her botox via  is so   If she wants to schedule before 5/12/21 send patient call over to Noah Shane to set up

## 2021-05-04 ENCOUNTER — PATIENT MESSAGE (OUTPATIENT)
Dept: NEUROLOGY | Facility: CLINIC | Age: 51
End: 2021-05-04

## 2021-05-05 NOTE — TELEPHONE ENCOUNTER
Called [phone] and left a message on pt's answering machine for a call back to reschedule her appt w/ Marguerite       Message sent to her [employer]

## 2021-05-13 DIAGNOSIS — G47.00 INSOMNIA, UNSPECIFIED TYPE: ICD-10-CM

## 2021-05-14 RX ORDER — ZOLPIDEM TARTRATE 12.5 MG/1
12.5 TABLET, FILM COATED, EXTENDED RELEASE ORAL
Qty: 30 TABLET | Refills: 0 | Status: SHIPPED | OUTPATIENT
Start: 2021-05-14 | End: 2021-06-11 | Stop reason: SDUPTHER

## 2021-05-17 ENCOUNTER — TRANSCRIBE ORDERS (OUTPATIENT)
Dept: ADMINISTRATIVE | Facility: HOSPITAL | Age: 51
End: 2021-05-17

## 2021-05-17 DIAGNOSIS — Z12.31 ENCOUNTER FOR SCREENING MAMMOGRAM FOR MALIGNANT NEOPLASM OF BREAST: Primary | ICD-10-CM

## 2021-05-18 ENCOUNTER — HOSPITAL ENCOUNTER (OUTPATIENT)
Dept: RADIOLOGY | Age: 51
Discharge: HOME | End: 2021-05-18
Payer: COMMERCIAL

## 2021-05-18 VITALS — WEIGHT: 217 LBS | BODY MASS INDEX: 37.05 KG/M2 | HEIGHT: 64 IN

## 2021-05-18 DIAGNOSIS — Z12.31 ENCOUNTER FOR SCREENING MAMMOGRAM FOR MALIGNANT NEOPLASM OF BREAST: ICD-10-CM

## 2021-05-18 PROCEDURE — 77063 BREAST TOMOSYNTHESIS BI: CPT

## 2021-05-18 PROCEDURE — 77067 SCR MAMMO BI INCL CAD: CPT

## 2021-05-30 NOTE — TELEPHONE ENCOUNTER
Appt on 9/10/18 [facility] [place] and pt states that she is not interested in either inhalers and she will follow up yearly as needed.

## 2021-06-01 ENCOUNTER — OFFICE VISIT (OUTPATIENT)
Dept: UROLOGY | Facility: CLINIC | Age: 51
End: 2021-06-01
Payer: COMMERCIAL

## 2021-06-01 ENCOUNTER — APPOINTMENT (OUTPATIENT)
Dept: LAB | Facility: AMBULARY SURGERY CENTER | Age: 51
End: 2021-06-01
Payer: COMMERCIAL

## 2021-06-01 VITALS
WEIGHT: 217 LBS | BODY MASS INDEX: 37.05 KG/M2 | HEIGHT: 64 IN | DIASTOLIC BLOOD PRESSURE: 82 MMHG | SYSTOLIC BLOOD PRESSURE: 132 MMHG

## 2021-06-01 DIAGNOSIS — R31.9 HEMATURIA, UNSPECIFIED TYPE: ICD-10-CM

## 2021-06-01 LAB
ANION GAP SERPL CALCULATED.3IONS-SCNC: 5 MMOL/L (ref 4–13)
BUN SERPL-MCNC: 12 MG/DL (ref 5–25)
CALCIUM SERPL-MCNC: 10.1 MG/DL (ref 8.3–10.1)
CHLORIDE SERPL-SCNC: 106 MMOL/L (ref 100–108)
CO2 SERPL-SCNC: 27 MMOL/L (ref 21–32)
CREAT SERPL-MCNC: 1.02 MG/DL (ref 0.6–1.3)
GFR SERPL CREATININE-BSD FRML MDRD: 65 ML/MIN/1.73SQ M
GLUCOSE SERPL-MCNC: 99 MG/DL (ref 65–140)
POTASSIUM SERPL-SCNC: 3.6 MMOL/L (ref 3.5–5.3)
SODIUM SERPL-SCNC: 138 MMOL/L (ref 136–145)

## 2021-06-01 PROCEDURE — 1036F TOBACCO NON-USER: CPT | Performed by: PHYSICIAN ASSISTANT

## 2021-06-01 PROCEDURE — 99243 OFF/OP CNSLTJ NEW/EST LOW 30: CPT | Performed by: PHYSICIAN ASSISTANT

## 2021-06-01 PROCEDURE — 80048 BASIC METABOLIC PNL TOTAL CA: CPT

## 2021-06-01 PROCEDURE — 36415 COLL VENOUS BLD VENIPUNCTURE: CPT

## 2021-06-01 NOTE — PROGRESS NOTES
UROLOGY CONSULTATION NOTE     Patient Identifiers: Deisy Davis (MRN: [id number])  Service Requesting ConsultationPriscilla Cazares DO:   Service Providing Consultation:  Urology, Nena Thibodeaux PA-C  Consults  Date of Service: 6/1/2021    Reason for Consultation:   Nephrolithiasis and micro hematuria    History of Present Illness:     Deisy Davis is a 52 y o  Female with history kidney stones  She passed a small stone measuring 4 mm in 2016  Henny Jude She had a routine evaluation at her family doctor finding microscopic hematuria which prompted a kidney and bladder ultrasound  This showed a 1 3 cm stone in the lower pole of left kidney  She has no pain no burning no gross hematuria  She is concerned about her hematuria because a relative had bladder cancer  She is a never smoker  She does not get urinary tract infections and not had gross hematuria  Past Medical, Past Surgical History:     Past Medical History:   Diagnosis Date    Cervicalgia     disc displacement,radiculopathy    Depression     Disease of thyroid gland     Hypertension     last assessed: 8/2/2016    Migraine     Myofascial pain syndrome     Obesity (BMI 30 0-34 9)    :    Past Surgical History:   Procedure Laterality Date    BREAST BIOPSY Right     biospy w/ marker    CARPAL TUNNEL RELEASE Right     GANGLION CYST EXCISION Left 8/3/2017    Procedure: WRIST/HAND MASS EXCISION;  Surgeon: Amaya Singh MD;  [facility];  Service: Orthopedics    HYSTERECTOMY     :    Medications, Allergies:     Current Outpatient Medications:     amitriptyline (ELAVIL) 50 mg tablet, TAKE 1 TABLET DAILY AT BEDTIME, Disp: 90 tablet, Rfl: 3    amLODIPine (NORVASC) 5 mg tablet, Take 1 tablet (5 mg total) by mouth daily, Disp: 90 tablet, Rfl: 0    aspirin 81 MG tablet, Take 81 mg by mouth daily  , Disp: , Rfl:     butalbital-acetaminophen-caffeine (FIORICET,ESGIC) -40 mg per tablet, Take 1 tablet by mouth every 4 (four) hours as needed for headaches, Disp: 30 tablet, Rfl: 0    Cholecalciferol (VITAMIN D3) 1000 units CAPS, Take by mouth, Disp: , Rfl:     coenzyme Q-10 100 MG capsule, Take 100 mg by mouth as needed, Disp: , Rfl:     escitalopram (LEXAPRO) 20 mg tablet, Take 1 tablet (20 mg total) by mouth daily, Disp: 30 tablet, Rfl: 3    Galcanezumab-gnlm 120 MG/ML SOAJ, Inject 120 mg under the skin every 30 (thirty) days, Disp: 1 pen, Rfl: 11    levothyroxine 25 mcg tablet, TAKE 1 TABLET DAILY, Disp: 90 tablet, Rfl: 3    magnesium gluconate (MAGONATE) 500 mg tablet, Take 500 mg by mouth 2 (two) times a day , Disp: , Rfl:     POTASSIUM CITRATE ER PO, Take 99 mg by mouth daily, Disp: , Rfl:     Riboflavin (VITAMIN B-2) 25 MG TABS, Take by mouth, Disp: , Rfl:     Rimegepant Sulfate (Nurtec) 75 MG TBDP, Take 75 mg by mouth as needed (migraine), Disp: 8 tablet, Rfl: 3    zolpidem (AMBIEN CR) 12 5 MG CR tablet, Take 1 tablet (12 5 mg total) by mouth daily at bedtime as needed for sleep, Disp: 30 tablet, Rfl: 0    zonisamide (ZONEGRAN) 100 mg capsule, TAKE 1 CAPSULE DAILY AT BEDTIME, Disp: 90 capsule, Rfl: 3    Botulinum Toxin Type A 200 units SOLR, INJECT UP  UNITS ONCE EVERY 3 MONTHS AS DIRECTED  IM INTO HEAD AND NECK AREA  (Patient not taking: Reported on 6/1/2021), Disp: 200 Units, Rfl: 3    prochlorperazine (COMPAZINE) 10 mg tablet, Take 1 tablet (10 mg total) by mouth every 6 (six) hours as needed for nausea or vomiting (Patient not taking: Reported on 6/1/2021), Disp: 10 tablet, Rfl: 0    rizatriptan (MAXALT-MLT) 10 MG disintegrating tablet, May repeat in 2 hours if needed (Patient not taking: Reported on 6/1/2021), Disp: 9 tablet, Rfl: 0    Current Facility-Administered Medications:     prochlorperazine edisylate (COMPAZINE) injection 10 mg, 10 mg, Intramuscular, Q4H PRN, Barbara Kilgore PA-C, 10 mg at 01/26/18 6254    Allergies:   Allergies   Allergen Reactions    Levofloxacin Anaphylaxis and Swelling Patient reports face swelled, throat was starting to close    :    Social and Family History:   Social History:   Social History     Tobacco Use    Smoking status: Never Smoker    Smokeless tobacco: Never Used   Substance Use Topics    Alcohol use: No     Comment: per [employer]-drinks socially    Drug use: No        Social History     Tobacco Use   Smoking Status Never Smoker   Smokeless Tobacco Never Used       Family History:  Family History   Problem Relation Age of Onset    Hypertension Mother     Other Mother         Bladder surgery    Obesity Mother     Cancer Mother 62        abdominal cancer    Diabetes Father     Hypertension Father     Heart disease Father     Obesity Father     Leukemia Sister     Obesity Sister     No Known Problems Maternal Aunt     No Known Problems Maternal Aunt     Cancer Maternal Grandmother 79        Bladder    Dementia Maternal Grandfather     Leukemia Paternal Grandmother     No Known Problems Paternal Grandfather     No Known Problems Son     No Known Problems Son     Stroke Neg Hx    :     Review of Systems:     General: Fever, chills, or night sweats: negative  Cardiac: Negative for chest pain  Pulmonary: Negative for shortness of breath  Gastrointestinal: Abdominal pain negative  Nausea, vomiting, or diarrhea negative,  Genitourinary: See HPI above  Patient does not have hematuria  All other systems queried were negative  Physical Exam:   General: Patient is pleasant and in NAD  Awake and alert  /82 (BP Location: Left arm, Patient Position: Sitting, Cuff Size: Adult)   Ht 5' 4" (1 626 m)   Wt 98 4 kg (217 lb)   LMP 11/18/2015   BMI 37 25 kg/m²   HEENT: conjunctiva clear  Constitutional:  pleasant and cooperative     no apparent distress  Cardiac: Peripheral edema: negative  Pulmonary: Non-labored breathing  Abdomen: Soft, non-tender, non-distended  No surgical scars  No masses, tenderness, hernias noted      Genitourinary: Negative CVA tenderness, negative suprapubic tenderness  Extremities: moves all extremities  Neurological:CNII-XII intact  No numbness or tingling  Essentially non focal neurologic exam  Psychiatric:mood affect and behavior normal        Labs:     Lab Results   Component Value Date    HGB 15 1 05/12/2020    HCT 44 5 05/12/2020    WBC 6 60 05/12/2020     05/12/2020   ]    Lab Results   Component Value Date     12/20/2014    K 3 9 05/12/2020     05/12/2020    CO2 24 05/12/2020    BUN 11 05/12/2020    CREATININE 1 13 05/12/2020    CALCIUM 8 7 05/12/2020    GLUCOSE 91 12/20/2014   ]    Imaging:   I personally reviewed the images and report of the following studies, and reviewed them with the patient:  RENAL ULTRASOUND      IMPRESSION:     Echogenic focus in the lower pole of the left kidney may represent stone  No hydronephrosis    ASSESSMENT:      1  Nephrolithiasis   2  Microscopic hematuria    PLAN:   - I recommend she have a CT scan for hematuria study  - if she is going to have her stone treated then she may have a at cystoscopy under anesthesia at the same time  - if the stone does not require treatment then office cystoscopy would be indicated  - I will call her with the results of her CT scan and discuss her options    Thank you for allowing me to participate in this patients care  Please do not hesitate to call with any additional questions    Glynn Austin PA-C

## 2021-06-02 DIAGNOSIS — F41.9 ANXIETY: ICD-10-CM

## 2021-06-02 RX ORDER — ESCITALOPRAM OXALATE 20 MG/1
TABLET ORAL
Qty: 30 TABLET | Refills: 3 | Status: SHIPPED | OUTPATIENT
Start: 2021-06-02 | End: 2021-09-22

## 2021-06-11 ENCOUNTER — RA CDI HCC (OUTPATIENT)
Dept: OTHER | Facility: HOSPITAL | Age: 51
End: 2021-06-11

## 2021-06-11 DIAGNOSIS — G47.00 INSOMNIA, UNSPECIFIED TYPE: ICD-10-CM

## 2021-06-11 RX ORDER — ZOLPIDEM TARTRATE 12.5 MG/1
12.5 TABLET, FILM COATED, EXTENDED RELEASE ORAL
Qty: 30 TABLET | Refills: 0 | Status: SHIPPED | OUTPATIENT
Start: 2021-06-11 | End: 2021-07-11 | Stop reason: SDUPTHER

## 2021-06-11 NOTE — TELEPHONE ENCOUNTER
05/14/2021  1  05/14/2021  ZOLPIDEM TART ER 12 5 MG TAB  30 0  30  [facility]  [id number]  [facility] ([id number])

## 2021-06-11 NOTE — PROGRESS NOTES
[facility] [employer]  coding opportunities             Chart reviewed, (number of) suggestions sent to provider: 1            Number of suggestions actually used: 0      Number of suggestions NOT actually used: 1     Patients insurance company: [employer] (Medicare Advantage and Commercial)     Visit status: Patient arrived for their scheduled appointment     Provider never responded to [employer]  coding request     [facility] [employer]  coding opportunities             Chart reviewed, (number of) suggestions sent to provider: 1                  Patients insurance company: [employer] (Medicare Advantage and Commercial)               BMI>35   E66 01- Morbid (severe) obesity due to excess calories ([place] [id number] )    Z68  35- Z68 --  - Body mass index (BMI), adult  (Pick one from the Z68 35 - E34 --)           Per CMS/ICD 10 coding guidelines, to be used when BMI > 35 & <40 with one or more comorbidity (HTN,  SAMMY)

## 2021-06-17 ENCOUNTER — OFFICE VISIT (OUTPATIENT)
Dept: FAMILY MEDICINE CLINIC | Facility: CLINIC | Age: 51
End: 2021-06-17
Payer: COMMERCIAL

## 2021-06-17 VITALS
OXYGEN SATURATION: 99 % | WEIGHT: 217 LBS | HEIGHT: 64 IN | TEMPERATURE: 97.2 F | DIASTOLIC BLOOD PRESSURE: 82 MMHG | HEART RATE: 107 BPM | BODY MASS INDEX: 37.05 KG/M2 | SYSTOLIC BLOOD PRESSURE: 122 MMHG

## 2021-06-17 DIAGNOSIS — E03.9 HYPOTHYROIDISM, UNSPECIFIED TYPE: ICD-10-CM

## 2021-06-17 DIAGNOSIS — Z01.818 PREOPERATIVE CLEARANCE: Primary | ICD-10-CM

## 2021-06-17 DIAGNOSIS — I10 ESSENTIAL HYPERTENSION: ICD-10-CM

## 2021-06-17 DIAGNOSIS — G43.709 CHRONIC MIGRAINE WITHOUT AURA WITHOUT STATUS MIGRAINOSUS, NOT INTRACTABLE: ICD-10-CM

## 2021-06-17 DIAGNOSIS — E66.9 CLASS 1 OBESITY: ICD-10-CM

## 2021-06-17 PROCEDURE — 3008F BODY MASS INDEX DOCD: CPT | Performed by: PHYSICIAN ASSISTANT

## 2021-06-17 PROCEDURE — 1036F TOBACCO NON-USER: CPT | Performed by: FAMILY MEDICINE

## 2021-06-17 PROCEDURE — 99243 OFF/OP CNSLTJ NEW/EST LOW 30: CPT | Performed by: FAMILY MEDICINE

## 2021-06-17 RX ORDER — AMITRIPTYLINE HYDROCHLORIDE 50 MG/1
25 TABLET, FILM COATED ORAL
Qty: 90 TABLET | Refills: 3
Start: 2021-06-17 | End: 2021-07-20 | Stop reason: SINTOL

## 2021-06-17 NOTE — PROGRESS NOTES
Patient ID: Tushar Vega is a 52 y o  female  HPI: 52 y  o female is being seen for a preoperative visit for gastric sleeve on 8/16/21  She would like to wean off amitriptyline since migraines are so much better  Surgical Risk Assessment:    Prior anesthesia: Adverse Reaction to : Epidural none    General none   Spinal none  Family history of adverse reactions to anesthesia? Pertinent Past Medical History: hypertension, hypothyroidism    Exercise Capacity:     Able to walk 4 blocks w/o Sx       yes       Able to walk 2 flights of steps w/o Sx   yes    Lifestyle Factors:   Tobacco Use:  none        Pack years  Alcohol Use:    Illicit Drug Use:  none  No transfusions : Baptism         Personal history of venous thromboembolic disease:  none  History of Steroid use for >2 weeks within last year?  none      Family History   Problem Relation Age of Onset    Hypertension Mother    Jose Martin Michael Other Mother         Bladder surgery    Obesity Mother     Cancer Mother 62        abdominal cancer    Diabetes Father     Hypertension Father     Heart disease Father     Obesity Father     Leukemia Sister     Obesity Sister     No Known Problems Maternal Aunt     No Known Problems Maternal Aunt     Cancer Maternal Grandmother 79        Bladder    Dementia Maternal Grandfather     Leukemia Paternal Grandmother     No Known Problems Paternal Grandfather     No Known Problems Son     No Known Problems Son     Stroke Neg Hx      Social History     Socioeconomic History    Marital status: /Civil Union     Spouse name: Not on file    Number of children: Not on file    Years of education: Not on file    Highest education level: Not on file   Occupational History    Not on file   Tobacco Use    Smoking status: Never Smoker    Smokeless tobacco: Never Used   Vaping Use    Vaping Use: Never used   Substance and Sexual Activity    Alcohol use: No     Comment: per [employer]scripts-drinks socially    Drug use: No    Sexual activity: Not on file   Other Topics Concern    Not on file   Social History Narrative    Denied coffee, cola, tea consumption per Allscripts     Social Determinants of Health     Financial Resource Strain:     Difficulty of Paying Living Expenses:    Food Insecurity:     Worried About Running Out of Food in the Last Year:     Ran Out of Food in the Last Year:    Transportation Needs:     Lack of Transportation (Medical):  Lack of Transportation (Non-Medical):    Physical Activity:     Days of Exercise per Week:     Minutes of Exercise per Session:    Stress:     Feeling of Stress :    Social Connections:     Frequency of Communication with Friends and Family:     Frequency of Social Gatherings with Friends and Family:     Attends Jain Services:     Active Member of Clubs or Organizations:     Attends Club or Organization Meetings:     Marital Status:    Intimate Partner Violence:     Fear of Current or Ex-Partner:     Emotionally Abused:     Physically Abused:     Sexually Abused:      Past Medical History:   Diagnosis Date    Cervicalgia     disc displacement,radiculopathy    Depression     Disease of thyroid gland     Hypertension     last assessed: 8/2/2016    Migraine     Myofascial pain syndrome     Obesity (BMI 30 0-34  9)      Past Surgical History:   Procedure Laterality Date    BREAST BIOPSY Right     biospy w/ marker    CARPAL TUNNEL RELEASE Right     GANGLION CYST EXCISION Left 8/3/2017    Procedure: WRIST/HAND MASS EXCISION;  Surgeon: Ari Hernandez MD;  Location:  MAIN OR;  Service: Orthopedics    HYSTERECTOMY       Allergies   Allergen Reactions    Levofloxacin Anaphylaxis and Swelling     Patient reports face swelled, throat was starting to close         Current Outpatient Medications:     amitriptyline (ELAVIL) 50 mg tablet, Take 0 5 tablets (25 mg total) by mouth daily at bedtime, Disp: 90 tablet, Rfl: 3    amLODIPine (NORVASC) 5 mg tablet, Take 1 tablet (5 mg total) by mouth daily, Disp: 90 tablet, Rfl: 0    aspirin 81 MG tablet, Take 81 mg by mouth daily  , Disp: , Rfl:     butalbital-acetaminophen-caffeine (FIORICET,ESGIC) -40 mg per tablet, Take 1 tablet by mouth every 4 (four) hours as needed for headaches, Disp: 30 tablet, Rfl: 0    Cholecalciferol (VITAMIN D3) 1000 units CAPS, Take by mouth, Disp: , Rfl:     coenzyme Q-10 100 MG capsule, Take 100 mg by mouth as needed, Disp: , Rfl:     escitalopram (LEXAPRO) 20 mg tablet, TAKE 1 TABLET(20 MG) BY MOUTH DAILY, Disp: 30 tablet, Rfl: 3    Galcanezumab-gnlm 120 MG/ML SOAJ, Inject 120 mg under the skin every 30 (thirty) days, Disp: 1 pen, Rfl: 11    levothyroxine 25 mcg tablet, TAKE 1 TABLET DAILY, Disp: 90 tablet, Rfl: 3    magnesium gluconate (MAGONATE) 500 mg tablet, Take 500 mg by mouth 2 (two) times a day , Disp: , Rfl:     POTASSIUM CITRATE ER PO, Take 99 mg by mouth daily, Disp: , Rfl:     Riboflavin (VITAMIN B-2) 25 MG TABS, Take by mouth, Disp: , Rfl:     Rimegepant Sulfate (Nurtec) 75 MG TBDP, Take 75 mg by mouth as needed (migraine), Disp: 8 tablet, Rfl: 3    zolpidem (AMBIEN CR) 12 5 MG CR tablet, Take 1 tablet (12 5 mg total) by mouth daily at bedtime as needed for sleep, Disp: 30 tablet, Rfl: 0    Current Facility-Administered Medications:     prochlorperazine edisylate (COMPAZINE) injection 10 mg, 10 mg, Intramuscular, Q4H PRN, Rupali Hoover PA-C, 10 mg at 01/26/18 1517     Review of Systems    Consitutional:  Denies, chills, fatigue, fever and malaise   ENT:  Denies, blurry vision, double vision, eye pain, epistaxis, nasal discharge and nasal congestion   Pulmonary:  No cough, shortness of breath, dyspnea on exertion    Cardiovascular:  Denies chest pain/pressure  chest pain/pressure  palpitations   Abdomen:   Denies abdominal pain, nausea, vomiting, diarrhea, constipation    Genitourinary:  no urinary symptoms, change in urinary stream, incontinence and dysuria   Hematology/Lymphatics:   Denies, blood clots, bruising, jaundice, night sweats   Musculoskeletal:  No gait disturbance, myalgia,  arthalgia or muscle weakness    Integumentary:  Denies ecchymosis, petechiae, rash or lesions   Neurological:  Denies headaches, dizziness, confusion, loss of consciousness or behavioral changes  Psychological:  Denies anxiety, depression or sleep disturbances      OBJECTIVE    /82   Pulse (!) 107   Temp (!) 97 2 °F (36 2 °C)   Ht 5' 4" (1 626 m)   Wt 98 4 kg (217 lb)   LMP 11/18/2015   SpO2 99%   BMI 37 25 kg/m²     Constitutional:  Well appearing and in no acute distress  ENT:  ENT exam normal, no neck nodes or sinus tenderness  TM normal without fluid or infection  throat normal without erythema or exudate   Pulmonary:  clear to auscultation bilaterally and no crackles, no wheezes, chest expansion normal  Cardiovascular:  S1S2, regular rate and rhythm  Gastrointestinal:  abdomen is soft without significant tenderness  no masses  no organmegaly  Genitourinary:  none  Lymphatic:  no lymphadenopathy   Musculoskeletal:  no joint tenderness, deformity or swelling, no muscular tenderness noted, full range of motion without pain  Skin:  skin color, texture and turgor are normal; no bruising, rashes or lesions noted  Neurologic:  Alert and oriented x 4, No motor deficits, CN I-XII intact, Normal Reflexes and Normal Sensation      DATA:  Laboratory Results:     Lab Results   Component Value Date    ALT 44 05/12/2020    AST 26 05/12/2020    BUN 12 06/01/2021    CALCIUM 10 1 06/01/2021     06/01/2021    CHOL 179 11/13/2014    CO2 27 06/01/2021    CREATININE 1 02 06/01/2021    HDL 35 (L) 05/12/2020    HCT 44 5 05/12/2020    HGB 15 1 05/12/2020    HGBA1C 5 3 03/26/2021     05/12/2020    K 3 6 06/01/2021     12/20/2014    TRIG 155 (H) 05/12/2020    WBC 6 60 05/12/2020     ECG: NSR without acute changes      Current medications which may produce withdrawal symptoms if withheld perioperatively: none    Pre-op Evaluation Plan  1  Further preoperative work-up as follows:none  2  Medication Management/Recommendations:Hold nsaids, multivitamin, fish oil one week prior to surgery   3  Prophylaxis for cardiac events with perioperative beta-blockers: none    Clearance:  Patient is CLEARED for surgery without any additional cardiac testing      Assessment/Plan:  Diagnoses and all orders for this visit:    Preoperative clearance  Comments:  Pt is clear medically for surgery  Class 1 obesity    Chronic migraine without aura without status migrainosus, not intractable  Comments:  Pt being weaned off elavil due to migraines being well controlled  Orders:  -     amitriptyline (ELAVIL) 50 mg tablet; Take 0 5 tablets (25 mg total) by mouth daily at bedtime    Essential hypertension  Comments:  Controlled on curren meds        Hypothyroidism, unspecified type

## 2021-06-18 ENCOUNTER — HOSPITAL ENCOUNTER (OUTPATIENT)
Dept: CT IMAGING | Facility: HOSPITAL | Age: 51
Discharge: HOME | End: 2021-06-18
Payer: COMMERCIAL

## 2021-06-18 DIAGNOSIS — R31.9 HEMATURIA, UNSPECIFIED TYPE: ICD-10-CM

## 2021-06-18 PROCEDURE — G1004 CDSM NDSC: HCPCS

## 2021-06-18 PROCEDURE — 74178 CT ABD&PLV WO CNTR FLWD CNTR: CPT

## 2021-06-18 RX ADMIN — IOHEXOL 120 ML: 350 INJECTION, SOLUTION INTRAVENOUS at 20:07

## 2021-06-28 DIAGNOSIS — I10 ESSENTIAL HYPERTENSION: ICD-10-CM

## 2021-06-28 RX ORDER — AMLODIPINE BESYLATE 5 MG/1
TABLET ORAL
Qty: 90 TABLET | Refills: 3 | Status: SHIPPED | OUTPATIENT
Start: 2021-06-28 | End: 2022-01-03 | Stop reason: SDUPTHER

## 2021-07-11 DIAGNOSIS — G47.00 INSOMNIA, UNSPECIFIED TYPE: ICD-10-CM

## 2021-07-12 RX ORDER — ZOLPIDEM TARTRATE 12.5 MG/1
12.5 TABLET, FILM COATED, EXTENDED RELEASE ORAL
Qty: 30 TABLET | Refills: 0 | Status: SHIPPED | OUTPATIENT
Start: 2021-07-12 | End: 2021-09-03

## 2021-07-20 ENCOUNTER — OFFICE VISIT (OUTPATIENT)
Dept: NEUROLOGY | Facility: CLINIC | Age: 51
End: 2021-07-20
Payer: COMMERCIAL

## 2021-07-20 VITALS
BODY MASS INDEX: 37.25 KG/M2 | SYSTOLIC BLOOD PRESSURE: 126 MMHG | WEIGHT: 217 LBS | HEART RATE: 80 BPM | DIASTOLIC BLOOD PRESSURE: 86 MMHG

## 2021-07-20 DIAGNOSIS — G43.709 CHRONIC MIGRAINE WITHOUT AURA WITHOUT STATUS MIGRAINOSUS, NOT INTRACTABLE: Primary | ICD-10-CM

## 2021-07-20 PROCEDURE — 3079F DIAST BP 80-89 MM HG: CPT | Performed by: PHYSICIAN ASSISTANT

## 2021-07-20 PROCEDURE — 1036F TOBACCO NON-USER: CPT | Performed by: PHYSICIAN ASSISTANT

## 2021-07-20 PROCEDURE — 99214 OFFICE O/P EST MOD 30 MIN: CPT | Performed by: PHYSICIAN ASSISTANT

## 2021-07-20 PROCEDURE — 96372 THER/PROPH/DIAG INJ SC/IM: CPT

## 2021-07-20 PROCEDURE — 3074F SYST BP LT 130 MM HG: CPT | Performed by: PHYSICIAN ASSISTANT

## 2021-07-20 RX ORDER — GABAPENTIN 100 MG/1
CAPSULE ORAL
Qty: 90 CAPSULE | Refills: 3 | Status: SHIPPED | OUTPATIENT
Start: 2021-07-20 | End: 2021-10-12 | Stop reason: CLARIF

## 2021-07-20 RX ORDER — KETOROLAC TROMETHAMINE 30 MG/ML
60 INJECTION, SOLUTION INTRAMUSCULAR; INTRAVENOUS ONCE
Status: COMPLETED | OUTPATIENT
Start: 2021-07-20 | End: 2021-07-20

## 2021-07-20 RX ORDER — OLANZAPINE 5 MG/1
5 TABLET ORAL
Qty: 5 TABLET | Refills: 0 | Status: SHIPPED | OUTPATIENT
Start: 2021-07-20 | End: 2021-11-23 | Stop reason: ALTCHOICE

## 2021-07-20 RX ADMIN — KETOROLAC TROMETHAMINE 60 MG: 30 INJECTION, SOLUTION INTRAMUSCULAR; INTRAVENOUS at 16:10

## 2021-07-20 NOTE — PATIENT INSTRUCTIONS
Chronic migraine without aura without status migrainosus, not intractable  · Pt has had worsening headaches since stopping amitriptyline  · She will be having weight loss surgery next week  · She will take olanzapine 5mg at bedtime for 5 days to break the current headache  · Once she is able to take medications after surgery, she will start gabapentin  She will take 100mg at bedtime for 1 week, then 200mg at bedtime for 1 week then 300mg at bedtime  Side effects were reviewed  · An injection of ketorolac 60mg was given today  She tolerated the medication without difficulty  I have spent 25 minutes with Patient  today in which greater than 50% of this time was spent in counseling/coordination of care regarding Diagnostic results, Prognosis, Risks and benefits of tx options, Intructions for management, Patient and family education, Importance of tx compliance, Risk factor reductions and Impressions  She will follow-up in 4 months

## 2021-07-20 NOTE — ASSESSMENT & PLAN NOTE
· Pt has had worsening headaches since stopping amitriptyline  · She will be having weight loss surgery next week  · She will take olanzapine 5mg at bedtime for 5 days to break the current headache  · Once she is able to take medications after surgery, she will start gabapentin  She will take 100mg at bedtime for 1 week, then 200mg at bedtime for 1 week then 300mg at bedtime  Side effects were reviewed  · An injection of ketorolac 60mg was given today  She tolerated the medication without difficulty  I have spent 25 minutes with Patient  today in which greater than 50% of this time was spent in counseling/coordination of care regarding Diagnostic results, Prognosis, Risks and benefits of tx options, Intructions for management, Patient and family education, Importance of tx compliance, Risk factor reductions and Impressions  She will follow-up in 4 months

## 2021-07-20 NOTE — PROGRESS NOTES
Patient ID: Adal Dyer is a 52 y o  female  Assessment/Plan:    Chronic migraine without aura without status migrainosus, not intractable  · Pt has had worsening headaches since stopping amitriptyline  · She will be having weight loss surgery next week  · She will take olanzapine 5mg at bedtime for 5 days to break the current headache  · Once she is able to take medications after surgery, she will start gabapentin  She will take 100mg at bedtime for 1 week, then 200mg at bedtime for 1 week then 300mg at bedtime  Side effects were reviewed  · An injection of ketorolac 60mg was given today  She tolerated the medication without difficulty  I have spent 25 minutes with Patient  today in which greater than 50% of this time was spent in counseling/coordination of care regarding Diagnostic results, Prognosis, Risks and benefits of tx options, Intructions for management, Patient and family education, Importance of tx compliance, Risk factor reductions and Impressions  She will follow-up in 4 months  Problem List Items Addressed This Visit        Cardiovascular and Mediastinum    Chronic migraine without aura without status migrainosus, not intractable - Primary     · Pt has had worsening headaches since stopping amitriptyline  · She will be having weight loss surgery next week  · She will take olanzapine 5mg at bedtime for 5 days to break the current headache  · Once she is able to take medications after surgery, she will start gabapentin  She will take 100mg at bedtime for 1 week, then 200mg at bedtime for 1 week then 300mg at bedtime  Side effects were reviewed  · An injection of ketorolac 60mg was given today  She tolerated the medication without difficulty    I have spent 25 minutes with Patient  today in which greater than 50% of this time was spent in counseling/coordination of care regarding Diagnostic results, Prognosis, Risks and benefits of tx options, Intructions for management, Patient and family education, Importance of tx compliance, Risk factor reductions and Impressions  She will follow-up in 4 months  Relevant Medications    gabapentin (NEURONTIN) 100 mg capsule    OLANZapine (ZyPREXA) 5 mg tablet    ketorolac (TORADOL) 60 mg/2 mL IM injection 60 mg (Start on 7/20/2021  4:00 PM)             Subjective:    HPI    We had the pleasure of evaluating Brady Denver in neurological follow up  today for headaches  As you know,  she is a 52 y o   right handed female  Patient is a teacher at [employer]  Current medical illnesses:  QTc 8/17/2020 426 ms     What medications do you take or have you taken for your headaches? Preventive Medications Tried:   Vit D, CoQ10, Magnesium, B2  Amitriptyline, Lexapro  Amlodipine  Zonisamide  Botox  Topamax,   Amrix, Tizanidine,   Benacar,   Olanzapine  Abortive Medications Tried:   Rizatriptan  Prochlorperzine   Imitrex,   Zofran,   Toradol,   Dexamethasone,   rizatriptan,   olanzapine   Nurtec     She did stop Botox as she did not find it helpful any longer  She has been using Emgality in place of the Botox  She stopped amitriptyline due to weight gain  She is having weight loss surgery next week  Since stopping amitriptyline she has had an increase in migraines  She considered going to the ED last night due to the severity of the migraine  She has tried Nurtec which has not been effective in relieving the migraines  What is your current pain level ? 8/10  How often do the headaches occur? Daily  Are you ever headache free? No     Headache history with updates: Alternative therapies used in the past for headaches? Massage, chiropractor  Headache are worse if the patient: cough, sneeze, bending over, Exertion  Headache triggers:  Fatigue, Stress/tension, lack of food, heat     Aura/warning and how long does it last ? No      What time of the day do the headaches start? varies     How long do the headaches last?  Last all day       Describe your usual headache ? TTH: pressure, throbbing  Migraines: pressure, throbbing, increase in pain, stabbing     Where is your headache located? Right behind the eyes  What is the intensity of pain? TTH: 6/10  Migrianes: 8-10/10     Associated symptoms:   · Decrease of appetite, nausea, vomiting,   · Photophobia, phonophobia, sensitivity to smell   · Problem with concentration  · Decreased social functioning  · Tinnitus, light-headed or dizzy, stiff or sore neck,   · prefer to be alone and in a dark room, unable to work     Number of days missed per month because of headaches:  Work (or school) days: 0  Social or Family activities: 0     What time of the year do headaches occur more frequently? During school year  Have you seen someone else for headaches or pain? Yes, PCP  Have you had trigger point injection performed and how often? No  Have you had Botox injection performed and how often? Yes and stopped working  Have you had epidural injections or transforaminal injections performed? Yes     Have you used CBD or THC for your headaches and how often? Yes, tried CBD oil but didn't help     Are you current pregnant or planning on getting pregnant? No, hysterectomy     Have you ever had any Brain imaging? yes      2013 CT Head  Normal     12/5/2020 MRI C spine  Cervical degenerative disc disease with disc herniations and endplate/uncinate joint hypertrophic changes C4-5, C5-6 and C6-7  Stable canal stenosis and foraminal narrowing  At C2-3 and C3-4 there is right facet hypertrophic degenerative change resulting in foraminal narrowing  These changes have progressed since the prior examination with worsening right-sided foraminal narrowing at these levels  I personally reviewed these images       The following portions of the patient's history were reviewed and updated as appropriate: allergies, current medications, past family history, past medical history, past social history, past surgical history and problem list          Objective:    Weight 98 4 kg (217 lb), last menstrual period 11/18/2015, not currently breastfeeding  Vitals reviewed  Neurological Exam  Mental Status   Oriented to person, place, time and situation  Recent and remote memory are intact  Speech is normal  Language is fluent with no aphasia  Attention and concentration are normal  Fund of knowledge is appropriate for level of education  Apraxia absent  Cranial Nerves  CN II: Visual acuity is normal  Visual fields full to confrontation  CN III, IV, VI: Extraocular movements intact bilaterally  Normal lids and orbits bilaterally  Pupils equal round and reactive to light bilaterally  CN V: Facial sensation is normal   CN VII: Full and symmetric facial movement  CN VIII: Hearing is normal   CN IX, X: Palate elevates symmetrically  Normal gag reflex  CN XI: Shoulder shrug strength is normal   CN XII: Tongue midline without atrophy or fasciculations  Motor   Strength is 5/5 throughout all four extremities  Sensory  Sensation is intact to light touch, pinprick, vibration and proprioception in all four extremities  Reflexes  Deep tendon reflexes are 2+ and symmetric in all four extremities with downgoing toes bilaterally  Coordination  Finger-to-nose, rapid alternating movements and heel-to-shin normal bilaterally without dysmetria  Gait  Normal casual, toe, heel and tandem gait  ROS:    Review of Systems   Constitutional: Negative  Negative for appetite change and fever  HENT: Negative  Negative for hearing loss, tinnitus, trouble swallowing and voice change  Eyes: Negative  Negative for photophobia and pain  Respiratory: Negative  Negative for shortness of breath  Cardiovascular: Negative  Negative for palpitations  Gastrointestinal: Negative  Negative for nausea and vomiting  Endocrine: Negative  Negative for cold intolerance  Genitourinary: Negative  Negative for dysuria, frequency and urgency  Musculoskeletal: Positive for neck pain  Negative for myalgias  Skin: Negative  Negative for rash  Allergic/Immunologic: Negative  Neurological: Positive for weakness (Hands), numbness (Fingers) and headaches (Worse)  Negative for dizziness, tremors, seizures, syncope, facial asymmetry, speech difficulty and light-headedness  Hematological: Negative  Does not bruise/bleed easily  Psychiatric/Behavioral: Negative  Negative for confusion, hallucinations and sleep disturbance  All other systems reviewed and are negative  Review of systems personally reviewed

## 2021-07-20 NOTE — PROGRESS NOTES
Procedures     Pt tolerated Toradol INJ   Right Gluteus Alejandro  Pt waited 15 mins   0 Erythema  0 Induration

## 2021-09-03 DIAGNOSIS — G47.00 INSOMNIA, UNSPECIFIED TYPE: Primary | ICD-10-CM

## 2021-09-03 RX ORDER — ZOLPIDEM TARTRATE 6.25 MG/1
6.25 TABLET, FILM COATED, EXTENDED RELEASE ORAL
Qty: 30 TABLET | Refills: 0 | Status: SHIPPED | OUTPATIENT
Start: 2021-09-03 | End: 2021-10-04 | Stop reason: SDUPTHER

## 2021-09-21 DIAGNOSIS — F41.9 ANXIETY: ICD-10-CM

## 2021-09-22 RX ORDER — ESCITALOPRAM OXALATE 20 MG/1
TABLET ORAL
Qty: 30 TABLET | Refills: 3 | Status: SHIPPED | OUTPATIENT
Start: 2021-09-22 | End: 2022-01-11

## 2021-10-04 DIAGNOSIS — G47.00 INSOMNIA, UNSPECIFIED TYPE: ICD-10-CM

## 2021-10-05 RX ORDER — ZOLPIDEM TARTRATE 6.25 MG/1
6.25 TABLET, FILM COATED, EXTENDED RELEASE ORAL
Qty: 30 TABLET | Refills: 0 | Status: SHIPPED | OUTPATIENT
Start: 2021-10-05 | End: 2021-11-07 | Stop reason: SDUPTHER

## 2021-10-06 ENCOUNTER — PATIENT MESSAGE (OUTPATIENT)
Dept: NEUROLOGY | Facility: CLINIC | Age: 51
End: 2021-10-06

## 2021-10-06 DIAGNOSIS — G43.719 INTRACTABLE CHRONIC MIGRAINE WITHOUT AURA AND WITHOUT STATUS MIGRAINOSUS: Primary | ICD-10-CM

## 2021-10-07 RX ORDER — ZONISAMIDE 100 MG/1
100 CAPSULE ORAL DAILY
Qty: 90 CAPSULE | Refills: 3 | Status: SHIPPED | OUTPATIENT
Start: 2021-10-07 | End: 2022-06-27

## 2021-10-12 ENCOUNTER — TELEPHONE (OUTPATIENT)
Dept: NEUROLOGY | Facility: CLINIC | Age: 51
End: 2021-10-12

## 2021-10-12 DIAGNOSIS — G43.709 CHRONIC MIGRAINE WITHOUT AURA WITHOUT STATUS MIGRAINOSUS, NOT INTRACTABLE: ICD-10-CM

## 2021-11-07 DIAGNOSIS — G47.00 INSOMNIA, UNSPECIFIED TYPE: ICD-10-CM

## 2021-11-08 RX ORDER — ZOLPIDEM TARTRATE 6.25 MG/1
6.25 TABLET, FILM COATED, EXTENDED RELEASE ORAL
Qty: 30 TABLET | Refills: 0 | Status: SHIPPED | OUTPATIENT
Start: 2021-11-08 | End: 2021-12-06 | Stop reason: SDUPTHER

## 2021-11-23 ENCOUNTER — OFFICE VISIT (OUTPATIENT)
Dept: NEUROLOGY | Facility: CLINIC | Age: 51
End: 2021-11-23
Payer: COMMERCIAL

## 2021-11-23 VITALS
TEMPERATURE: 96.7 F | HEART RATE: 70 BPM | WEIGHT: 189.3 LBS | SYSTOLIC BLOOD PRESSURE: 130 MMHG | BODY MASS INDEX: 32.49 KG/M2 | DIASTOLIC BLOOD PRESSURE: 78 MMHG

## 2021-11-23 DIAGNOSIS — G43.709 CHRONIC MIGRAINE WITHOUT AURA WITHOUT STATUS MIGRAINOSUS, NOT INTRACTABLE: Primary | ICD-10-CM

## 2021-11-23 PROCEDURE — 1036F TOBACCO NON-USER: CPT | Performed by: PHYSICIAN ASSISTANT

## 2021-11-23 PROCEDURE — 99214 OFFICE O/P EST MOD 30 MIN: CPT | Performed by: PHYSICIAN ASSISTANT

## 2021-11-23 RX ORDER — PANTOPRAZOLE SODIUM 40 MG/1
40 TABLET, DELAYED RELEASE ORAL DAILY
COMMUNITY
Start: 2021-11-22 | End: 2022-02-28

## 2021-12-04 ENCOUNTER — IMMUNIZATIONS (OUTPATIENT)
Dept: FAMILY MEDICINE CLINIC | Facility: HOSPITAL | Age: 51
End: 2021-12-04

## 2021-12-04 PROCEDURE — 91303 COVID-19 J&J (JANSSEN) VACCINE 0.5 ML IM: CPT

## 2021-12-04 PROCEDURE — 0031A COVID-19 J&J (JANSSEN) VACCINE 0.5 ML IM: CPT

## 2021-12-06 DIAGNOSIS — G47.00 INSOMNIA, UNSPECIFIED TYPE: ICD-10-CM

## 2021-12-06 RX ORDER — ZOLPIDEM TARTRATE 6.25 MG/1
6.25 TABLET, FILM COATED, EXTENDED RELEASE ORAL
Qty: 30 TABLET | Refills: 0 | Status: SHIPPED | OUTPATIENT
Start: 2021-12-06 | End: 2022-01-04 | Stop reason: SDUPTHER

## 2021-12-20 ENCOUNTER — TELEPHONE (OUTPATIENT)
Dept: FAMILY MEDICINE CLINIC | Facility: CLINIC | Age: 51
End: 2021-12-20

## 2021-12-21 ENCOUNTER — CLINICAL SUPPORT (OUTPATIENT)
Dept: FAMILY MEDICINE CLINIC | Facility: CLINIC | Age: 51
End: 2021-12-21
Payer: COMMERCIAL

## 2021-12-21 DIAGNOSIS — Z01.30 BP CHECK: Primary | ICD-10-CM

## 2021-12-21 PROCEDURE — 99211 OFF/OP EST MAY X REQ PHY/QHP: CPT | Performed by: FAMILY MEDICINE

## 2022-01-03 DIAGNOSIS — I10 ESSENTIAL HYPERTENSION: ICD-10-CM

## 2022-01-03 RX ORDER — AMLODIPINE BESYLATE 2.5 MG/1
2.5 TABLET ORAL DAILY
Qty: 30 TABLET | Refills: 1
Start: 2022-01-03 | End: 2022-06-27 | Stop reason: ALTCHOICE

## 2022-01-04 DIAGNOSIS — G47.00 INSOMNIA, UNSPECIFIED TYPE: ICD-10-CM

## 2022-01-05 RX ORDER — ZOLPIDEM TARTRATE 6.25 MG/1
6.25 TABLET, FILM COATED, EXTENDED RELEASE ORAL
Qty: 30 TABLET | Refills: 0 | Status: SHIPPED | OUTPATIENT
Start: 2022-01-05 | End: 2022-02-03 | Stop reason: SDUPTHER

## 2022-01-05 NOTE — TELEPHONE ENCOUNTER
12/06/2021  1  12/06/2021  ZOLPIDEM TART ER 6 25 MG TAB  30 0  30  [facility]  [id number]  [facility] (2920)  0   [place]    11/08/2021  1  11/08/2021  ZOLPIDEM TART ER 6 25 MG TAB  30 0  30  [facility]  [id number]  [facility] (2920)  0   [place]    10/08/2021  1  10/05/2021  ZOLPIDEM TART ER 6 25 MG TAB  30 0  30  [facility]  [id number]  [facility] (2920)  0   [place]

## 2022-01-11 DIAGNOSIS — F41.9 ANXIETY: ICD-10-CM

## 2022-01-11 RX ORDER — ESCITALOPRAM OXALATE 20 MG/1
TABLET ORAL
Qty: 30 TABLET | Refills: 3 | Status: SHIPPED | OUTPATIENT
Start: 2022-01-11 | End: 2022-02-03 | Stop reason: SDUPTHER

## 2022-01-14 ENCOUNTER — TELEPHONE (OUTPATIENT)
Dept: FAMILY MEDICINE CLINIC | Facility: CLINIC | Age: 52
End: 2022-01-14

## 2022-01-14 NOTE — TELEPHONE ENCOUNTER
----- Message from Renetta Rubinstein, [place] sent at 1/14/2022  9:42 AM EST -----  Regarding: FW: COVID Positive    ----- Message -----  From: Yesy Johnson  Sent: 1/14/2022   9:39 AM EST  [facility]  Subject: [place] Positive                                   Dr Peggye Gottron,    Can I please have a note for work? Cecilia Ends been out this week, 1/10-1/14, and my date of return will be 1/18  Thank you!! Have a great weekend    [facility]

## 2022-01-14 NOTE — TELEPHONE ENCOUNTER
----- Message from Campbell Simms sent at 1/14/2022  9:42 AM EST -----  Regarding: FW: COVID Positive    ----- Message -----  From: Patsy Treadwell  Sent: 1/14/2022   9:39 AM EST  To: [facility]  Subject: [place] Positive                                   Dr Buenrostro,    Can I please have a note for work? Priscila Roa been out this week, 1/10-1/14, and my date of return will be 1/18  Thank you!! Have a great weekend    Karis Serrano

## 2022-01-21 ENCOUNTER — TELEPHONE (OUTPATIENT)
Dept: FAMILY MEDICINE CLINIC | Facility: CLINIC | Age: 52
End: 2022-01-21

## 2022-01-21 NOTE — TELEPHONE ENCOUNTER
----- Message from Lisseth Webb, [address] sent at 1/21/2022 11:34 AM EST -----  Regarding: FW: COVID Positive    ----- Message -----  From: Marcos Bowling  Sent: 1/21/2022  11:15 AM EST  To: [facility]  Subject: COVID Positive                                   Good morning Dr Hunt,    My rapid test was negative, and I am feeling better  Would you be able to send a letter for me to return to work on Monday 1/24? Thank you!   Braxton Rucker

## 2022-02-03 DIAGNOSIS — F41.9 ANXIETY: ICD-10-CM

## 2022-02-03 DIAGNOSIS — G47.00 INSOMNIA, UNSPECIFIED TYPE: ICD-10-CM

## 2022-02-04 RX ORDER — ESCITALOPRAM OXALATE 20 MG/1
20 TABLET ORAL DAILY
Qty: 30 TABLET | Refills: 0 | Status: SHIPPED | OUTPATIENT
Start: 2022-02-04 | End: 2022-04-03 | Stop reason: SDUPTHER

## 2022-02-04 RX ORDER — ZOLPIDEM TARTRATE 6.25 MG/1
6.25 TABLET, FILM COATED, EXTENDED RELEASE ORAL
Qty: 30 TABLET | Refills: 0 | Status: SHIPPED | OUTPATIENT
Start: 2022-02-04 | End: 2022-03-06 | Stop reason: SDUPTHER

## 2022-02-22 ENCOUNTER — RA CDI HCC (OUTPATIENT)
Dept: OTHER | Facility: HOSPITAL | Age: 52
End: 2022-02-22

## 2022-02-22 NOTE — PROGRESS NOTES
[facility]  coding opportunities       Chart reviewed, no opportunity found: CHART REVIEWED, NO OPPORTUNITY FOUND                        Patients insurance company: [employer] (Medicare Advantage and Commercial)

## 2022-02-25 NOTE — TELEPHONE ENCOUNTER
Hi!    Can we please call the patient and see if she would like to donate her Botox to our office or if she would like to come and pick it up as we cannot send it back to the pharmacy as we only have 24 hours to return Botox  Thank you!     Connie

## 2022-02-28 ENCOUNTER — OFFICE VISIT (OUTPATIENT)
Dept: FAMILY MEDICINE CLINIC | Facility: CLINIC | Age: 52
End: 2022-02-28
Payer: COMMERCIAL

## 2022-02-28 VITALS
TEMPERATURE: 97.8 F | HEART RATE: 64 BPM | WEIGHT: 152.8 LBS | OXYGEN SATURATION: 100 % | HEIGHT: 64 IN | DIASTOLIC BLOOD PRESSURE: 80 MMHG | SYSTOLIC BLOOD PRESSURE: 108 MMHG | BODY MASS INDEX: 26.09 KG/M2

## 2022-02-28 DIAGNOSIS — Z00.00 ANNUAL PHYSICAL EXAM: Primary | ICD-10-CM

## 2022-02-28 PROCEDURE — 99396 PREV VISIT EST AGE 40-64: CPT | Performed by: FAMILY MEDICINE

## 2022-02-28 PROCEDURE — 86580 TB INTRADERMAL TEST: CPT

## 2022-02-28 NOTE — PATIENT INSTRUCTIONS

## 2022-02-28 NOTE — PROGRESS NOTES
[address]    NAME: Caren Sheppard  AGE: 54 y o  SEX: female  : 1970     DATE: 2022     Assessment and Plan:     Problem List Items Addressed This Visit     None          Immunizations and preventive care screenings were discussed with patient today  Appropriate education was printed on patient's after visit summary  Counseling:  · Exercise: the importance of regular exercise/physical activity was discussed  Recommend exercise 3-5 times per week for at least 30 minutes  No follow-ups on file  Chief Complaint:     Chief Complaint   Patient presents with    Physical Exam      History of Present Illness:     Adult Annual Physical   Patient here for a comprehensive physical exam  The patient reports no problems  Diet and Physical Activity  · Diet/Nutrition: well balanced diet  · Exercise: walking  Depression Screening  PHQ-2/9 Depression Screening    Little interest or pleasure in doing things: 0 - not at all  Feeling down, depressed, or hopeless: 0 - not at all       [address] [place] [address]  · Sleep: sleeps well  · Hearing: normal - bilateral   · Vision: no vision problems  · Dental: regular dental visits  /GYN Health  · Patient is: perimenopausal  · Last menstrual period: irregular  · Contraceptive method: barrier methods  Review of Systems:     Review of Systems   Constitutional: Negative for appetite change, chills and fever  HENT: Negative for ear pain, facial swelling, rhinorrhea, sinus pain, sore throat and trouble swallowing  Eyes: Negative for discharge and redness  Respiratory: Negative for chest tightness, shortness of breath and wheezing  Cardiovascular: Negative for chest pain and palpitations  Gastrointestinal: Negative for abdominal pain, diarrhea, nausea and vomiting  Endocrine: Negative for polyuria  Genitourinary: Negative for dysuria and urgency  Musculoskeletal: Negative for arthralgias and back pain  Skin: Negative for rash  Neurological: Negative for dizziness, weakness and headaches  Hematological: Negative for adenopathy  Psychiatric/Behavioral: Negative for behavioral problems, confusion and sleep disturbance  All other systems reviewed and are negative  Past Medical History:     Past Medical History:   Diagnosis Date    Cervicalgia     disc displacement,radiculopathy    Depression     Disease of thyroid gland     Hypertension     last assessed: 8/2/2016    Migraine     Myofascial pain syndrome     Obesity (BMI 30 0-34  9)       Past Surgical History:     Past Surgical History:   Procedure Laterality Date    BREAST BIOPSY Right     biospy w/ marker    CARPAL TUNNEL RELEASE Right     GANGLION CYST EXCISION Left 8/3/2017    Procedure: WRIST/HAND MASS EXCISION;  Surgeon: Cris Adams MD;  Location:  MAIN OR;  Service: Orthopedics    HYSTERECTOMY        Social History:     Social History     Socioeconomic History    Marital status: /Civil Union     Spouse name: None    Number of children: None    Years of education: None    Highest education level: None   Occupational History    None   Tobacco Use    Smoking status: Never Smoker    Smokeless tobacco: Never Used   Vaping Use    Vaping Use: Never used   Substance and Sexual Activity    Alcohol use: No     Comment: per [employer]-drinks socially    Drug use: No    Sexual activity: None   Other Topics Concern    None   Social History Narrative    Denied coffee, cola, tea consumption per [employer]     Social Determinants of Health     Financial Resource Strain: Not on file   Food Insecurity: Not on file   Transportation Needs: Not on file   Physical Activity: Not on file   Stress: Not on file   Social Connections: Not on file   Intimate Partner Violence: Not on file   Housing Stability: Not on file      Family History:     Family History   Problem Relation Age of Onset    Hypertension Mother    [facility] Other Mother         Bladder surgery    Obesity Mother     Cancer Mother 62        abdominal cancer    Diabetes Father     Hypertension Father     Heart disease Father     Obesity Father     Leukemia Sister     Obesity Sister     No Known Problems Maternal Aunt     No Known Problems Maternal Aunt     Cancer Maternal Grandmother 79        Bladder    Dementia Maternal Grandfather     Leukemia Paternal Grandmother     No Known Problems Paternal Grandfather     No Known Problems Son     No Known Problems Son     Stroke Neg Hx       Current Medications:     Current Outpatient Medications   Medication Sig Dispense Refill    amLODIPine (NORVASC) 2 5 mg tablet Take 1 tablet (2 5 mg total) by mouth daily 30 tablet 1    B COMPLEX VITAMINS PO Take 1 tablet by mouth daily      butalbital-acetaminophen-caffeine (FIORICET,ESGIC) -40 mg per tablet Take 1 tablet by mouth every 4 (four) hours as needed for headaches 30 tablet 0    escitalopram (LEXAPRO) 20 mg tablet Take 1 tablet (20 mg total) by mouth daily 30 tablet 0    Galcanezumab-gnlm 120 MG/ML SOAJ Inject 120 mg under the skin every 30 (thirty) days 1 pen 11    levothyroxine 25 mcg tablet TAKE 1 TABLET DAILY 90 tablet 3    Multiple Vitamins-Iron (MULTIVITAMIN/IRON PO) Take by mouth 2 (two) times a day      Rimegepant Sulfate (Nurtec) 75 MG TBDP Take 75 mg by mouth as needed (migraine) 8 tablet 3    zolpidem (AMBIEN CR) 6 25 MG CR tablet Take 1 tablet (6 25 mg total) by mouth daily at bedtime as needed for sleep 30 tablet 0    zonisamide (ZONEGRAN) 100 mg capsule Take 1 capsule (100 mg total) by mouth daily 90 capsule 3     Current Facility-Administered Medications   Medication Dose Route Frequency Provider Last Rate Last Admin    prochlorperazine edisylate (COMPAZINE) injection 10 mg  10 mg Intramuscular Q4H PRN Yves Antunez PA-C   10 mg at 01/26/18 4458      Allergies:      Allergies   Allergen Reactions    Levofloxacin Anaphylaxis and Swelling     Patient reports face swelled, throat was starting to close  Physical Exam:     /80   Pulse 64   Temp 97 8 °F (36 6 °C)   Ht 5' 4" (1 626 m)   Wt 69 3 kg (152 lb 12 8 oz)   LMP 11/18/2015   SpO2 100%   BMI 26 23 kg/m²     Physical Exam  Vitals and nursing note reviewed  Constitutional:       General: She is not in acute distress  Appearance: Normal appearance  She is not ill-appearing or diaphoretic  HENT:      Head: Normocephalic and atraumatic  Right Ear: Tympanic membrane, ear canal and external ear normal       Left Ear: Tympanic membrane, ear canal and external ear normal       Nose: Nose normal  No congestion or rhinorrhea  Mouth/Throat:      Mouth: Mucous membranes are moist       Pharynx: Oropharynx is clear  No posterior oropharyngeal erythema  Eyes:      General:         Right eye: No discharge  Left eye: No discharge  Extraocular Movements: Extraocular movements intact  Conjunctiva/sclera: Conjunctivae normal       Pupils: Pupils are equal, round, and reactive to light  Neck:      Vascular: No carotid bruit  Cardiovascular:      Rate and Rhythm: Normal rate and regular rhythm  Pulses: Normal pulses  Heart sounds: Normal heart sounds  No murmur heard  Pulmonary:      Effort: Pulmonary effort is normal  No respiratory distress  Breath sounds: Normal breath sounds  No wheezing or rhonchi  Abdominal:      General: Abdomen is flat  Bowel sounds are normal  There is no distension  Palpations: There is no mass  Tenderness: There is no abdominal tenderness  Musculoskeletal:         General: No swelling or deformity  Normal range of motion  Cervical back: Normal range of motion and neck supple  No rigidity  Right lower leg: No edema  Left lower leg: No edema  Lymphadenopathy:      Cervical: No cervical adenopathy     Skin:     General: Skin is warm and dry       Capillary Refill: Capillary refill takes less than 2 seconds  Coloration: Skin is not jaundiced  Findings: No bruising, erythema or rash  Neurological:      General: No focal deficit present  Mental Status: She is alert and oriented to person, place, and time  Cranial Nerves: No cranial nerve deficit  Sensory: No sensory deficit  Gait: Gait normal       Deep Tendon Reflexes: Reflexes normal    Psychiatric:         Mood and Affect: Mood normal          Behavior: Behavior normal          Thought Content:  Thought content normal          Judgment: Judgment normal           [address]

## 2022-03-01 DIAGNOSIS — E03.9 HYPOTHYROIDISM, UNSPECIFIED TYPE: ICD-10-CM

## 2022-03-01 RX ORDER — LEVOTHYROXINE SODIUM 0.03 MG/1
TABLET ORAL
Qty: 90 TABLET | Refills: 3 | Status: SHIPPED | OUTPATIENT
Start: 2022-03-01 | End: 2022-03-06 | Stop reason: SDUPTHER

## 2022-03-02 ENCOUNTER — CLINICAL SUPPORT (OUTPATIENT)
Dept: FAMILY MEDICINE CLINIC | Facility: CLINIC | Age: 52
End: 2022-03-02

## 2022-03-02 DIAGNOSIS — Z11.1 TUBERCULOSIS SCREENING: Primary | ICD-10-CM

## 2022-03-02 PROCEDURE — RECHECK: Performed by: FAMILY MEDICINE

## 2022-03-06 DIAGNOSIS — G47.00 INSOMNIA, UNSPECIFIED TYPE: ICD-10-CM

## 2022-03-06 DIAGNOSIS — E03.9 HYPOTHYROIDISM, UNSPECIFIED TYPE: ICD-10-CM

## 2022-03-07 RX ORDER — ZOLPIDEM TARTRATE 6.25 MG/1
6.25 TABLET, FILM COATED, EXTENDED RELEASE ORAL
Qty: 30 TABLET | Refills: 0 | Status: SHIPPED | OUTPATIENT
Start: 2022-03-07 | End: 2022-04-03 | Stop reason: SDUPTHER

## 2022-03-07 RX ORDER — LEVOTHYROXINE SODIUM 0.03 MG/1
25 TABLET ORAL DAILY
Qty: 90 TABLET | Refills: 0 | Status: SHIPPED | OUTPATIENT
Start: 2022-03-07

## 2022-03-07 NOTE — TELEPHONE ENCOUNTER
1  [id number] 02/04/2022 02/04/2022 Zolpidem Tartrate 30 0 30 6 25 MG NA OLIVER BROADT  [facility] [employer]  [employer] 0 / 0 [place]    1  [id number] 01/06/2022 01/05/2022 Zolpidem Tartrate 30 0 30 6 25 MG NA OLIVER BROADT  [facility]  [employer] 0 / 0 [place]    1  [id number] 12/06/2021 12/06/2021 Zolpidem Tartrate 30 0 30 6 25 MG NA OLIVER BROAD  [email or web address]    [employer] 0 / 0 [place]

## 2022-04-03 DIAGNOSIS — G43.709 CHRONIC MIGRAINE WITHOUT AURA WITHOUT STATUS MIGRAINOSUS, NOT INTRACTABLE: ICD-10-CM

## 2022-04-03 DIAGNOSIS — F41.9 ANXIETY: ICD-10-CM

## 2022-04-03 RX ORDER — GALCANEZUMAB 120 MG/ML
INJECTION, SOLUTION SUBCUTANEOUS
Qty: 1 ML | Refills: 0 | Status: CANCELLED | OUTPATIENT
Start: 2022-04-03

## 2022-04-04 DIAGNOSIS — G43.709 CHRONIC MIGRAINE WITHOUT AURA WITHOUT STATUS MIGRAINOSUS, NOT INTRACTABLE: ICD-10-CM

## 2022-04-04 RX ORDER — GALCANEZUMAB 120 MG/ML
INJECTION, SOLUTION SUBCUTANEOUS
Qty: 1 ML | Refills: 0 | Status: CANCELLED | OUTPATIENT
Start: 2022-04-04

## 2022-04-04 RX ORDER — ESCITALOPRAM OXALATE 20 MG/1
20 TABLET ORAL DAILY
Qty: 30 TABLET | Refills: 0 | Status: SHIPPED | OUTPATIENT
Start: 2022-04-04 | End: 2022-05-05 | Stop reason: SDUPTHER

## 2022-04-04 RX ORDER — RIMEGEPANT SULFATE 75 MG/75MG
75 TABLET, ORALLY DISINTEGRATING ORAL AS NEEDED
Qty: 8 TABLET | Refills: 3 | Status: SHIPPED | OUTPATIENT
Start: 2022-04-04

## 2022-04-04 NOTE — TELEPHONE ENCOUNTER
Please review and sign script if in agreement  Last written on 3/16/21 for 8 tablets with 3 refills  Last seen in the office on 11/23/21 with next appointment scheduled for 6/7/22

## 2022-04-12 ENCOUNTER — TELEPHONE (OUTPATIENT)
Dept: NEUROLOGY | Facility: CLINIC | Age: 52
End: 2022-04-12

## 2022-04-12 DIAGNOSIS — G43.709 CHRONIC MIGRAINE WITHOUT AURA WITHOUT STATUS MIGRAINOSUS, NOT INTRACTABLE: ICD-10-CM

## 2022-04-12 RX ORDER — GALCANEZUMAB 120 MG/ML
1 INJECTION, SOLUTION SUBCUTANEOUS
Qty: 1 ML | Refills: 11 | Status: SHIPPED | OUTPATIENT
Start: 2022-04-12 | End: 2022-06-06 | Stop reason: SDUPTHER

## 2022-04-12 NOTE — TELEPHONE ENCOUNTER
[facility] called to advise PA needed for emgality      [id number]  [id number] bin  pcn cobseg  grp [id number]  ID [id number]

## 2022-04-12 NOTE — TELEPHONE ENCOUNTER
Patient of Rui Sahu, last visit     Please [facility] for [facility]ality [facility] due to insurance change  Please sign script, thanks

## 2022-04-13 ENCOUNTER — TELEPHONE (OUTPATIENT)
Dept: NEUROLOGY | Facility: CLINIC | Age: 52
End: 2022-04-13

## 2022-04-13 NOTE — TELEPHONE ENCOUNTER
Attempted to submit via CMM however unable to retrieve questions      I called pharmacy line:  [phone] and submitted verbal PA renewal    Should have determination by 4/15  req # [id number]

## 2022-04-13 NOTE — TELEPHONE ENCOUNTER
Recd request from pharmacy for renewal PA, nurtec  Patient said she just had nurtec filled; no need to work on PA for nurtec at this time

## 2022-04-14 ENCOUNTER — TELEPHONE (OUTPATIENT)
Dept: FAMILY MEDICINE CLINIC | Facility: CLINIC | Age: 52
End: 2022-04-14

## 2022-04-16 NOTE — TELEPHONE ENCOUNTER
Called pt to confirm upcoming apt in 2 weeks on 1/21/21 with Dee Dee Class    LVM on patients cell to call office   if any new/worsening symptoms to report? Asked pt if they are unable to keep apt or interested in virtual apt to please call office, also advised of no show fee  Advised we will call closer to day of apt for COVID screening 
None

## 2022-04-19 ENCOUNTER — TELEPHONE (OUTPATIENT)
Dept: NEUROLOGY | Facility: CLINIC | Age: 52
End: 2022-04-19

## 2022-04-19 NOTE — TELEPHONE ENCOUNTER
LMOM for pt to r/s her 06/07 appt with Marguerite     If pt calls please r/s next availability with Marguerite or BRODY

## 2022-04-29 DIAGNOSIS — G43.809 OTHER MIGRAINE WITHOUT STATUS MIGRAINOSUS, NOT INTRACTABLE: ICD-10-CM

## 2022-05-02 NOTE — TELEPHONE ENCOUNTER
07/06/2021 07/06/2021 Butalbital-acetaminophen-caffeine (Tablet)  30 0 5 325 MG-50 MG-40 MG NA LOVELY GRAHAM

## 2022-05-04 RX ORDER — BUTALBITAL, ACETAMINOPHEN AND CAFFEINE 50; 325; 40 MG/1; MG/1; MG/1
1 TABLET ORAL EVERY 4 HOURS PRN
Qty: 30 TABLET | Refills: 0 | Status: SHIPPED | OUTPATIENT
Start: 2022-05-04

## 2022-05-05 DIAGNOSIS — F41.9 ANXIETY: ICD-10-CM

## 2022-05-05 RX ORDER — ESCITALOPRAM OXALATE 20 MG/1
20 TABLET ORAL DAILY
Qty: 30 TABLET | Refills: 0 | Status: SHIPPED | OUTPATIENT
Start: 2022-05-05 | End: 2022-06-02 | Stop reason: SDUPTHER

## 2022-06-02 DIAGNOSIS — G47.00 INSOMNIA, UNSPECIFIED TYPE: ICD-10-CM

## 2022-06-02 DIAGNOSIS — F41.9 ANXIETY: ICD-10-CM

## 2022-06-03 RX ORDER — ESCITALOPRAM OXALATE 20 MG/1
20 TABLET ORAL DAILY
Qty: 30 TABLET | Refills: 0 | Status: SHIPPED | OUTPATIENT
Start: 2022-06-03 | End: 2022-07-02 | Stop reason: SDUPTHER

## 2022-06-03 RX ORDER — ZOLPIDEM TARTRATE 6.25 MG/1
6.25 TABLET, FILM COATED, EXTENDED RELEASE ORAL
Qty: 30 TABLET | Refills: 0 | Status: SHIPPED | OUTPATIENT
Start: 2022-06-03 | End: 2022-07-30 | Stop reason: SDUPTHER

## 2022-06-03 NOTE — TELEPHONE ENCOUNTER
04/08/2022 04/08/2022 Zolpidem Tartrate (Tablet, Extended Release)  30 0 30 6 25 MG NA LOVELY GRAHAM  [facility]

## 2022-06-04 ENCOUNTER — HOSPITAL ENCOUNTER (OUTPATIENT)
Dept: RADIOLOGY | Age: 52
Discharge: HOME | End: 2022-06-04

## 2022-06-04 DIAGNOSIS — Z12.31 ENCOUNTER FOR SCREENING MAMMOGRAM FOR MALIGNANT NEOPLASM OF BREAST: ICD-10-CM

## 2022-06-06 DIAGNOSIS — G43.709 CHRONIC MIGRAINE WITHOUT AURA WITHOUT STATUS MIGRAINOSUS, NOT INTRACTABLE: ICD-10-CM

## 2022-06-06 RX ORDER — GALCANEZUMAB 120 MG/ML
1 INJECTION, SOLUTION SUBCUTANEOUS
Qty: 3 ML | Refills: 3 | Status: SHIPPED | OUTPATIENT
Start: 2022-06-06

## 2022-06-06 NOTE — TELEPHONE ENCOUNTER
Received fax from [facility]  This pt [facility] emgality [facility] day [facility]  [facility]   Pls review and sign off      thanks

## 2022-06-14 ENCOUNTER — TELEPHONE (OUTPATIENT)
Dept: NEUROLOGY | Facility: CLINIC | Age: 52
End: 2022-06-14

## 2022-06-14 NOTE — TELEPHONE ENCOUNTER
Called and left a voicemail for patient - Please call back to confirm upcoming appointment with [facility]  Provided patient with apt date, time and location  Informed patient that check in is at least 15 minutes prior to apt time

## 2022-06-27 ENCOUNTER — OFFICE VISIT (OUTPATIENT)
Dept: NEUROLOGY | Facility: CLINIC | Age: 52
End: 2022-06-27
Payer: COMMERCIAL

## 2022-06-27 VITALS
HEIGHT: 65 IN | HEART RATE: 69 BPM | SYSTOLIC BLOOD PRESSURE: 121 MMHG | DIASTOLIC BLOOD PRESSURE: 82 MMHG | BODY MASS INDEX: 22.82 KG/M2 | TEMPERATURE: 96.8 F | WEIGHT: 137 LBS | RESPIRATION RATE: 16 BRPM

## 2022-06-27 DIAGNOSIS — G43.709 CHRONIC MIGRAINE WITHOUT AURA WITHOUT STATUS MIGRAINOSUS, NOT INTRACTABLE: Primary | ICD-10-CM

## 2022-06-27 PROCEDURE — 99213 OFFICE O/P EST LOW 20 MIN: CPT | Performed by: NURSE PRACTITIONER

## 2022-06-27 PROCEDURE — 1036F TOBACCO NON-USER: CPT | Performed by: NURSE PRACTITIONER

## 2022-06-27 PROCEDURE — 3008F BODY MASS INDEX DOCD: CPT | Performed by: NURSE PRACTITIONER

## 2022-06-27 RX ORDER — ZONISAMIDE 25 MG/1
CAPSULE ORAL
Qty: 60 CAPSULE | Refills: 0 | Status: SHIPPED | OUTPATIENT
Start: 2022-06-27 | End: 2022-07-28 | Stop reason: SDUPTHER

## 2022-06-27 NOTE — PROGRESS NOTES
Patient ID: Heydi Rodriguez is a 54 y o  female  Assessment/Plan:  Patient Instructions:  Plan to wean off the zonegran, go down to 75mg nightly for 1 week, then 50mg nightly for 1 week, and then 25mg nightly for one week and then discontinue if going well; if there is any increase in headache please let me know which dose you are on and we can prescribe the appropriate dose  Continue emgality for prevention and nurtec/very occasional fioricet for acute management  Let us know of any change  Follow up 6 months       Diagnoses and all orders for this visit:    Chronic migraine without aura without status migrainosus, not intractable  -     zonisamide (ZONEGRAN) 25 mg capsule; Take 3 capsules nightly for one week then 2 capsules nightly for one week and then 1 capsule nightly for one week and then stop  Subjective:    HPI   Since you last visit are your headaches Improved  If better or worse, please explain: thinks with weight loss (75lbs) since bariatric surgery in October 2021 her headaches have improved; she also has changed her diet  Her sleep is good recently  She has been able to come off her BP medication since losing weight  She 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 yes  Do you have any side effects? No  -she is interested in reducing medication use if possible- for review she has tried many preventatives in the past and has had improvement with emgality use; she denies need for refill at this time; she would be agreeable to reducing zonegran, this was not used for any other indication besides headache control  How often do you use abortive medications to treat a headache?  1 times per month  How effective are they? effective   She states will use fioricet first and if it doesn't go away will use the nurtec; this is helpful- we discussed possibly trying the nurtec earlier on  From 0-10, how severe is the pain for a typical headache for you? 7  What does your typical headache pain feel like? achy and pressure  Where is your typical headache? frontal  What is your current headache frequency: 1 times per month  How long does your typical headache last? 2 day(s)  In addition to the head pain, what other symptoms do you have before or during your headaches? light /dark spots in vision, light sensitivity and noise sensitivity  Do you have anything you need to discuss with the doctor during your visit? No    The following portions of the patient's history were reviewed and updated as appropriate: allergies, current medications, past family history, past medical history, past social history, past surgical history and problem list          Objective:    Blood pressure 121/82, pulse 69, temperature (!) 96 8 °F (36 °C), temperature source Temporal, resp  rate 16, height 5' 5" (1 651 m), weight 62 1 kg (137 lb), last menstrual period 11/18/2015, not currently breastfeeding  Physical Exam  Vitals reviewed  Constitutional:       General: She is not in acute distress  Appearance: She is not ill-appearing, toxic-appearing or diaphoretic  HENT:      Head: Normocephalic and atraumatic  Right Ear: External ear normal       Left Ear: External ear normal       Nose: Nose normal       Mouth/Throat:      Mouth: Mucous membranes are moist       Pharynx: Oropharynx is clear  Eyes:      General: Lids are normal          Right eye: No discharge  Left eye: No discharge  Extraocular Movements: Extraocular movements intact  Pupils: Pupils are equal, round, and reactive to light  Cardiovascular:      Rate and Rhythm: Normal rate and regular rhythm  Pulses: Normal pulses  Heart sounds: Normal heart sounds     Pulmonary:      Effort: Pulmonary effort is normal       Breath sounds: Normal breath sounds  Abdominal:      General: Abdomen is flat  There is no distension  Musculoskeletal:         General: Normal range of motion  Cervical back: Normal range of motion  No tenderness  Skin:     General: Skin is warm  Capillary Refill: Capillary refill takes less than 2 seconds  Findings: No erythema or rash  Neurological:      General: No focal deficit present  Mental Status: She is alert  Mental status is at baseline  Deep Tendon Reflexes: Strength normal and reflexes are normal and symmetric  Psychiatric:         Mood and Affect: Mood normal          Speech: Speech normal          Behavior: Behavior normal          Neurological Exam  Mental Status  Alert  Speech is normal  Language is fluent with no aphasia  Attention and concentration are normal  Fund of knowledge is appropriate for level of education  Cranial Nerves  CN II: Visual acuity is normal  Visual fields full to confrontation  CN III, IV, VI: Extraocular movements intact bilaterally  Normal lids and orbits bilaterally  Pupils equal round and reactive to light bilaterally  CN V: Facial sensation is normal   CN VII: Full and symmetric facial movement  CN VIII: Hearing is normal   CN IX, X: Palate elevates symmetrically  Normal gag reflex  CN XI: Shoulder shrug strength is normal   CN XII: Tongue midline without atrophy or fasciculations  Motor  Normal muscle bulk throughout  Normal muscle tone  No abnormal involuntary movements  Strength is 5/5 throughout all four extremities  Sensory  Light touch is normal in upper and lower extremities  Reflexes  Deep tendon reflexes are 2+ and symmetric in all four extremities  Coordination  Right: Finger-to-nose normal Left: Finger-to-nose normal     Gait  Normal casual, toe, heel and tandem gait  ROS:    Review of Systems   Constitutional: Negative  Negative for appetite change and fever  HENT: Negative    Negative for hearing loss, tinnitus, trouble swallowing and voice change  Eyes: Negative  Negative for photophobia and pain  Respiratory: Negative  Negative for shortness of breath  Cardiovascular: Negative  Negative for palpitations  Gastrointestinal: Negative  Negative for nausea and vomiting  Endocrine: Negative  Negative for cold intolerance  Genitourinary: Negative  Negative for dysuria, frequency and urgency  Musculoskeletal: Negative  Negative for myalgias and neck pain  Skin: Negative  Negative for rash  Neurological: Positive for headaches  Negative for dizziness, tremors, seizures, syncope, facial asymmetry, speech difficulty, weakness, light-headedness and numbness  Hematological: Negative  Does not bruise/bleed easily  Psychiatric/Behavioral: Negative  Negative for confusion, hallucinations and sleep disturbance     ROS was reviewed and updated as appropriate

## 2022-06-27 NOTE — PATIENT INSTRUCTIONS
Plan to wean off the zonegran, go down to 75mg nightly for 1 week, then 50mg nightly for 1 week, and then 25mg nightly for one week and then discontinue if going well; if there is any increase in headache please let me know which dose you are on and we can prescribe the appropriate dose  Continue emgality for prevention and nurtec/very occasional fioricet for acute management  Let us know of any change  Follow up 6 months

## 2022-07-02 DIAGNOSIS — F41.9 ANXIETY: ICD-10-CM

## 2022-07-05 RX ORDER — ESCITALOPRAM OXALATE 20 MG/1
20 TABLET ORAL DAILY
Qty: 30 TABLET | Refills: 0 | Status: SHIPPED | OUTPATIENT
Start: 2022-07-05 | End: 2022-08-01

## 2022-07-18 ENCOUNTER — HOSPITAL ENCOUNTER (OUTPATIENT)
Dept: RADIOLOGY | Age: 52
Discharge: HOME | End: 2022-07-18
Payer: COMMERCIAL

## 2022-07-18 VITALS — BODY MASS INDEX: 23.05 KG/M2 | WEIGHT: 135 LBS | HEIGHT: 64 IN

## 2022-07-18 PROCEDURE — 77067 SCR MAMMO BI INCL CAD: CPT

## 2022-07-18 PROCEDURE — 77063 BREAST TOMOSYNTHESIS BI: CPT

## 2022-07-28 ENCOUNTER — TELEPHONE (OUTPATIENT)
Dept: NEUROLOGY | Facility: CLINIC | Age: 52
End: 2022-07-28

## 2022-07-28 DIAGNOSIS — G43.709 CHRONIC MIGRAINE WITHOUT AURA: Primary | ICD-10-CM

## 2022-07-28 RX ORDER — ZONISAMIDE 50 MG/1
50 CAPSULE ORAL
Qty: 30 CAPSULE | Refills: 5 | Status: SHIPPED | OUTPATIENT
Start: 2022-07-28

## 2022-07-28 NOTE — TELEPHONE ENCOUNTER
Sounds like the weaning of this medication did not go as well as planned  My advice here would be to restart the zonegran- 25mg for a few days and then to 50mg  I put 50mg capsules at the pharmacy([facility] file) for you; let me know if this dose does not prevent headaches

## 2022-07-28 NOTE — TELEPHONE ENCOUNTER
Pt left a vm today at 1:54 returning our call  [phone]  Called and advised pt of all of the below  She verbalized clear understanding of all instructions

## 2022-07-28 NOTE — TELEPHONE ENCOUNTER
----- Message from Lori Nash sent at 7/28/2022  7:29 AM EDT -----  Regarding: Medication Change  Please review and assist if appropriate     ----- Message -----  From: Mayra Del Toro  Sent: 7/27/2022   5:48 PM EDT  To: [facility] [place]  Subject: Medication Change                                Last time I was in to see you, we decided to wean off Zonisamide Caps  I will be off 2 weeks on Friday  Unfortunately, I have had a persistent headache for 7 days, at times pretty intense, I am seeing auras at the moment  I have taken Nurtec twice and tried the Butalb-acetaminophen-caff as well with no relief  Please advise what I can do to stop this current headache, and what I should do moving forward to prevent this headache   (I would prefer not to take steroids if possible)    Thank you,  Layton Potts

## 2022-07-30 DIAGNOSIS — F41.9 ANXIETY: ICD-10-CM

## 2022-07-30 DIAGNOSIS — G47.00 INSOMNIA, UNSPECIFIED TYPE: ICD-10-CM

## 2022-07-30 RX ORDER — ESCITALOPRAM OXALATE 20 MG/1
20 TABLET ORAL DAILY
Qty: 30 TABLET | Refills: 0 | Status: CANCELLED | OUTPATIENT
Start: 2022-07-30

## 2022-08-01 RX ORDER — ESCITALOPRAM OXALATE 20 MG/1
TABLET ORAL
Qty: 30 TABLET | Refills: 0 | Status: SHIPPED | OUTPATIENT
Start: 2022-08-01 | End: 2022-08-30 | Stop reason: SDUPTHER

## 2022-08-01 RX ORDER — ZOLPIDEM TARTRATE 6.25 MG/1
6.25 TABLET, FILM COATED, EXTENDED RELEASE ORAL
Qty: 30 TABLET | Refills: 0 | Status: SHIPPED | OUTPATIENT
Start: 2022-08-01 | End: 2022-09-29 | Stop reason: SDUPTHER

## 2022-08-01 NOTE — TELEPHONE ENCOUNTER
06/04/2022 06/03/2022 Zolpidem Tartrate (Tablet, Extended Release)  30 0 30 6 25 MG NA OLIVER CHANEL

## 2022-08-12 ENCOUNTER — TELEPHONE (OUTPATIENT)
Dept: NEUROLOGY | Facility: CLINIC | Age: 52
End: 2022-08-12

## 2022-08-12 NOTE — TELEPHONE ENCOUNTER
----- Message from Jen Dobbs sent at 8/8/2022  7:41 AM EDT -----  Regarding: FW: Medication Change    ----- Message -----  From: Ashlee Babcock  Sent: 8/6/2022   5:45 PM EDT  To: [facility]  Subject: Medication Change                                I have been taking the 50 mg capsules for a week, still no relief from this headache  Should I increase or is there something you can prescribe to break this current headache

## 2022-08-19 NOTE — TELEPHONE ENCOUNTER
Patient left vm to f/u on her message asking if she can increase medication to help alleviate pain she has on her left side at night   [id number]

## 2022-08-22 NOTE — TELEPHONE ENCOUNTER
"CAMI Reyes  to Me    4:04 PM  Yes she can go back up to the 100mg she was on previously "    Replied to patient via [employer] message

## 2022-09-29 DIAGNOSIS — F41.9 ANXIETY: ICD-10-CM

## 2022-09-29 DIAGNOSIS — G47.00 INSOMNIA, UNSPECIFIED TYPE: ICD-10-CM

## 2022-09-30 RX ORDER — ESCITALOPRAM OXALATE 20 MG/1
20 TABLET ORAL DAILY
Qty: 30 TABLET | Refills: 0 | Status: SHIPPED | OUTPATIENT
Start: 2022-09-30

## 2022-09-30 RX ORDER — ZOLPIDEM TARTRATE 6.25 MG/1
6.25 TABLET, FILM COATED, EXTENDED RELEASE ORAL
Qty: 30 TABLET | Refills: 0 | Status: SHIPPED | OUTPATIENT
Start: 2022-09-30

## 2022-09-30 NOTE — TELEPHONE ENCOUNTER
1  [id number] 08/31/2022 08/31/2022 Butalbital-acetaminophen-caffeine (Tablet)  30 0 8 325 MG-50 MG-40 MG NA OLIVER BROADT  [facility]  [facility] 0 / 0 [place]    1  [id number] 08/01/2022 08/01/2022 Zolpidem Tartrate (Tablet, Extended Release)  30 0 30 6 25 MG NA OLIVER BROADT  [facility]  [facility] 0 / 0 [place]    1  [id number] 06/04/2022 06/03/2022 Zolpidem Tartrate (Tablet, Extended Release)  30 0 30 6 25 MG NA OLIVER BROADT  [facility]    [facility] 0 / 0 [place]

## 2022-10-29 DIAGNOSIS — F41.9 ANXIETY: ICD-10-CM

## 2022-10-31 RX ORDER — ESCITALOPRAM OXALATE 20 MG/1
20 TABLET ORAL DAILY
Qty: 30 TABLET | Refills: 0 | Status: SHIPPED | OUTPATIENT
Start: 2022-10-31

## 2022-11-22 DIAGNOSIS — G43.709 CHRONIC MIGRAINE WITHOUT AURA WITHOUT STATUS MIGRAINOSUS, NOT INTRACTABLE: Primary | ICD-10-CM

## 2022-11-22 RX ORDER — ZONISAMIDE 100 MG/1
100 CAPSULE ORAL
Qty: 90 CAPSULE | Refills: 2 | Status: SHIPPED | OUTPATIENT
Start: 2022-11-22

## 2022-11-22 NOTE — TELEPHONE ENCOUNTER
----- Message from Mirna Allen sent at 11/22/2022  8:00 AM EST -----  Regarding: FW: Prescription Adjustment  Contact: [phone]    ----- Message -----  From: Vasile Valentine  Sent: 11/21/2022   6:30 PM EST  To: [facility] [place] [facility]  Subject: Prescription Adjustment                          Last time I was in, we tried reducing my mg of Zonisamide  It didn’t work and I returned to 100mg  I am almost out of my supply with no refills  Can you please send a refill of Zonisamide 100mg, 90 day supply to [facility]?     Thank you,  Shruthi Lemus

## 2022-11-24 DIAGNOSIS — G43.709 CHRONIC MIGRAINE WITHOUT AURA WITHOUT STATUS MIGRAINOSUS, NOT INTRACTABLE: ICD-10-CM

## 2022-11-24 RX ORDER — ZONISAMIDE 100 MG/1
100 CAPSULE ORAL
Qty: 90 CAPSULE | Refills: 0 | Status: CANCELLED | OUTPATIENT
Start: 2022-11-24

## 2022-12-06 ENCOUNTER — OFFICE VISIT (OUTPATIENT)
Dept: FAMILY MEDICINE CLINIC | Facility: CLINIC | Age: 52
End: 2022-12-06

## 2022-12-06 VITALS
OXYGEN SATURATION: 98 % | WEIGHT: 140.2 LBS | BODY MASS INDEX: 23.93 KG/M2 | HEIGHT: 64 IN | DIASTOLIC BLOOD PRESSURE: 94 MMHG | HEART RATE: 64 BPM | RESPIRATION RATE: 18 BRPM | SYSTOLIC BLOOD PRESSURE: 138 MMHG | TEMPERATURE: 97.5 F

## 2022-12-06 DIAGNOSIS — I10 ESSENTIAL HYPERTENSION: Primary | ICD-10-CM

## 2022-12-06 RX ORDER — LISINOPRIL 10 MG/1
10 TABLET ORAL DAILY
Qty: 30 TABLET | Refills: 3 | Status: SHIPPED | OUTPATIENT
Start: 2022-12-06 | End: 2023-01-05

## 2022-12-07 NOTE — PROGRESS NOTES
Patient ID: Benji Miller is a 50 y o  female  HPI: 50 y  o female presents for follow up for new onset hypertension  She was on bp meds in past and went off them, but last few mos her bp has been elevated  Pt denies any dizziness, blurred vision, headache,  chest pain, palpitations, or dypsnea with exertion        SUBJECTIVE    Family History   Problem Relation Age of Onset   • Hypertension Mother    • Other Mother         Bladder surgery   • Obesity Mother    • Cancer Mother 62        abdominal cancer   • Diabetes Father    • Hypertension Father    • Heart disease Father    • Obesity Father    • Leukemia Sister    • Obesity Sister    • No Known Problems Maternal Aunt    • No Known Problems Maternal Aunt    • Cancer Maternal Grandmother 79        Bladder   • Dementia Maternal Grandfather    • Leukemia Paternal Grandmother    • No Known Problems Paternal Grandfather    • No Known Problems Son    • No Known Problems Son    • Stroke Neg Hx      Social History     Socioeconomic History   • Marital status: /Civil Union     Spouse name: Not on file   • Number of children: Not on file   • Years of education: Not on file   • Highest education level: Not on file   Occupational History   • Not on file   Tobacco Use   • Smoking status: Never   • Smokeless tobacco: Never   Vaping Use   • Vaping Use: Never used   Substance and Sexual Activity   • Alcohol use: No     Comment: per [employer]-drinks socially   • Drug use: No   • Sexual activity: Not on file   Other Topics Concern   • Not on file   Social History Narrative    Denied coffee, cola, tea consumption per [employer]     Social Determinants of Health     Financial Resource Strain: Not on file   Food Insecurity: Not on file   Transportation Needs: Not on file   Physical Activity: Not on file   Stress: Not on file   Social Connections: Not on file   Intimate Partner Violence: Not on file   Housing Stability: Not on file     Past Medical History:   Diagnosis Date   • Cervicalgia     disc displacement,radiculopathy   • Depression    • Disease of thyroid gland    • Hypertension     last assessed: 8/2/2016   • Migraine    • Myofascial pain syndrome    • Obesity (BMI 30 0-34  9)      Past Surgical History:   Procedure Laterality Date   • BREAST BIOPSY Right     biospy w/ marker   • CARPAL TUNNEL RELEASE Right    • GANGLION CYST EXCISION Left 08/03/2017    Procedure: WRIST/HAND MASS EXCISION;  Surgeon: Kaykay Mccallum MD;  Location: [facility];  Service: Orthopedics   • GASTRECTOMY SLEEVE LAPAROSCOPIC     • HYSTERECTOMY       Allergies   Allergen Reactions   • Levofloxacin Anaphylaxis and Swelling     Patient reports face swelled, throat was starting to close         Current Outpatient Medications:   •  B COMPLEX VITAMINS PO, Take 1 tablet by mouth daily, Disp: , Rfl:   •  butalbital-acetaminophen-caffeine (FIORICET,ESGIC) -40 mg per tablet, Take 1 tablet by mouth every 4 (four) hours as needed for headaches, Disp: 30 tablet, Rfl: 0  •  escitalopram (LEXAPRO) 20 mg tablet, Take 1 tablet (20 mg total) by mouth daily, Disp: 30 tablet, Rfl: 0  •  Galcanezumab-gnlm (Emgality) 120 MG/ML SOAJ, Inject 1 mL under the skin every 30 (thirty) days, Disp: 3 mL, Rfl: 3  •  levothyroxine 25 mcg tablet, Take 1 tablet (25 mcg total) by mouth daily, Disp: 90 tablet, Rfl: 0  •  lisinopril (ZESTRIL) 10 mg tablet, Take 1 tablet (10 mg total) by mouth daily, Disp: 30 tablet, Rfl: 3  •  Multiple Vitamins-Iron (MULTIVITAMIN/IRON PO), Take by mouth 2 (two) times a day, Disp: , Rfl:   •  Rimegepant Sulfate (Nurtec) 75 MG TBDP, Take 75 mg by mouth as needed (migraine), Disp: 8 tablet, Rfl: 4  •  zolpidem (AMBIEN CR) 6 25 MG CR tablet, Take 1 tablet (6 25 mg total) by mouth daily at bedtime as needed for sleep, Disp: 30 tablet, Rfl: 0  •  zonisamide (Zonegran) 100 mg capsule, Take 1 capsule (100 mg total) by mouth daily at bedtime, Disp: 90 capsule, Rfl: 2    Current Facility-Administered Medications:   •  prochlorperazine edisylate (COMPAZINE) injection 10 mg, 10 mg, Intramuscular, Q4H PRN, Ephriam FIDEL Randhawa, 10 mg at 01/26/18 1517    Review of Systems  Constitutional:     Denies fever, chills ,fatigue ,weakness ,weight loss, weight gain     ENT: Denies earache ,loss of hearing ,nosebleed, nasal discharge,nasal congestion ,sore throat ,hoarseness  Pulmonary: Denies shortness of breath ,cough  ,dyspnea on exertion, orthopnea  ,PND   Cardiovascular:  Denies bradycardia , tachycardia  ,palpations, lower extremity edema leg, claudication  Breast:  Denies new or changing breast lumps ,nipple discharge ,nipple changes  Abdomen:  Denies abdominal pain , anorexia , indigestion, nausea, vomiting, constipation, diarrhea  Musculoskeletal: Denies myalgias, arthralgias, joint swelling, joint stiffness , limb pain, limb swelling  Gu: denies dysuria, polyuria  Skin: Denies skin rash, skin lesion, skin wound, itching, dry skin  Neuro: Denies headache, numbness, tingling, confusion, loss of consciousness, dizziness, vertigo  Psychiatric: Denies feelings of depression, suicidal ideation, anxiety, sleep disturbances    OBJECTIVE  /94 (BP Location: Right arm, Patient Position: Sitting, Cuff Size: Standard)   Pulse 64   Temp 97 5 °F (36 4 °C) (Temporal)   Resp 18   Ht 5' 4" (1 626 m)   Wt 63 6 kg (140 lb 3 2 oz)   LMP 11/18/2015   SpO2 98%   BMI 24 07 kg/m²   Constitutional:   NAD, well appearing and well nourished      ENT:   Conjunctiva and lids: no injection, edema, or discharge     Pupils and iris: CHELSEA bilaterally    External inspection of ears and nose: normal without deformities or discharge  Otoscopic exam: Canals patent without erythema  Nasal mucosa, septum and turbinates: Normal or edema or discharge         Oropharynx:  Moist mucosa, normal tongue and tonsils without lesions  No erythema        Pulmonary:Respiratory effort normal rate and rhythm, no increased work of breathing  Auscultation of lungs:  Clear bilaterally with no adventitious breath sounds       Cardiovascular: regular rate and rhythm, S1 and S2, no murmur, no edema and/or varicosities of LE      Abdomen: Soft and non-distended     Positive bowel sounds      No heptomegaly or splenomegaly      Gu: no suprapubic tenderness or CVA tenderness, no urethral discharge  Lymphatic:  No anterior or posterior cervical lymphadenopathy         Musculoskeletal:  Gait and station: Normal gait      Digits and nails normal without clubbing or cyanosis       Inspection/palpation of joints, bones, and muscles:  No joint tenderness, swelling, full active and passive range of motion       Skin: Normal skin turgor and no rashes      Neuro:      Normal reflexes     Psych:   alert and oriented to person, place and time     normal mood and affect       Assessment/Plan:Diagnoses and all orders for this visit:    Essential hypertension  Comments:  REstart ACE tx and recheck bp in 2 weeks  Orders:  -     lisinopril (ZESTRIL) 10 mg tablet; Take 1 tablet (10 mg total) by mouth daily        Reviewed with patient plan to treat with above plan      Patient instructed to call in 72 hours if not feeling better or if symptoms worse

## 2022-12-14 ENCOUNTER — OFFICE VISIT (OUTPATIENT)
Dept: FAMILY MEDICINE CLINIC | Facility: CLINIC | Age: 52
End: 2022-12-14

## 2022-12-14 VITALS
SYSTOLIC BLOOD PRESSURE: 124 MMHG | WEIGHT: 136.6 LBS | HEIGHT: 64 IN | HEART RATE: 84 BPM | RESPIRATION RATE: 17 BRPM | DIASTOLIC BLOOD PRESSURE: 82 MMHG | TEMPERATURE: 97.3 F | OXYGEN SATURATION: 97 % | BODY MASS INDEX: 23.32 KG/M2

## 2022-12-14 DIAGNOSIS — J98.8 VIRAL RESPIRATORY ILLNESS: ICD-10-CM

## 2022-12-14 DIAGNOSIS — J11.1 INFLUENZA: Primary | ICD-10-CM

## 2022-12-14 DIAGNOSIS — B97.89 VIRAL RESPIRATORY ILLNESS: ICD-10-CM

## 2022-12-14 RX ORDER — OSELTAMIVIR PHOSPHATE 75 MG/1
75 CAPSULE ORAL EVERY 12 HOURS SCHEDULED
Qty: 10 CAPSULE | Refills: 0 | Status: SHIPPED | OUTPATIENT
Start: 2022-12-14 | End: 2022-12-19

## 2022-12-14 RX ORDER — BENZONATATE 200 MG/1
200 CAPSULE ORAL 3 TIMES DAILY PRN
Qty: 30 CAPSULE | Refills: 0 | Status: SHIPPED | OUTPATIENT
Start: 2022-12-14

## 2022-12-14 NOTE — PROGRESS NOTES
Patient ID: Austin Casas is a 50 y o  female  HPI: 50 y  o female presenting with 1 day history of sore throat, nasal congestion, ear pain,pnd and cough  Pt also has  fever, chills,diarrhea intermittently and  body aches  Her coworker has influenza        SUBJECTIVE    Family History   Problem Relation Age of Onset   • Hypertension Mother    • Other Mother         Bladder surgery   • Obesity Mother    • Cancer Mother 62        abdominal cancer   • Colon cancer Mother    • Diabetes Father    • Hypertension Father    • Heart disease Father    • Obesity Father    • Leukemia Sister    • Obesity Sister    • No Known Problems Maternal Aunt    • No Known Problems Maternal Aunt    • Cancer Maternal Grandmother         Bladder   • Dementia Maternal Grandfather    • Leukemia Paternal Grandmother    • No Known Problems Paternal Grandfather    • No Known Problems Son    • No Known Problems Son    • Stroke Neg Hx      Social History     Socioeconomic History   • Marital status: /Civil Union     Spouse name: Not on file   • Number of children: Not on file   • Years of education: Not on file   • Highest education level: Not on file   Occupational History   • Not on file   Tobacco Use   • Smoking status: Never   • Smokeless tobacco: Never   Vaping Use   • Vaping Use: Never used   Substance and Sexual Activity   • Alcohol use: No     Comment: per [employer]-drinks socially   • Drug use: No   • Sexual activity: Yes     Partners: Male     Birth control/protection: Female Sterilization   Other Topics Concern   • Not on file   Social History Narrative    Denied coffee, cola, tea consumption per [employer]     Social Determinants of Health     Financial Resource Strain: Not on file   Food Insecurity: Not on file   Transportation Needs: Not on file   Physical Activity: Not on file   Stress: Not on file   Social Connections: Not on file   Intimate Partner Violence: Not on file   Housing Stability: Not on file     Past Medical History:   Diagnosis Date   • Anxiety    • Cervicalgia     disc displacement,radiculopathy   • Depression    • Disease of thyroid gland    • Headache(784 0)    • HL (hearing loss)    • Hypertension     last assessed: 8/2/2016   • Kidney stone 3/2021   • Migraine    • Myofascial pain syndrome    • Obesity    • Obesity (BMI 30 0-34  9)      Past Surgical History:   Procedure Laterality Date   • BREAST BIOPSY Right     biospy w/ marker   • CARPAL TUNNEL RELEASE Right    • GANGLION CYST EXCISION Left 08/03/2017    Procedure: WRIST/HAND MASS EXCISION;  Surgeon: Barrett Dubose MD;  Location: [facility];  Service: Orthopedics   • GASTRECTOMY SLEEVE LAPAROSCOPIC     • HYSTERECTOMY       Allergies   Allergen Reactions   • Levofloxacin Anaphylaxis and Swelling     Patient reports face swelled, throat was starting to close         Current Outpatient Medications:   •  B COMPLEX VITAMINS PO, Take 1 tablet by mouth daily, Disp: , Rfl:   •  benzonatate (TESSALON) 200 MG capsule, Take 1 capsule (200 mg total) by mouth 3 (three) times a day as needed for cough, Disp: 30 capsule, Rfl: 0  •  butalbital-acetaminophen-caffeine (FIORICET,ESGIC) -40 mg per tablet, Take 1 tablet by mouth every 4 (four) hours as needed for headaches, Disp: 30 tablet, Rfl: 0  •  escitalopram (LEXAPRO) 20 mg tablet, Take 1 tablet (20 mg total) by mouth daily, Disp: 30 tablet, Rfl: 0  •  Galcanezumab-gnlm (Emgality) 120 MG/ML SOAJ, Inject 1 mL under the skin every 30 (thirty) days, Disp: 3 mL, Rfl: 3  •  levothyroxine 25 mcg tablet, Take 1 tablet (25 mcg total) by mouth daily, Disp: 90 tablet, Rfl: 0  •  lisinopril (ZESTRIL) 10 mg tablet, Take 1 tablet (10 mg total) by mouth daily, Disp: 30 tablet, Rfl: 3  •  Multiple Vitamins-Iron (MULTIVITAMIN/IRON PO), Take by mouth 2 (two) times a day, Disp: , Rfl:   •  oseltamivir (TAMIFLU) 75 mg capsule, Take 1 capsule (75 mg total) by mouth every 12 (twelve) hours for 5 days, Disp: 10 capsule, Rfl: 0  • Rimegepant Sulfate (Nurtec) 75 MG TBDP, Take 75 mg by mouth as needed (migraine), Disp: 8 tablet, Rfl: 4  •  zolpidem (AMBIEN CR) 6 25 MG CR tablet, Take 1 tablet (6 25 mg total) by mouth daily at bedtime as needed for sleep, Disp: 30 tablet, Rfl: 0  •  zonisamide (Zonegran) 100 mg capsule, Take 1 capsule (100 mg total) by mouth daily at bedtime, Disp: 90 capsule, Rfl: 2    Current Facility-Administered Medications:   •  prochlorperazine edisylate (COMPAZINE) injection 10 mg, 10 mg, Intramuscular, Q4H PRN, Hira Campuzano PA-C, 10 mg at 01/26/18 1517    Review of Systems  Constitutional:     + fever, +chills ,no fatigue ,weakness ,weight loss,or  weight gain     ENT: Denies loss of hearing ,nosebleed, nasal discharge ,hoarseness; but admits to nasal congestion , sore throat and ear pain      Pulmonary: Denies shortness of breath ,dyspnea on exertion, orthopnea ; but admits to cough and pnd  Cardiovascular:  Denies bradycardia , tachycardia  ,palpations, lower extremity edema leg, claudication  Breast:  Denies new or changing breast lumps ,nipple discharge ,nipple changes  Abdomen:  Denies abdominal pain , anorexia , indigestion, nausea, vomiting, constipation,+ diarrhea  Musculoskeletal:+ body aches;  Denies  arthralgias, joint swelling, joint stiffness , limb pain, limb swelling  Lymph: + swollen glands  Gu: Denies polyuria or dysuria  Skin: Denies skin rash, skin lesion, skin wound, itching, dry skin  Neuro: Denies headache, numbness, tingling, confusion, loss of consciousness, dizziness, vertigo  Psychiatric: Denies feelings of depression, suicidal ideation, anxiety, sleep disturbances    OBJECTIVE  /82 (BP Location: Right arm, Patient Position: Sitting, Cuff Size: Standard)   Pulse 84   Temp (!) 97 3 °F (36 3 °C) (Temporal)   Resp 17   Ht 5' 4" (1 626 m)   Wt 62 kg (136 lb 9 6 oz)   LMP 11/18/2015   SpO2 97%   BMI 23 45 kg/m²   Constitutional:   NAD, well appearing and well nourished     ENT: Conjunctiva and lids: no injection, edema, or discharge    Pupils and iris: CHELSEA bilaterally  External inspection of ears and nose: normal without deformities or discharge  Otoscopic exam: Canals patent without erythema, tm dull and erythematous, effusions bilaterally   Nasal mucosa, septum and turbinates: + turbinate injection, nasal discharge         Oropharynx:  Moist mucosa, normal tongue and tonsils without lesions  + erythema and injection of posterior pharynx with pnd    Pulmonary:Respiratory effort normal rate and rhythm, no increased work of breathing  Auscultation of lungs:  Clear bilaterally with no adventitious breath sounds     Cardiovascular: regular rate and rhythm, S1 and S2, no murmur, no edema and/or varicosities of LE     Abdomen: Soft and nontender with + bowel sounds  No heptomegaly or splenomegaly     Gu: no suprapubic tenderness or CVA tenderness  Lymphatic: + anterior  cervical lymphadenopathy      Musculoskeletal:  Gait and station: Normal gait      Digits and nails normal without clubbing or cyanosis      Inspection/palpation of joints, bones, and muscles:  No joint tenderness, swelling, full active and passive range of motion       Skin: Normal skin turgor and no rashes      Neuro:    Normal reflexes  Pych:   alert and oriented to person, place and time     normal mood and affect      Assessment/Plan:Diagnoses and all orders for this visit:    Influenza  -     oseltamivir (TAMIFLU) 75 mg capsule; Take 1 capsule (75 mg total) by mouth every 12 (twelve) hours for 5 days  -     benzonatate (TESSALON) 200 MG capsule; Take 1 capsule (200 mg total) by mouth 3 (three) times a day as needed for cough    Viral respiratory illness  -     Covid/Flu- Office Collect        Reviewed with patient plan to treat with above plan      Patient instructed to call in 72 hours if not feeling better or if symptoms worsen

## 2022-12-15 LAB
FLUAV RNA RESP QL NAA+PROBE: POSITIVE
FLUBV RNA RESP QL NAA+PROBE: NEGATIVE
SARS-COV-2 RNA RESP QL NAA+PROBE: NEGATIVE

## 2022-12-20 ENCOUNTER — TELEPHONE (OUTPATIENT)
Dept: NEUROLOGY | Facility: CLINIC | Age: 52
End: 2022-12-20

## 2022-12-20 NOTE — TELEPHONE ENCOUNTER
Called and spoke to patient - confirmed upcoming appointment with Girma Goss on 12/27/22 1:00 pm at the [facility] office  Provided patient with apt date, time and location  Informed patient that check in is at least 15 minutes prior to apt time  The patient is not  having any issues or concerns at this time

## 2022-12-20 NOTE — TELEPHONE ENCOUNTER
Recd fax from [employer] that emgality needs PA, [phone]    Last visit with Martin Enrique on 6/27/2022

## 2022-12-26 DIAGNOSIS — F41.9 ANXIETY: ICD-10-CM

## 2022-12-26 DIAGNOSIS — I10 ESSENTIAL HYPERTENSION: ICD-10-CM

## 2022-12-27 ENCOUNTER — OFFICE VISIT (OUTPATIENT)
Dept: NEUROLOGY | Facility: CLINIC | Age: 52
End: 2022-12-27

## 2022-12-27 VITALS
HEIGHT: 65 IN | SYSTOLIC BLOOD PRESSURE: 137 MMHG | DIASTOLIC BLOOD PRESSURE: 81 MMHG | HEART RATE: 61 BPM | RESPIRATION RATE: 16 BRPM | BODY MASS INDEX: 22.82 KG/M2 | WEIGHT: 137 LBS | TEMPERATURE: 96 F

## 2022-12-27 DIAGNOSIS — G43.709 CHRONIC MIGRAINE WITHOUT AURA WITHOUT STATUS MIGRAINOSUS, NOT INTRACTABLE: Primary | ICD-10-CM

## 2022-12-27 RX ORDER — ESCITALOPRAM OXALATE 20 MG/1
20 TABLET ORAL DAILY
Qty: 90 TABLET | Refills: 1 | Status: SHIPPED | OUTPATIENT
Start: 2022-12-27

## 2022-12-27 RX ORDER — LISINOPRIL 10 MG/1
10 TABLET ORAL DAILY
Qty: 90 TABLET | Refills: 1 | Status: SHIPPED | OUTPATIENT
Start: 2022-12-27 | End: 2023-01-26

## 2022-12-27 NOTE — PROGRESS NOTES
Patient ID: Maurilio Nino is a 50 y o  female  Assessment/Plan:  Patient Instructions:  Continue zonegran daily  and emgality (we will check on the prior auth so you can get the next injection as soon as possible) monthly for migraine prevention and as needed fioricet for acute management (let me know when you need a refill)  Consider acupuncture  Continue with plans for repeat lab work  Follow up in 6 months      1  Chronic migraine without aura without status migrainosus, not intractable              Subjective:  Yadira Young is a 50year old female with past medical history of hypothyroidism, anxiety, obesity s/p bariatric surgery, hypertension, insomnia on low dose ambien, who presents for 6 month migraine follow up  At last visit she was doing well and therefore we attempted a zonegran wean  She did not do well with this-headaches returned (see telephone encounters)  She has resumed 100mg nightly zonegran use and has continued monthly emgality injections (due 12/28 for a shot) for migraine prevention and she states she uses fioricet for acute management (not often)  She does not find nurtec helpful  She recently had Flu a but states she is recovering well  Her headaches have not changed in presentation-usually frontal L>R and triggered by weather changes  No recent labs to review; she states she is going to complete these soon  HPI  Since your last visit are your headaches Remained the Same  Are you taking your current medications as prescribed? yes  Do you have any side effects? no  How often do you use abortive medications to treat a headache? 1 times per Couple of months    How effective are they? effective  From 0-10, how severe is the pain for a typical headache for you? 10  What does your typical headache pain feel like? pressure  Where is your typical headache? frontal  What is your current headache frequency: 2-3 times per month  How long does your typical headache last? 2-3 day(s)  In addition to the head pain, what other symptoms do you have before or during your headaches? light /dark spots in vision  Do you have anything you need to discuss with the doctor during your visit? No    Previous History:  Current medical illnesses:  QTc 8/17/2020 426 ms     What medications do you take or have you taken for your headaches? Preventive Medications Tried:   Vit D, CoQ10, Magnesium, B2  Amitriptyline, Lexapro  Amlodipine  Zonisamide  Botox  Topamax,   Amrix, Tizanidine,   Benacar,   Olanzapine  Emgality  Abortive Medications Tried:   Rizatriptan  Prochlorperzine   Imitrex,   Zofran,   Toradol,   Dexamethasone,   rizatriptan,   olanzapine   Nurtec    Headache history with updates: Alternative therapies used in the past for headaches? Massage, chiropractor  Headache are worse if the patient: cough, sneeze, bending over, Exertion  Headache triggers:  Fatigue, Stress/tension, lack of food, heat     Aura/warning and how long does it last ? No      What time of the day do the headaches start? varies     How long do the headaches last? Last a few hours      Describe your usual headache ? TTH: pressure, throbbing  Migraines: pressure, throbbing, increase in pain, stabbing     Where is your headache located?  Right behind the eyes      What is the intensity of pain?   TTH: 6/10  Migraines: 8-10/10     Associated symptoms:   • Decrease of appetite, nausea, vomiting,   • Photophobia, phonophobia, sensitivity to smell   • Problem with concentration  • Decreased social functioning  • Tinnitus, light-headed or dizzy, stiff or sore neck,   • prefer to be alone and in a dark room, unable to work    What time of the year do headaches occur more frequently? During school year  Have you seen someone else for headaches or pain? Yes, PCP  Have you had trigger point injection performed and how often? No  Have you had Botox injection performed and how often? Yes and stopped working    Have you had epidural injections or transforaminal injections performed? Yes  Have you used CBD or THC for your headaches and how often? Yes, tried CBD oil but didn't help  Are you current pregnant or planning on getting pregnant? No, hysterectomy  Have you ever had any Brain imaging? yes     2013 CT Head  Normal     12/5/2020 MRI C spine  Cervical degenerative disc disease with disc herniations and endplate/uncinate joint hypertrophic changes C4-5, C5-6 and C6-7   Stable canal stenosis and foraminal narrowing  At C2-3 and C3-4 there is right facet hypertrophic degenerative change resulting in foraminal narrowing  These changes have progressed since the prior examination with worsening right-sided foraminal narrowing at these levels  The following portions of the patient's history were reviewed and updated as appropriate: allergies, current medications, past family history, past medical history, past social history, past surgical history and problem list          Objective:    Blood pressure 137/81, pulse 61, temperature (!) 96 °F (35 6 °C), temperature source Temporal, resp  rate 16, height 5' 5" (1 651 m), weight 62 1 kg (137 lb), last menstrual period 11/18/2015, not currently breastfeeding  Physical Exam  Vitals reviewed  Constitutional:       General: She is not in acute distress  Appearance: She is normal weight  HENT:      Head: Normocephalic and atraumatic  Right Ear: External ear normal       Left Ear: External ear normal       Nose: Nose normal       Mouth/Throat:      Mouth: Mucous membranes are moist       Pharynx: Oropharynx is clear  Eyes:      General: Lids are normal       Extraocular Movements: Extraocular movements intact  Pupils: Pupils are equal, round, and reactive to light  Cardiovascular:      Rate and Rhythm: Normal rate and regular rhythm  Pulses: Normal pulses  Heart sounds: Normal heart sounds     Pulmonary:      Effort: Pulmonary effort is normal       Breath sounds: Normal breath sounds  Abdominal:      General: Bowel sounds are normal  There is no distension  Musculoskeletal:         General: Normal range of motion  Cervical back: Normal range of motion  Skin:     General: Skin is warm and dry  Capillary Refill: Capillary refill takes less than 2 seconds  Findings: No rash  Neurological:      General: No focal deficit present  Mental Status: She is alert  Mental status is at baseline  Motor: Motor strength is normal       Deep Tendon Reflexes: Reflexes are normal and symmetric  Psychiatric:         Mood and Affect: Mood normal          Speech: Speech normal          Behavior: Behavior normal          Neurological Exam  Mental Status  Alert  Speech is normal  Language is fluent with no aphasia  Attention and concentration are normal  Fund of knowledge is appropriate for level of education  Cranial Nerves  CN II: Visual acuity is normal  Visual fields full to confrontation  CN III, IV, VI: Extraocular movements intact bilaterally  Normal lids and orbits bilaterally  Pupils equal round and reactive to light bilaterally  CN V: Facial sensation is normal   CN VII: Full and symmetric facial movement  CN VIII: Hearing is normal   CN IX, X: Palate elevates symmetrically  Normal gag reflex  CN XI: Shoulder shrug strength is normal   CN XII: Tongue midline without atrophy or fasciculations  Motor  Normal muscle bulk throughout  Normal muscle tone  No abnormal involuntary movements  Strength is 5/5 throughout all four extremities  Sensory  Light touch is normal in upper and lower extremities  Reflexes  Deep tendon reflexes are 2+ and symmetric in all four extremities  Coordination  Right: Finger-to-nose normal Left: Finger-to-nose normal     Gait  Normal casual, toe, heel and tandem gait  ROS:    Review of Systems   Constitutional: Negative  Negative for appetite change and fever  HENT: Negative    Negative for hearing loss, tinnitus, trouble swallowing and voice change  Eyes: Negative  Negative for photophobia, pain and visual disturbance  Respiratory: Negative  Negative for shortness of breath  Cardiovascular: Negative  Negative for palpitations  Gastrointestinal: Negative  Negative for nausea and vomiting  Endocrine: Negative  Negative for cold intolerance  Genitourinary: Negative  Negative for dysuria, frequency and urgency  Musculoskeletal: Negative  Negative for gait problem, myalgias and neck pain  Skin: Negative  Negative for rash  Allergic/Immunologic: Negative  Neurological: Negative  Negative for dizziness, tremors, seizures, syncope, facial asymmetry, speech difficulty, weakness, light-headedness, numbness and headaches  Hematological: Negative  Does not bruise/bleed easily  Psychiatric/Behavioral: Negative  Negative for confusion, hallucinations and sleep disturbance     ROS was reviewed and updated as appropriate

## 2022-12-27 NOTE — TELEPHONE ENCOUNTER
Called insurance [phone], spoke with representative    Emgality PA pending review     Determination by tomorrow 12/28

## 2022-12-27 NOTE — PATIENT INSTRUCTIONS
Continue zonegran daily  and emgality (we will check on the prior auth so you can get the next injection as soon as possible) monthly for migraine prevention and as needed fioricet for acute management (let me know when you need a refill)  Consider acupuncture    Continue with plans for repeat lab work  Follow up in 6

## 2022-12-28 NOTE — TELEPHONE ENCOUNTER
[employer] approved, no approval dates listed online but will await determination letter     Sent patient MyChart message

## 2023-02-02 DIAGNOSIS — G47.00 INSOMNIA, UNSPECIFIED TYPE: ICD-10-CM

## 2023-02-03 RX ORDER — ZOLPIDEM TARTRATE 6.25 MG/1
6.25 TABLET, FILM COATED, EXTENDED RELEASE ORAL
Qty: 30 TABLET | Refills: 0 | Status: SHIPPED | OUTPATIENT
Start: 2023-02-03

## 2023-02-24 DIAGNOSIS — E03.9 HYPOTHYROIDISM, UNSPECIFIED TYPE: ICD-10-CM

## 2023-02-24 RX ORDER — LEVOTHYROXINE SODIUM 0.03 MG/1
TABLET ORAL
Qty: 90 TABLET | Refills: 3 | Status: SHIPPED | OUTPATIENT
Start: 2023-02-24

## 2023-03-01 ENCOUNTER — TELEPHONE (OUTPATIENT)
Dept: ADMINISTRATIVE | Facility: OTHER | Age: 53
End: 2023-03-01

## 2023-03-01 NOTE — TELEPHONE ENCOUNTER
----- Message from Desiree Castro, [address] sent at 3/1/2023 11:28 AM EST -----  Regarding: care gap request  03/01/23 11:28 AM    Hello, our patient attached above has had CRC: Colonoscopy completed/performed  Please assist in updating the patient chart by pulling the document from the Media Tab  The date of service is 2017       Thank you,  Desiree Castro  [facility] Oneal

## 2023-03-01 NOTE — TELEPHONE ENCOUNTER
Upon review of the In Basket request we were able to locate, review, and update the patient chart as requested for CRC: Colonoscopy  Any additional questions or concerns should be emailed to the Practice Liaisons via the appropriate education email address, please do not reply via In Basket      Thank you  Oren Gibson MA

## 2023-03-17 DIAGNOSIS — G47.00 INSOMNIA, UNSPECIFIED TYPE: ICD-10-CM

## 2023-03-20 RX ORDER — ZOLPIDEM TARTRATE 6.25 MG/1
6.25 TABLET, FILM COATED, EXTENDED RELEASE ORAL
Qty: 30 TABLET | Refills: 0 | Status: SHIPPED | OUTPATIENT
Start: 2023-03-20

## 2023-03-20 NOTE — TELEPHONE ENCOUNTER
1  [id number] 02/06/2023 02/03/2023 Sedatives (Non-liquid)  30 0 30 6 25 MG NA OLIVER [id number]  [facility]  [employer] 0 / 0 [facility]    1  [id number] 11/29/2022 11/28/2022 Zolpidem Tartrate (Tablet, Extended Release)  30 0 30 6 25 MG NA LOVELY GRAHAM  [facility]  [employer] 0 / 0 [facility]    1  [id number] 10/01/2022  09/30/2022 Zolpidem Tartrate (Tablet, Extended Release)  30 0 30 6 25 MG NA OLIVER [facility]  [employer] 0 / 0 [facility]    1  [id number] 08/31/2022 08/31/2022 Acetaminophen 325 Mg / Caffeine 40 Mg Oral Tablet/ Butalbital 50 Mg (Tablet)  30 0 8 40 0 MG/50 0 MG/325 0 MG NA OLIVER BROADT  [facility]    [employer] 0 / 0 [facility]

## 2023-05-04 ENCOUNTER — OFFICE VISIT (OUTPATIENT)
Dept: FAMILY MEDICINE CLINIC | Facility: CLINIC | Age: 53
End: 2023-05-04

## 2023-05-04 VITALS
HEIGHT: 65 IN | BODY MASS INDEX: 23.66 KG/M2 | TEMPERATURE: 98 F | HEART RATE: 62 BPM | OXYGEN SATURATION: 96 % | WEIGHT: 142 LBS | DIASTOLIC BLOOD PRESSURE: 80 MMHG | SYSTOLIC BLOOD PRESSURE: 108 MMHG

## 2023-05-04 DIAGNOSIS — M54.12 CERVICAL RADICULOPATHY: Primary | ICD-10-CM

## 2023-05-12 NOTE — PROGRESS NOTES
Patient ID: Edgardo Farr is a 50 y o  female  HPI: 50 y  o female presents for evaluation of cervical pain  She has spasms in her trapezius region bilaterally, as well as pain radiating down her right arm  She denies any weakness of her arms or paresthesia        SUBJECTIVE    Family History   Problem Relation Age of Onset   • Hypertension Mother    • Other Mother         Bladder surgery   • Obesity Mother    • Cancer Mother 62        abdominal cancer   • Colon cancer Mother    • Diabetes Father    • Hypertension Father    • Heart disease Father    • Obesity Father    • Leukemia Sister    • Obesity Sister    • No Known Problems Maternal Aunt    • No Known Problems Maternal Aunt    • Cancer Maternal Grandmother         Bladder   • Dementia Maternal Grandfather    • Leukemia Paternal Grandmother    • No Known Problems Paternal Grandfather    • No Known Problems Son    • No Known Problems Son    • Stroke Neg Hx      Social History     Socioeconomic History   • Marital status: /Civil Union     Spouse name: Not on file   • Number of children: Not on file   • Years of education: Not on file   • Highest education level: Not on file   Occupational History   • Not on file   Tobacco Use   • Smoking status: Never     Passive exposure: Never   • Smokeless tobacco: Never   Vaping Use   • Vaping Use: Never used   Substance and Sexual Activity   • Alcohol use: No     Comment: per [employer]-drinks socially   • Drug use: No   • Sexual activity: Yes     Partners: Male     Birth control/protection: Female Sterilization   Other Topics Concern   • Not on file   Social History Narrative    Denied coffee, cola, tea consumption per [employer]     Social Determinants of Health     Financial Resource Strain: Not on file   Food Insecurity: Not on file   Transportation Needs: Not on file   Physical Activity: Not on file   Stress: Not on file   Social Connections: Not on file   Intimate Partner Violence: Not on file   Housing Stability: Not on file     Past Medical History:   Diagnosis Date   • Anxiety    • Cervicalgia     disc displacement,radiculopathy   • Depression    • Disease of thyroid gland    • Headache(784 0)    • HL (hearing loss)    • Hypertension     last assessed: 8/2/2016   • Kidney stone 3/2021   • Migraine    • Myofascial pain syndrome    • Obesity    • Obesity (BMI 30 0-34  9)      Past Surgical History:   Procedure Laterality Date   • BREAST BIOPSY Right     biospy w/ marker   • CARPAL TUNNEL RELEASE Right    • GANGLION CYST EXCISION Left 08/03/2017    Procedure: WRIST/HAND MASS EXCISION;  Surgeon: Natalie Romero MD;  Location: [facility];  Service: Orthopedics   • GASTRECTOMY SLEEVE LAPAROSCOPIC     • HYSTERECTOMY       Allergies   Allergen Reactions   • Levofloxacin Anaphylaxis and Swelling     Patient reports face swelled, throat was starting to close  Current Outpatient Medications:   •  B COMPLEX VITAMINS PO, Take 1 tablet by mouth daily, Disp: , Rfl:   •  butalbital-acetaminophen-caffeine (FIORICET,ESGIC) -40 mg per tablet, Take 1 tablet by mouth every 4 (four) hours as needed for headaches, Disp: 30 tablet, Rfl: 0  •  Calcium Carb-Cholecalciferol (Calcium 500 + D) 500-3  125 MG-MCG TABS, Take by mouth, Disp: , Rfl:   •  escitalopram (LEXAPRO) 20 mg tablet, Take 1 tablet (20 mg total) by mouth daily, Disp: 90 tablet, Rfl: 1  •  Galcanezumab-gnlm (Emgality) 120 MG/ML SOAJ, Inject 1 mL under the skin every 30 (thirty) days, Disp: 3 mL, Rfl: 3  •  levothyroxine 25 mcg tablet, TAKE 1 TABLET DAILY, Disp: 90 tablet, Rfl: 3  •  lisinopril (ZESTRIL) 10 mg tablet, Take 1 tablet (10 mg total) by mouth daily, Disp: 90 tablet, Rfl: 1  •  Multiple Vitamins-Iron (MULTIVITAMIN/IRON PO), Take by mouth 2 (two) times a day, Disp: , Rfl:   •  zolpidem (AMBIEN CR) 6 25 MG CR tablet, Take 1 tablet (6 25 mg total) by mouth daily at bedtime as needed for sleep, Disp: 30 tablet, Rfl: 0  •  zonisamide (Zonegran) 100 mg "capsule, Take 1 capsule (100 mg total) by mouth daily at bedtime, Disp: 90 capsule, Rfl: 2    Review of Systems  Constitutional:     Denies fever, chills ,fatigue ,weakness ,weight loss, weight gain     ENT: Denies earache ,loss of hearing ,nosebleed, nasal discharge,nasal congestion ,sore throat ,hoarseness  Pulmonary: Denies shortness of breath ,cough  ,dyspnea on exertion, orthopnea  ,PND   Cardiovascular:  Denies bradycardia , tachycardia  ,palpations, lower extremity edema leg, claudication  Breast:  Denies new or changing breast lumps ,nipple discharge ,nipple changes  Abdomen:  Denies abdominal pain , anorexia , indigestion, nausea, vomiting, constipation, diarrhea  Musculoskeletal:+ cervcial pain and muscle spasms of trapezius muscles as well as radiation down her right arm  Tori Conyngham: denies dysuria, polyuria  Skin: Denies skin rash, skin lesion, skin wound, itching, dry skin  Neuro: Denies headache, numbness, tingling, confusion, loss of consciousness, dizziness, vertigo  Psychiatric: Denies feelings of depression, suicidal ideation, anxiety, sleep disturbances    OBJECTIVE  /80   Pulse 62   Temp 98 °F (36 7 °C)   Ht 5' 5\" (1 651 m)   Wt 64 4 kg (142 lb)   LMP 11/18/2015   SpO2 96%   BMI 23 63 kg/m²   Constitutional:   NAD, well appearing and well nourished      ENT:   Conjunctiva and lids: no injection, edema, or discharge     Pupils and iris: CHELSEA bilaterally    External inspection of ears and nose: normal without deformities or discharge  Otoscopic exam: Canals patent without erythema  Nasal mucosa, septum and turbinates: Normal or edema or discharge         Oropharynx:  Moist mucosa, normal tongue and tonsils without lesions  No erythema        Pulmonary:Respiratory effort normal rate and rhythm, no increased work of breathing   Auscultation of lungs:  Clear bilaterally with no adventitious breath sounds       Cardiovascular: regular rate and rhythm, S1 and S2, no murmur, no edema " and/or varicosities of LE      Abdomen: Soft and non-distended     Positive bowel sounds      No heptomegaly or splenomegaly      Gu: no suprapubic tenderness or CVA tenderness, no urethral discharge  Lymphatic:  No anterior or posterior cervical lymphadenopathy         Musculoskeletal:  Gait and station: Normal gait      Digits and nails normal without clubbing or cyanosis       Inspection/palpation of joints, bones, and muscles:  + cervical pillar tenderness bilaterally, Pain with cervical extension and flexion rotation limited to 20 degrees to right and 30 degrees to left  Skin: Normal skin turgor and no rashes      Neuro:    Normal reflexes     Psych:   alert and oriented to person, place and time     normal mood and affect       Assessment/Plan:Diagnoses and all orders for this visit:    Cervical radiculopathy  -     MRI cervical spine wo contrast; Future    Other orders  -     Calcium Carb-Cholecalciferol (Calcium 500 + D) 500-3  125 MG-MCG TABS; Take by mouth        Reviewed with patient plan to treat with above plan      Patient instructed to call in 72 hours if not feeling better or if symptoms worsen  Answers for HPI/ROS submitted by the patient on 4/29/2023  Chronicity: chronic  Onset: more than 1 year ago  Frequency: constantly  Progression since onset: gradually worsening  Pain location: lumbar spine, thoracic spine  Pain quality: aching, shooting  Pain - numeric: 8/10  Pain is: the same all the time  Aggravated by: lying down, stress, twisting  Stiffness is present: in the morning, at night  abdominal pain: No  bladder incontinence: No  bowel incontinence: No  chest pain: No  dysuria: No  fever: No  headaches: Yes  leg pain: Yes  numbness: No  paresis: No  paresthesias: No  pelvic pain: No  perianal numbness: No  tingling: No  weakness: Yes  weight loss: No  Risk factors: obesity, sedentary lifestyle

## 2023-05-18 ENCOUNTER — HOSPITAL ENCOUNTER (OUTPATIENT)
Dept: RADIOLOGY | Age: 53
Discharge: HOME | End: 2023-05-18

## 2023-05-18 DIAGNOSIS — M54.12 CERVICAL RADICULOPATHY: ICD-10-CM

## 2023-05-23 DIAGNOSIS — M48.00 CENTRAL SPINAL STENOSIS: Primary | ICD-10-CM

## 2023-05-25 DIAGNOSIS — G47.00 INSOMNIA, UNSPECIFIED TYPE: ICD-10-CM

## 2023-05-26 RX ORDER — ZOLPIDEM TARTRATE 6.25 MG/1
6.25 TABLET, FILM COATED, EXTENDED RELEASE ORAL
Qty: 30 TABLET | Refills: 0 | Status: SHIPPED | OUTPATIENT
Start: 2023-05-26

## 2023-06-07 DIAGNOSIS — F41.9 ANXIETY: ICD-10-CM

## 2023-06-07 DIAGNOSIS — I10 ESSENTIAL HYPERTENSION: ICD-10-CM

## 2023-06-07 RX ORDER — ESCITALOPRAM OXALATE 20 MG/1
TABLET ORAL
Qty: 90 TABLET | Refills: 3 | Status: SHIPPED | OUTPATIENT
Start: 2023-06-07

## 2023-06-07 RX ORDER — LISINOPRIL 10 MG/1
TABLET ORAL
Qty: 90 TABLET | Refills: 3 | Status: SHIPPED | OUTPATIENT
Start: 2023-06-07

## 2023-06-16 ENCOUNTER — CONSULT (OUTPATIENT)
Dept: NEUROSURGERY | Facility: CLINIC | Age: 53
End: 2023-06-16
Payer: COMMERCIAL

## 2023-06-16 VITALS
DIASTOLIC BLOOD PRESSURE: 78 MMHG | BODY MASS INDEX: 23.66 KG/M2 | OXYGEN SATURATION: 96 % | HEIGHT: 65 IN | RESPIRATION RATE: 16 BRPM | WEIGHT: 142 LBS | SYSTOLIC BLOOD PRESSURE: 110 MMHG | TEMPERATURE: 97.7 F | HEART RATE: 65 BPM

## 2023-06-16 DIAGNOSIS — M48.00 CENTRAL SPINAL STENOSIS: ICD-10-CM

## 2023-06-16 PROCEDURE — 99204 OFFICE O/P NEW MOD 45 MIN: CPT | Performed by: PHYSICIAN ASSISTANT

## 2023-06-16 RX ORDER — CYCLOBENZAPRINE HCL 5 MG
5 TABLET ORAL
Qty: 30 TABLET | Refills: 0 | Status: SHIPPED | OUTPATIENT
Start: 2023-06-16
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
## 2023-06-19 ENCOUNTER — TELEPHONE (OUTPATIENT)
Dept: NEUROLOGY | Facility: CLINIC | Age: 53
End: 2023-06-19

## 2023-06-19 NOTE — TELEPHONE ENCOUNTER
Called and left a voicemail for patient - Please call back to confirm upcoming appointment with Marni Cohen  Provided patient with apt date, time and location  Informed patient that check in is at least 15 minutes prior to apt time

## 2023-06-25 DIAGNOSIS — G43.709 CHRONIC MIGRAINE WITHOUT AURA WITHOUT STATUS MIGRAINOSUS, NOT INTRACTABLE: ICD-10-CM

## 2023-06-26 RX ORDER — GALCANEZUMAB 120 MG/ML
INJECTION, SOLUTION SUBCUTANEOUS
Qty: 3 ML | Refills: 3 | Status: SHIPPED | OUTPATIENT
Start: 2023-06-26

## 2023-06-27 ENCOUNTER — OFFICE VISIT (OUTPATIENT)
Dept: NEUROLOGY | Facility: CLINIC | Age: 53
End: 2023-06-27
Payer: COMMERCIAL

## 2023-06-27 VITALS
HEIGHT: 65 IN | BODY MASS INDEX: 23.49 KG/M2 | HEART RATE: 61 BPM | RESPIRATION RATE: 16 BRPM | DIASTOLIC BLOOD PRESSURE: 81 MMHG | TEMPERATURE: 96.9 F | WEIGHT: 141 LBS | SYSTOLIC BLOOD PRESSURE: 131 MMHG

## 2023-06-27 DIAGNOSIS — G43.709 CHRONIC MIGRAINE WITHOUT AURA WITHOUT STATUS MIGRAINOSUS, NOT INTRACTABLE: ICD-10-CM

## 2023-06-27 PROCEDURE — 99213 OFFICE O/P EST LOW 20 MIN: CPT | Performed by: NURSE PRACTITIONER

## 2023-06-27 RX ORDER — ZONISAMIDE 100 MG/1
100 CAPSULE ORAL
Qty: 90 CAPSULE | Refills: 2 | Status: SHIPPED | OUTPATIENT
Start: 2023-06-27

## 2023-06-27 RX ORDER — VIT C/B6/B5/MAGNESIUM/HERB 173 50-5-6-5MG
CAPSULE ORAL DAILY
COMMUNITY

## 2023-06-27 NOTE — PATIENT INSTRUCTIONS
Trial a different over the counter antihistamine such as allegra or zyrtec instead of the claritin; also use flonase over the counter at this time  We could consider a referral to ENT or allergist in future if needed  Continue emgality; we will submit a restart of botox  Follow up in 6 months time

## 2023-06-27 NOTE — PROGRESS NOTES
Patient ID: Iza Vences is a 50 y o  female  Assessment/Plan:  Patient Instructions:  Trial a different over the counter antihistamine such as allegra or zyrtec instead of the claritin; also use flonase over the counter at this time  We could consider a referral to ENT or allergist in future if needed  Continue emgality; we will submit a restart of botox  Follow up in 6 months time  Diagnoses and all orders for this visit:    Chronic migraine without aura without status migrainosus, not intractable  -     zonisamide (Zonegran) 100 mg capsule; Take 1 capsule (100 mg total) by mouth daily at bedtime          Subjective:  Maximus Krause presents for 6 month headache follow up  She states headaches have not changed much; frequency is still 3x/week despite use of emgality  She does feel that the medication is still helpful  She has noticed a worsening in headaches with pressure/weather change  She states she did have an allergy panel done in 2016 and it was positive for allergy to ragweed; she has been taking claritin daily for many years; she no longer thinks it is working very well  She did recently revisit neurosurgeon after repeat MRI C spine; no surgical evaluation is indicated; she continues with neck pain/muscle spasms that used to be better with botox use (she states botox was stopped because it wasn't able to be used along with the emgality at the time)  She was recently given flexeril but she has not yet tried this  Headaches have not changed in location; she denies any red flag headache symptoms  She is not overusing the fioricet and it is more helpful than previous trials of triptan/gepant  Review of recent labs show stable labs/vitamin levels  She is interested in starting magnesium supplement back up again for migraine prevention, discussed getting this over the counter and potential side effects      HPI   Since your last visit are your headaches Remained the Same  Are you taking your current medications as prescribed? yes  Do you have any side effects? no  How often do you use abortive medications to treat a headache? 3 times per month  How effective are they? somewhat effective  From 0-10, how severe is the pain for a typical headache for you? 9  What does your typical headache pain feel like? sharp and achy  Where is your typical headache? right-sided unilateral, retro-orbital and nuchal  What is your current headache frequency: 3 times per week  How long does your typical headache last? 1 day(s)  In addition to the head pain, what other symptoms do you have before or during your headaches? None  Do you have anything you need to discuss with the doctor during your visit? Medicine  Previous History:  Current medical illnesses:  QTc 8/17/2020 426 ms     What medications do you take or have you taken for your headaches? Preventive Medications Tried:   Vit D, CoQ10, Magnesium, B2  Amitriptyline, Lexapro  Amlodipine  Zonisamide  Botox  Topamax,   Amrix, Tizanidine,   Benacar,   Olanzapine  Emgality  Abortive Medications Tried:   Rizatriptan  Prochlorperzine   Imitrex,   Zofran,   Toradol,   Dexamethasone,   rizatriptan,   olanzapine   Nurtec     Headache history with updates: Alternative therapies used in the past for headaches? Massage, chiropractor  Headache are worse if the patient: cough, sneeze, bending over, Exertion  Headache triggers:  Fatigue, Stress/tension, lack of food, heat     Aura/warning and how long does it last ? No      What time of the day do the headaches start? varies     How long do the headaches last? Last a few hours      Describe your usual headache ?   TTH: pressure, throbbing  Migraines: pressure, throbbing, increase in pain, stabbing     Where is your headache located?  Right behind the eyes      What is the intensity of pain?   TTH: 6/10  Migraines: 8-10/10     Associated symptoms:   • Decrease of appetite, nausea, vomiting,   • Photophobia, phonophobia, sensitivity to "smell   • Problem with concentration  • Decreased social functioning  • Tinnitus, light-headed or dizzy, stiff or sore neck,   • prefer to be alone and in a dark room, unable to work     What time of the year do headaches occur more frequently? During school year  Have you seen someone else for headaches or pain? Yes, PCP  Have you had trigger point injection performed and how often? No  Have you had Botox injection performed and how often? Yes and stopped working  Have you had epidural injections or transforaminal injections performed? Yes  Have you used CBD or THC for your headaches and how often? Yes, tried CBD oil but didn't help  Are you current pregnant or planning on getting pregnant? No, hysterectomy  Have you ever had any Brain imaging? yes      2013 CT Head  Normal    5/18/2023 MRI C spine:  IMPRESSION:  Multilevel spondylotic changes are similar to the prior study, most pronounced at C5-C6  No cord signal abnormality  The following portions of the patient's history were reviewed and updated as appropriate: allergies, current medications, past family history, past medical history, past social history, past surgical history and problem list          Objective:    Blood pressure 131/81, pulse 61, temperature (!) 96 9 °F (36 1 °C), temperature source Temporal, resp  rate 16, height 5' 5\" (1 651 m), weight 64 kg (141 lb), last menstrual period 11/18/2015, not currently breastfeeding  Physical Exam  Vitals reviewed  Constitutional:       General: She is not in acute distress  Appearance: She is normal weight  She is not ill-appearing, toxic-appearing or diaphoretic  HENT:      Head: Normocephalic and atraumatic  Comments: Maxillary sinus tenderness bilateral-worse on left     Right Ear: External ear normal  Tympanic membrane is retracted  Tympanic membrane is not erythematous  Left Ear: External ear normal  Tympanic membrane is retracted  Tympanic membrane is not erythematous        " Nose: Congestion present  Mouth/Throat:      Pharynx: Oropharynx is clear  Eyes:      General: Lids are normal          Right eye: No discharge  Left eye: No discharge  Extraocular Movements: Extraocular movements intact  Conjunctiva/sclera: Conjunctivae normal       Pupils: Pupils are equal, round, and reactive to light  Cardiovascular:      Rate and Rhythm: Normal rate and regular rhythm  Pulses: Normal pulses  Heart sounds: Normal heart sounds  Pulmonary:      Effort: Pulmonary effort is normal       Breath sounds: Normal breath sounds  Abdominal:      General: There is no distension  Musculoskeletal:      Cervical back: Tenderness present  Decreased range of motion  Right lower leg: No edema  Left lower leg: No edema  Skin:     General: Skin is warm and dry  Capillary Refill: Capillary refill takes less than 2 seconds  Findings: No rash  Neurological:      General: No focal deficit present  Mental Status: She is alert  Mental status is at baseline  Motor: Motor strength is normal      Coordination: Romberg sign negative  Deep Tendon Reflexes:      Reflex Scores:       Brachioradialis reflexes are 2+ on the right side and 2+ on the left side  Patellar reflexes are 2+ on the right side and 2+ on the left side  Psychiatric:         Mood and Affect: Mood normal          Speech: Speech normal          Behavior: Behavior normal          Neurological Exam  Mental Status  Alert  Oriented to person, place and time  Speech is normal  Language is fluent with no aphasia  Cranial Nerves  CN II: Visual acuity is normal  Visual fields full to confrontation  CN III, IV, VI: Extraocular movements intact bilaterally  Normal lids and orbits bilaterally  Pupils equal round and reactive to light bilaterally  CN V: Facial sensation is normal   CN VII: Full and symmetric facial movement    CN VIII: Hearing is normal   CN IX, X: Palate elevates symmetrically  Normal gag reflex  CN XI: Shoulder shrug strength is normal   CN XII: Tongue midline without atrophy or fasciculations  Motor  Normal muscle bulk throughout  Strength is 5/5 throughout all four extremities  Sensory  Light touch is normal in upper and lower extremities  Reflexes                                            Right                      Left  Brachioradialis                    2+                         2+  Patellar                                2+                         2+    Coordination  Right: Finger-to-nose normal Left: Finger-to-nose normal     Gait  Casual gait is normal including stance, stride, and arm swing  Romberg is absent  Able to rise from chair without using arms  ROS:    Review of Systems   Constitutional: Negative for appetite change, fatigue and fever  HENT: Negative  Negative for hearing loss, tinnitus, trouble swallowing and voice change  Eyes: Negative  Negative for photophobia, pain and visual disturbance  Respiratory: Negative  Negative for shortness of breath  Cardiovascular: Negative  Negative for palpitations  Gastrointestinal: Negative  Negative for nausea and vomiting  Endocrine: Negative  Negative for cold intolerance  Genitourinary: Negative  Negative for dysuria, frequency and urgency  Musculoskeletal: Negative for back pain, gait problem, myalgias and neck pain  Skin: Negative  Negative for rash  Allergic/Immunologic: Negative  Neurological: Negative  Negative for dizziness, tremors, seizures, syncope, facial asymmetry, speech difficulty, weakness, light-headedness, numbness and headaches  Hematological: Negative  Does not bruise/bleed easily  Psychiatric/Behavioral: Negative  Negative for confusion, hallucinations and sleep disturbance     ROS was reviewed and updated as appropriate

## 2023-06-28 ENCOUNTER — TELEPHONE (OUTPATIENT)
Dept: NEUROLOGY | Facility: CLINIC | Age: 53
End: 2023-06-28

## 2023-06-28 NOTE — TELEPHONE ENCOUNTER
----- Message from Nara Bailey, [address] [place] [address] sent at 6/27/2023  3:42 PM EDT -----  I would like to re start botox on this patient for chronic migraine please.   Thanks,  Gilma Bruno

## 2023-06-28 NOTE — TELEPHONE ENCOUNTER
Patient is a re-start to Botox. You would like to start this patient on:    Botox 200 UNITS  Qty. 1  DX.G43.709  Sig: Inject up to 200 UNITS I.M into the various sites in the head and neck once every three months for one year with  Refills: 3    If you agree to the order transcribed above, please respond directly if you agree or not so that I may proceed further with authorization and for use of Verbal Order. Thank you.

## 2023-07-06 NOTE — TELEPHONE ENCOUNTER
New-Start-Botox-Authorization:    Submitted Prior-Authorization request to [employer] via fax for:    New-Start:  Botox-200 units. QTY:1, Q3 Months. DX: G43.709, Chronic Migraine. Awaiting approval/denial response. Will follow up on the status of this.

## 2023-07-07 NOTE — TELEPHONE ENCOUNTER
Received fax with auth# details.     [id number]   Phone number for Clinical Services is # [phone] (Option 2)

## 2023-07-14 NOTE — TELEPHONE ENCOUNTER
Called below to check status and received automated message stating "We are experiencing system-wide issues. Please call back later." Call was disconnected. Will attempt at a later time.

## 2023-07-29 DIAGNOSIS — G47.00 INSOMNIA, UNSPECIFIED TYPE: ICD-10-CM

## 2023-07-31 RX ORDER — ZOLPIDEM TARTRATE 6.25 MG/1
6.25 TABLET, FILM COATED, EXTENDED RELEASE ORAL
Qty: 30 TABLET | Refills: 0 | Status: SHIPPED | OUTPATIENT
Start: 2023-07-31

## 2023-07-31 NOTE — TELEPHONE ENCOUNTER
[id number] 06/27/2023 06/23/2023 Acetaminophen 325 Mg / Caffeine 40 Mg Oral Tablet/ Butalbital 50 Mg (Tablet) 30.0 5 40.0 MG/50.0 MG/325.0 MG NA OLIVER [employer]. [facility]  [facility] / 0 [place]     1 [id number] 05/27/2023 05/26/2023 Sedatives (Non-liquid) 30.0 30 6.25 MG NA OLIVER [employer]. [facility]  0 / 0 [place]    1 [id number] 03/21/2023 03/20/2023 Acetaminophen 325 Mg / Caffeine 40 Mg Oral Tablet/ Butalbital 50 Mg (Tablet) 30.0 5 40.0 MG/50.0 MG/325.0 MG NA OLIVER [employer]. [facility]  0 / 0 [place]    1 [id number] 03/20/2023 03/20/2023 Sedatives (Non-liquid) 30.0 30 6.25 MG NA OLIVER [employer] [facility] 0 / 0 [place]    1 [id number] 02/06/2023 02/03/2023 Sedatives (Non-liquid) 30.0 30 6.25 MG NA OLIVER [employer], [employer].

## 2023-08-09 ENCOUNTER — TELEPHONE (OUTPATIENT)
Dept: NEUROLOGY | Facility: CLINIC | Age: 53
End: 2023-08-09

## 2023-08-09 NOTE — TELEPHONE ENCOUNTER
Recd 8-8-23 4:28pm    I, my name is Lisa Ngo. My YOB: 1970. I just got a call today about the botox prescription for my pharmacy. And I wasn't sure if that was an appointment. We were definitely going to follow through with after my june appointment. I didn't hear anything out about botox. My concern is on the co pay they, they said it may have a $200.00 copay. If someone could call me back and let me know what the cost for this, this procedure is going to be. When I had it done at several years ago, there was no out of pocket costs for it. I would appreciate it. I told them to put the prescription on hold and so I spoke to someone again, my number is [phone]. Thank you.  ___________________________________    Pt calling re:  Botox injections  LOV - 6/2023 Xiomy

## 2023-08-10 NOTE — TELEPHONE ENCOUNTER
Called patient and left VM message requesting she contact me directly at # [phone] to confirm with her that she would indeed like me to scheduled her Botox delivery w/ [facility] at this time or wait until she applies for the Botox Savings.      Will follow up fabricioian on the status of this order w/ patient and [facility] by Monday 8/14/2023 if I do not receive an update by end of the week. (Please see my separate telephone encounter for updated notes on patients Botox and delivery status) thank you.

## 2023-09-04 DIAGNOSIS — G43.809 OTHER MIGRAINE WITHOUT STATUS MIGRAINOSUS, NOT INTRACTABLE: ICD-10-CM

## 2023-09-05 RX ORDER — BUTALBITAL, ACETAMINOPHEN AND CAFFEINE 50; 325; 40 MG/1; MG/1; MG/1
1 TABLET ORAL EVERY 4 HOURS PRN
Qty: 30 TABLET | Refills: 0 | Status: SHIPPED | OUTPATIENT
Start: 2023-09-05

## 2023-09-05 NOTE — TELEPHONE ENCOUNTER
1 [id number] 08/02/2023 07/31/2023 Sedatives (Non-liquid) 30.0 30 6.25 MG NA OLIVER [employer], [employer]. [employer] 0 / 0 [place]   1 [id number] 06/27/2023 06/23/2023 Acetaminophen 325 Mg / Caffeine 40 Mg Oral Tablet/ Butalbital 50 Mg (Tablet) 30.0 5 40.0 MG/50.0 MG/325.0 MG NA OLIVER [employer] [facility] [employer] [employer] 0 / 0 [place]   1 [id number] 05/27/2023 05/26/2023 Sedatives (Non-liquid) 30.0 30 6.25 MG NA OLIVER [employer], [employer].  [employer] 0

## 2023-09-11 ENCOUNTER — TELEPHONE (OUTPATIENT)
Dept: NEUROLOGY | Facility: CLINIC | Age: 53
End: 2023-09-11

## 2023-09-11 NOTE — TELEPHONE ENCOUNTER
Called and spoke to patient - confirmed upcoming appointment with Rolando Richards on 09/14/23 4:00 Pm at the [facility]. Provided patient with apt date, time and location. Informed patient that check in is at least 15 minutes prior to apt time. The patient is not  having any issues or concerns at this time.

## 2023-09-14 ENCOUNTER — PROCEDURE VISIT (OUTPATIENT)
Dept: NEUROLOGY | Facility: CLINIC | Age: 53
End: 2023-09-14
Payer: COMMERCIAL

## 2023-09-14 VITALS — SYSTOLIC BLOOD PRESSURE: 125 MMHG | DIASTOLIC BLOOD PRESSURE: 78 MMHG

## 2023-09-14 DIAGNOSIS — G43.709 CHRONIC MIGRAINE WITHOUT AURA: Primary | ICD-10-CM

## 2023-09-14 PROCEDURE — 64615 CHEMODENERV MUSC MIGRAINE: CPT | Performed by: NURSE PRACTITIONER

## 2023-09-14 NOTE — PATIENT INSTRUCTIONS
Botox Therapy  Important Information    Our goal is to make sure you fully understand how Botox Therapy treatment may benefit you and to help you understand how you can play an active role in your treatments and ongoing care. Please review the following information below. Call our office IMMEDIATELY @ [phone] and speak to one of our Botox Coordinators if you have a change in insurance. (a prior authorization is required and accurate information is vital)  Please call at least 24 hours in advance if you can't make your appointment. Appointments are scheduled every 91 days (this will be scheduled in advance before leaving the office)  You must allow for at least 2-3 treatments to determine if Botox is right for you. It may take a few weeks to see a response from treatment. No hair dye or scalp massage or treatment within 24 hours of treatment  We encourage you to use a headache diary or journal to document your headache frequency and severity. You can also utilize the Migraine Mario brett (downloadable on [employer])  Sign up for the Botox Savings Program. (commercial insurance patients may qualify) Sign up at [employer] or call [phone] Option: 4  To get more information on Botox therapy for Chronic Migraines and see frequently asked questions, please visit botoxchronSilentsoftmiSocioSquare[employer]. "[employer]"  If you have any questions or concerns, please speak to one of our Botox Coordinators at [phone]. We look forward to servicing you!

## 2023-09-14 NOTE — PROGRESS NOTES
Universal Protocol   Consent: Verbal consent obtained. Written consent obtained. Risks and benefits: risks, benefits and alternatives were discussed  Consent given by: patient  Time out: Immediately prior to procedure a "time out" was called to verify the correct patient, procedure, equipment, support staff and site/side marked as required. Patient understanding: patient states understanding of the procedure being performed  Patient consent: the patient's understanding of the procedure matches consent given  Procedure consent: procedure consent matches procedure scheduled  Patient identity confirmed: verbally with patient        Chemodenervation     Date/Time 9/14/2023 4:00 PM     Performed by  Lazarus Ferdinand, CRNP   Authorized by Lazarus Ferdinand, CRNP       Pre-procedure details      Prepped With: Alcohol     Anesthesia  (see MAR for exact dosages): Anesthesia method:  None   Procedure details     Position:  Upright   Botox     Botox Type:  Type A    mL's of Botulinum Toxin:  165 (200 units, 35 units waste.)    mL's of preservative free sterile saline:  4    Final Concentration per CC:  50 units    Needle gauge: 30 g 0.5in. Procedures     Botox Procedures: chronic headache      Indications: migraines      Date of last exam:  6/27/2023    Last date: first injection.    Injection Location      Head / Face:  L superior cervical paraspinal, R superior cervical paraspinal, L , R , L frontalis, R frontalis, masseter, L inferior occipitalis, R inferior occipitalis, L lateral occipitalis, R medial occipitalis, L medial occipitalis, R lateral occipitalis, procerus, L temporalis, R temporalis, L superior trapezius and R superior trapezius    L  injection amount:  5 unit(s)    R  injection amount:  5 unit(s)    L lateral frontalis:  5 unit(s)    R lateral frontalis:  5 unit(s)    L medial frontalis:  5 unit(s)    R medial frontalis:  5 unit(s)    L temporalis injection amount:  20 unit(s)    R temporalis injection amount:  20 unit(s)    Comments:  4 sites    Procerus injection amount:  5 unit(s)    L Masseter injection amount:  5 unit(s)    R Masseter injection amount:  5 unit(s)    L inferior occipitalis injection amount:  5 unit(s)    R inferior occipitalis injection amount:  5 unit(s)    L lateral occipitalis injection amount:  5 unit(s)    R lateral occipitalis injection amount:  5 unit(s)    L medial occipitalis injection amount:  5 unit(s)    R medial occipitalis injection amount:  5 unit(s)    L superior cervical paraspinal injection amount:  10 unit(s)    R superior cervical paraspinal injection amount:  10 unit(s)    L superior trapezius injection amount:  15 unit(s)    R superior trapezius injection amount:  15 unit(s)    Comments:  3 sites   Total Units     Total units used:  165 (200 units, 35 units waste.)   Post-procedure details      Chemodenervation:  Chronic migraine    Facial Nerve Location[de-identified]  Bilateral facial nerve    Patient tolerance of procedure:   Tolerated well, no immediate complications

## 2023-09-25 DIAGNOSIS — G47.00 INSOMNIA, UNSPECIFIED TYPE: ICD-10-CM

## 2023-09-26 RX ORDER — ZOLPIDEM TARTRATE 6.25 MG/1
6.25 TABLET, FILM COATED, EXTENDED RELEASE ORAL
Qty: 30 TABLET | Refills: 0 | Status: SHIPPED | OUTPATIENT
Start: 2023-09-26

## 2023-10-09 ENCOUNTER — NURSE TRIAGE (OUTPATIENT)
Age: 53
End: 2023-10-09

## 2023-10-09 NOTE — TELEPHONE ENCOUNTER
Pt calling with nasal and sinus congestion x 2 days. She will treat at home and test for covid. She will call back if she is covid positive or if symptoms do not resolve in the next few days. Reason for Disposition  • Colds with no complications    Answer Assessment - Initial Assessment Questions  1. ONSET: "When did the nasal discharge start?"       About 2 days ago   2. AMOUNT: "How much discharge is there?"       Moderate   3. COUGH: "Do you have a cough?" If yes, ask: "Describe the color of your sputum" (clear, white, yellow, green)      dry  4. RESPIRATORY DISTRESS: "Describe your breathing."       no  5. FEVER: "Do you have a fever?" If Yes, ask: "What is your temperature, how was it measured, and when did it start?"        no  6. SEVERITY: "Overall, how bad are you feeling right now?" (e.g., doesn't interfere with normal activities, staying home from school/work, staying in bed)       no  7. OTHER SYMPTOMS: "Do you have any other symptoms?" (e.g., sore throat, earache, wheezing, vomiting)      no  8.  PREGNANCY: "Is there any chance you are pregnant?" "When was your last menstrual period?"      no    Protocols used: COMMON COLD-ADULT-OH

## 2023-10-09 NOTE — TELEPHONE ENCOUNTER
Regarding: Appt request for possible sinus infection  ----- Message from Phoebe Nicholas sent at 10/9/2023 10:20 AM EDT -----  Pt called possible sinus infection - runny nose, congestion, slight cough, headache x 2 days - no fever - no covid - will test when she gets home today    Patient will like to schedule appt for after 3 today or for tomorrow since she's off from work

## 2023-10-12 ENCOUNTER — TELEPHONE (OUTPATIENT)
Dept: NEUROLOGY | Facility: CLINIC | Age: 53
End: 2023-10-12

## 2023-10-12 NOTE — TELEPHONE ENCOUNTER
Patient called in, she had her follow up appointment scheduled for 12/27/23 and her botox appointment for 12/28/23 and wanted her appts to be coordianted together instead of coming 2 separate days back to back. I scheduled her follow up appt for the same day as her botox appt but for 1pm. Will Kavon be able to see her at her 11am botox appt all at once instead of having her wait? If this is ok could you please call patient back to confirm. Thank you!

## 2023-10-13 NOTE — TELEPHONE ENCOUNTER
That is ok I have moved up th e Botox that way she does not have to wait in between I did leave a voicemail informing the patient.

## 2023-11-27 DIAGNOSIS — G47.00 INSOMNIA, UNSPECIFIED TYPE: ICD-10-CM

## 2023-11-27 RX ORDER — ZOLPIDEM TARTRATE 6.25 MG/1
6.25 TABLET, FILM COATED, EXTENDED RELEASE ORAL
Qty: 30 TABLET | Refills: 0 | Status: SHIPPED | OUTPATIENT
Start: 2023-11-27

## 2023-11-27 NOTE — TELEPHONE ENCOUNTER
1 [id number] 09/26/2023 09/26/2023 Zolpidem Tartrate (Tablet, Extended Release) 30.0 30 6.25 MG NA OLIVER BROADT [facility] / 0 [place]    1 [id number] 09/12/2023 09/05/2023 Acetaminophen 325 Mg / Caffeine 40 Mg Oral Tablet/ Butalbital 50 Mg (Tablet) 30.0 5 40.0 MG/50.0 MG/325.0 MG NA OLIVER [facility]T [employer] / 0 [place]    1 [id number] 08/02/2023 07/31/2023 Zolpidem Tartrate, Extended Release (Oral Tablet) 30.0 30 ER 6.25 MG NA OLIVER [employer] 0 / 0 [place]    1 [id number] 06/27/2023 06/23/2023 Acetaminophen 325 Mg / Caffeine 40 Mg Oral Tablet/ Butalbital 50 Mg (Tablet) 30.0 5 40.0 MG/50.0 MG/325.0 MG NA OLIVER BROADT [employer] [facility] / 0 [place]    1 [id number] 05/27/2023 05/26/2023 Zolpidem Tartrate, Extended Release (Oral Tablet) 30.0 30 ER 6.25 MG NA OLIVER [employer] [facility] / 0 [place]    1 [id number] 03/21/2023 03/20/2023 Acetaminophen 325 Mg / Caffeine 40 Mg Oral Tablet/ Butalbital 50 Mg (Tablet) 30.0 5 40.0 MG/50.0 MG/325.0 MG NA OLIVER [employer] [facility] / 0 [place]    1 [id number] 03/20/2023 03/20/2023 Zolpidem Tartrate, Extended Release (Oral Tablet) 30.0 30 ER 6.25 MG NA OLIVER [employer] [facility] / 0 [place]    1 [id number] 02/06/2023 02/03/2023 Zolpidem Tartrate, Extended Release (Oral Tablet) 30.0 30 ER 6.25 MG NA OLIVER [employer] [facility] [facility] 0 / 0 [place]    1 [id number] 11/29/2022 11/28/2022 Zolpidem Tartrate (Tablet, Extended Release) 30.0 30 6.25 MG NA [facility]

## 2023-11-29 ENCOUNTER — TELEPHONE (OUTPATIENT)
Dept: NEUROLOGY | Facility: CLINIC | Age: 53
End: 2023-11-29

## 2023-11-29 NOTE — TELEPHONE ENCOUNTER
Patient is scheduled for [facility] 12/28 in [facility]. [address] [facility] and spoke to [facility]. Scheduled delivery      Botox 200 UNITS  Qty. 1  Scheduled: 12/12  Location: ALL  Via: Ups/Fedex       Please let us know if Botox does not arrive. Thanks! Precwolfgang Hodges

## 2023-12-07 ENCOUNTER — TELEMEDICINE (OUTPATIENT)
Dept: FAMILY MEDICINE CLINIC | Facility: CLINIC | Age: 53
End: 2023-12-07
Payer: COMMERCIAL

## 2023-12-07 DIAGNOSIS — J01.90 ACUTE SINUSITIS, RECURRENCE NOT SPECIFIED, UNSPECIFIED LOCATION: Primary | ICD-10-CM

## 2023-12-07 PROCEDURE — 99213 OFFICE O/P EST LOW 20 MIN: CPT | Performed by: FAMILY MEDICINE

## 2023-12-07 RX ORDER — PREDNISONE 10 MG/1
TABLET ORAL
Qty: 26 TABLET | Refills: 0 | Status: SHIPPED | OUTPATIENT
Start: 2023-12-07

## 2023-12-07 RX ORDER — DEXTROMETHORPHAN HYDROBROMIDE AND PROMETHAZINE HYDROCHLORIDE 15; 6.25 MG/5ML; MG/5ML
5 SYRUP ORAL 4 TIMES DAILY PRN
Qty: 180 ML | Refills: 0 | Status: SHIPPED | OUTPATIENT
Start: 2023-12-07

## 2023-12-07 NOTE — PROGRESS NOTES
COVID-19 Outpatient Progress Note    Assessment/Plan:    Problem List Items Addressed This Visit    None     Disposition:     Patient with asymptomatic/mild COVID-19: They were recommended to isolate for at least 5 days (day 0 is the day symptoms appeared or the date the specimen was collected for the positive test for people who are asymptomatic). If they are asymptomatic or symptoms are improving with no fevers in the past 24 hours, isolation may be ended followed by 5 days of wearing a high quality mask when around others to minimize risk of infecting others. They should wear a mask through day 10 and a test-based strategy may be used to remove a mask sooner. I have spent a total time of 15 minutes on the day of the encounter for this patient including discussing diagnostic results, discussing prognosis, risks and benefits of treatment options, instructions for management, patient and family education, importance of treatment compliance, risk factor reductions, impressions, counseling/coordination of care and obtaining or reviewing history. Encounter provider: Junito Laurent DO     Provider located at: [address] [place] [address] Road [address]     Recent Visits  No visits were found meeting these conditions. Showing recent visits within past 7 days and meeting all other requirements  Future Appointments  No visits were found meeting these conditions. Showing future appointments within next 150 days and meeting all other requirements     This virtual check-in was done via [address] [place] [address] and patient was informed that this is a secure, HIPAA-compliant platform. She agrees to proceed. Patient agrees to participate in a virtual check in via telephone or video visit instead of presenting to the office to address urgent/immediate medical needs. Patient is aware this is a billable service.  She acknowledged consent and understanding of privacy and security of the video platform. The patient has agreed to participate and understands they can discontinue the visit at any time. After connecting through [facility], the patient was identified by name and date of birth. Werner Gray was informed that this was a telemedicine visit and that the exam was being conducted confidentially over secure lines. My office door was closed. No one else was in the room. Werner Gray acknowledged consent and understanding of privacy and security of the telemedicine visit. I informed the patient that I have reviewed her record in Epic and presented the opportunity for her to ask any questions regarding the visit today. The patient agreed to participate. Verification of patient location:  Patient is located in the following state in which I hold an active license: [place]    Subjective:   Werner Gray is a 48 y.o. female who has been screened for COVID-19. Symptom change since last report: resolving. Patient's symptoms include fever, malaise, nasal congestion, sore throat, cough and headache. Patient denies chills, fatigue, rhinorrhea, anosmia, loss of taste, shortness of breath, chest tightness, abdominal pain, nausea, vomiting, diarrhea and myalgias. - Date of symptom onset: 12/6/2023  - Date of positive COVID-19 test: 12/7/2023. Type of test: Home antigen. COVID-19 vaccination status: Fully vaccinated with booster    Samantha Fallon has been staying home and has isolated themselves in her home. She is taking care to not share personal items and is cleaning all surfaces that are touched often, like counters, tabletops, and doorknobs using household cleaning sprays or wipes. She is wearing a mask when she leaves her room. Lab Results   Component Value Date    SARSCOV2 Negative 12/14/2022    SARSCOV2 Not Detected 11/18/2020       Review of Systems   Constitutional:  Positive for fever. Negative for chills and fatigue.    HENT:  Positive for congestion and sore throat. Negative for rhinorrhea. Respiratory:  Positive for cough. Negative for chest tightness and shortness of breath. Gastrointestinal:  Negative for abdominal pain, diarrhea, nausea and vomiting. Musculoskeletal:  Negative for myalgias. Neurological:  Positive for headaches. Current Outpatient Medications on File Prior to Visit   Medication Sig    B COMPLEX VITAMINS PO Take 1 tablet by mouth daily    butalbital-acetaminophen-caffeine (FIORICET,ESGIC) -40 mg per tablet Take 1 tablet by mouth every 4 (four) hours as needed for headaches    Calcium Carb-Cholecalciferol (Calcium 500 + D) 500-3. 125 MG-MCG TABS Take by mouth    cyclobenzaprine (FLEXERIL) 5 mg tablet Take 1 tablet (5 mg total) by mouth daily at bedtime    Emgality 120 MG/ML SOAJ INJECT 1 ML UNDER THE SKIN EVERY 30 DAYS    escitalopram (LEXAPRO) 20 mg tablet TAKE 1 TABLET DAILY    levothyroxine 25 mcg tablet TAKE 1 TABLET DAILY    lisinopril (ZESTRIL) 10 mg tablet TAKE 1 TABLET DAILY    Multiple Vitamins-Iron (MULTIVITAMIN/IRON PO) Take by mouth 2 (two) times a day    Turmeric 500 MG CAPS Take by mouth daily    zolpidem (AMBIEN CR) 6.25 MG CR tablet Take 1 tablet (6.25 mg total) by mouth daily at bedtime as needed for sleep    zonisamide (Zonegran) 100 mg capsule Take 1 capsule (100 mg total) by mouth daily at bedtime       Objective:    [facility] 11/18/2015        Physical Exam  Vitals reviewed. Constitutional:       General: She is not in acute distress. Appearance: Normal appearance. She is not toxic-appearing. Eyes:      General:         Right eye: No discharge. Left eye: No discharge. Skin:     Findings: No rash. Neurological:      Mental Status: She is alert. Psychiatric:         Mood and Affect: Mood normal.         Behavior: Behavior normal.         Thought Content:  Thought content normal.         Judgment: Judgment normal.       Phoenix Orozco DO

## 2023-12-12 NOTE — TELEPHONE ENCOUNTER
Botox number of units: 200 units.   Botox quantity: 1  Arrived at what location: [facility]  Botox at Correct Administering Location: yes  NDC number: 9842-0064-90  Lot number: [id number]  Expiration Date: 05/31/26  Appt notes indicate correct medication: yes

## 2023-12-22 ENCOUNTER — TELEPHONE (OUTPATIENT)
Dept: NEUROLOGY | Facility: CLINIC | Age: 53
End: 2023-12-22

## 2023-12-22 NOTE — TELEPHONE ENCOUNTER
Called and left a voicemail for patient - Please call back to confirm upcoming appointment with Danielle. Provided patient with apt date, time and location. Informed patient that check in is at least 15 minutes prior to apt time.

## 2023-12-28 ENCOUNTER — PROCEDURE VISIT (OUTPATIENT)
Dept: NEUROLOGY | Facility: CLINIC | Age: 53
End: 2023-12-28
Payer: COMMERCIAL

## 2023-12-28 VITALS — DIASTOLIC BLOOD PRESSURE: 71 MMHG | TEMPERATURE: 97.2 F | SYSTOLIC BLOOD PRESSURE: 104 MMHG | HEART RATE: 69 BPM

## 2023-12-28 DIAGNOSIS — G43.709 CHRONIC MIGRAINE WITHOUT AURA WITHOUT STATUS MIGRAINOSUS, NOT INTRACTABLE: Primary | ICD-10-CM

## 2023-12-28 PROCEDURE — 64615 CHEMODENERV MUSC MIGRAINE: CPT | Performed by: NURSE PRACTITIONER

## 2023-12-28 NOTE — PATIENT INSTRUCTIONS
Botox Therapy  Important Information    Our goal is to make sure you fully understand how Botox Therapy treatment may benefit you and to help you understand how you can play an active role in your treatments and ongoing care. Please review the following information below.    Call our office IMMEDIATELY @ [phone] and speak to one of our Botox Coordinators if you have a change in insurance. (a prior authorization is required and accurate information is vital)  Please call at least 24 hours in advance if you can't make your appointment.  Appointments are scheduled every 91 days (this will be scheduled in advance before leaving the office)  You must allow for at least 2-3 treatments to determine if Botox is right for you. It may take a few weeks to see a response from treatment.  No hair dye or scalp massage or treatment within 24 hours of treatment  We encourage you to use a headache diary or journal to document your headache frequency and severity. You can also utilize the Migraine Mario brett (downloadable on most smart phones)  Sign up for the Botox Savings Program. (commercial insurance patients may qualify) Sign up at [employer] or call [phone] Option: 4  To get more information on Botox therapy for Chronic Migraines and see frequently asked questions, please visit [email or web address]  If you have any questions or concerns, please speak to one of our Botox Coordinators at [phone].    We look forward to servicing you!

## 2023-12-28 NOTE — PROGRESS NOTES
"Universal Protocol   Consent: Verbal consent obtained. Written consent obtained.  Risks and benefits: risks, benefits and alternatives were discussed  Consent given by: patient  Time out: Immediately prior to procedure a \"time out\" was called to verify the correct patient, procedure, equipment, support staff and site/side marked as required.  Patient understanding: patient states understanding of the procedure being performed  Patient consent: the patient's understanding of the procedure matches consent given  Procedure consent: procedure consent matches procedure scheduled  Patient identity confirmed: verbally with patient      Chemodenervation     Date/Time  12/28/2023 12:30 PM     Performed by  CAMI Fatima   Authorized by  CAMI Fatima     Pre-procedure details      Prepped With: Alcohol     Anesthesia  (see MAR for exact dosages):     Anesthesia method:  None   Procedure details      Position:  Upright   Botox      Botox Type:  Type A    Brand:  Botox    mL's of Botulinum Toxin:  185 (200 units 15 waste)    mL's of preservative free sterile saline:  4    Final Concentration per CC:  50 units    Needle gauge: 30g 0.5in.   Procedures      Botox Procedures: chronic headache      Indications: migraines      Date of last exam:  6/27/2023    Date of last injection:  9/14/2023   Injection Location      Head / Face:  L superior cervical paraspinal, R superior cervical paraspinal, L , R , L frontalis, R frontalis, masseter, L inferior occipitalis, R inferior occipitalis, L lateral occipitalis, R medial occipitalis, L medial occipitalis, R lateral occipitalis, L temporalis, R temporalis, L superior trapezius, R superior trapezius and procerus    L  injection amount:  5 unit(s)    R  injection amount:  5 unit(s)    L lateral frontalis:  5 unit(s)    R lateral frontalis:  5 unit(s)    L medial frontalis:  5 unit(s)    R medial frontalis:  5 unit(s)    L temporalis " injection amount:  30 unit(s)    R temporalis injection amount:  30 unit(s)    Procerus injection amount:  5 unit(s)    L Masseter injection amount:  5 unit(s)    R Masseter injection amount:  5 unit(s)    L inferior occipitalis injection amount:  5 unit(s)    R inferior occipitalis injection amount:  5 unit(s)    L lateral occipitalis injection amount:  5 unit(s)    R lateral occipitalis injection amount:  5 unit(s)    L medial occipitalis injection amount:  5 unit(s)    R medial occipitalis injection amount:  5 unit(s)    L superior cervical paraspinal injection amount:  10 unit(s)    R superior cervical paraspinal injection amount:  10 unit(s)    L superior trapezius injection amount:  15 unit(s)    R superior trapezius injection amount:  15 unit(s)   Total Units      Total units used:  185 (200 units 15 waste)    Total units discarded:  15   Post-procedure details      Chemodenervation:  Chronic migraine    Facial Nerve Location::  Bilateral facial nerve    Patient tolerance of procedure:  Tolerated well, no immediate complications   Comments       Rosa Elena states that previous botox injection was helpful along with her emgality monthly injections; she was doing well until last month when she feels like her emgality shot was not absorbed correctly and therefore she has been having more headaches the past month- especially in bitemporal regions.  She tolerated injections well today; plan to follow up for repeat botox in 3 months time.

## 2024-01-20 DIAGNOSIS — G47.00 INSOMNIA, UNSPECIFIED TYPE: ICD-10-CM

## 2024-01-20 DIAGNOSIS — G43.809 OTHER MIGRAINE WITHOUT STATUS MIGRAINOSUS, NOT INTRACTABLE: ICD-10-CM

## 2024-01-21 DIAGNOSIS — J01.90 ACUTE SINUSITIS, RECURRENCE NOT SPECIFIED, UNSPECIFIED LOCATION: ICD-10-CM

## 2024-01-22 RX ORDER — BUTALBITAL, ACETAMINOPHEN AND CAFFEINE 50; 325; 40 MG/1; MG/1; MG/1
TABLET ORAL
Qty: 30 TABLET | Refills: 0 | Status: SHIPPED | OUTPATIENT
Start: 2024-01-22

## 2024-01-22 RX ORDER — DEXTROMETHORPHAN HYDROBROMIDE AND PROMETHAZINE HYDROCHLORIDE 15; 6.25 MG/5ML; MG/5ML
5 SYRUP ORAL 4 TIMES DAILY PRN
Qty: 180 ML | Refills: 0 | Status: SHIPPED | OUTPATIENT
Start: 2024-01-22

## 2024-01-22 RX ORDER — ZOLPIDEM TARTRATE 6.25 MG/1
6.25 TABLET, FILM COATED, EXTENDED RELEASE ORAL
Qty: 30 TABLET | Refills: 0 | Status: SHIPPED | OUTPATIENT
Start: 2024-01-22

## 2024-01-22 NOTE — TELEPHONE ENCOUNTER
[id number] 11/27/2023 11/27/2023 Zolpidem Tartrate (Tablet, Extended Release) 30.0 30 6.25 MG NA OLIVER CHANEL [facility] Commercial [place] 0 / 0 [place]     1 [id number] 09/26/2023 09/26/2023 Zolpidem Tartrate (Tablet, Extended Release) 30.0 30 6.25 MG NA OLIVER CHANEL [facility] [place] / 0 [place]    1 [id number] 09/12/2023 09/05/2023 Acetaminophen 325 Mg / Caffeine 40 Mg Oral Tablet/ Butalbital 50 Mg (Tablet) 30.0 5 40.0 MG/50.0 MG/325.0 MG NA OLIVER DARÍO [facility] [place] 0 / 0 [place]    1 [id number] 08/02/2023 07/31/2023 Zolpidem Tartrate, Extended Release (Oral Tablet) 30.0 30 ER 6.25 MG NA OILVER DARÍO [facility] [place] 0 / 0 [place]    1 [id number] 06/27/2023 06/23/2023 Acetaminophen 325 Mg / Caffeine 40 Mg Oral Tablet/ Butalbital 50 Mg (Tablet) 30.0 5 40.0 MG/50.0 MG/325.0 MG NA OLIVER BROADT [facility] Com

## 2024-01-25 ENCOUNTER — TELEMEDICINE (OUTPATIENT)
Dept: FAMILY MEDICINE CLINIC | Facility: CLINIC | Age: 54
End: 2024-01-25
Payer: COMMERCIAL

## 2024-01-25 VITALS — BODY MASS INDEX: 22.96 KG/M2 | WEIGHT: 138 LBS

## 2024-01-25 DIAGNOSIS — J01.10 ACUTE NON-RECURRENT FRONTAL SINUSITIS: Primary | ICD-10-CM

## 2024-01-25 PROCEDURE — 99214 OFFICE O/P EST MOD 30 MIN: CPT | Performed by: NURSE PRACTITIONER

## 2024-01-25 RX ORDER — AMOXICILLIN 875 MG/1
875 TABLET, COATED ORAL 2 TIMES DAILY
Qty: 20 TABLET | Refills: 0 | Status: SHIPPED | OUTPATIENT
Start: 2024-01-25 | End: 2024-02-04

## 2024-01-25 NOTE — LETTER
January 25, 2024     Patient: Rosa Elena Piper   YOB: 1970   Date of Visit: 1/25/2024       To Whom it May Concern:    Rosa Elena Piper is currently under my professional care and was seen via telemedicine visit on 1/25/2024. Please excuse her absence from work from 01/23/2024 through 01/26/2024.  from She may return to work on 01/29/2024. .    If you have any questions or concerns, please don't hesitate to call.         Sincerely,          CAMI Oliva        CC: No Recipients

## 2024-01-25 NOTE — PROGRESS NOTES
Virtual Regular Visit    Verification of patient location:    Patient is located at Home in the following state in which I hold an active license [place]      Assessment/Plan:    Problem List Items Addressed This Visit    None  Visit Diagnoses     Acute non-recurrent frontal sinusitis    -  Primary    Relevant Medications    amoxicillin (AMOXIL) 875 mg tablet        Discussed with patient plan to treat with a 10 day course of amoxicilln  Patient instructed to call if no improvement in 72 hours or symptoms worsen         Reason for visit is   Chief Complaint   Patient presents with   • COVID-19     Tested positive on 01/21/23, still positive on test today   • Fatigue   • Earache   • Cough   • Virtual Regular Visit        Encounter provider CAMI Oliva    Provider located at [address] [place] [address]      Recent Visits  No visits were found meeting these conditions.  Showing recent visits within past 7 days and meeting all other requirements  Today's Visits  Date Type Provider Dept   01/25/24 Telemedicine CAMI Oliva [facility]   Showing today's visits and meeting all other requirements  Future Appointments  No visits were found meeting these conditions.  Showing future appointments within next 150 days and meeting all other requirements       The patient was identified by name and date of birth. Rosa Elena Piper was informed that this is a telemedicine visit and that the visit is being conducted through the Epic Embedded platform. She agrees to proceed..  My office door was closed. No one else was in the room.  She acknowledged consent and understanding of privacy and security of the video platform. The patient has agreed to participate and understands they can discontinue the visit at any time.    Patient is aware this is a billable service.     Subjective  Rosa Elena Piper is a 53 y.o. female     53 y.o.female presenting with sinus  pressure and ear pressure. She tested positive for the COVID-19 virus on 01/21/2024 and some of her symptoms like cough and sore throat are improving. She reports that she continues to test positive for the virus along with the fatigue. Her ear feeling like there is pressure behind them and her frontal sinus has pressure.        Past Medical History:   Diagnosis Date   • Anxiety    • Cervicalgia     disc displacement,radiculopathy   • Depression    • Disease of thyroid gland    • Headache(784.0)    • HL (hearing loss)    • Hypertension     last assessed: 8/2/2016   • Kidney stone 3/2021   • Migraine    • Myofascial pain syndrome    • Obesity    • Obesity (BMI 30.0-34.9)        Past Surgical History:   Procedure Laterality Date   • BREAST BIOPSY Right     biospy w/ marker   • CARPAL TUNNEL RELEASE Right    • GANGLION CYST EXCISION Left 08/03/2017    Procedure: WRIST/HAND MASS EXCISION;  Surgeon: Eron Dai MD;  Location: [facility] OR;  Service: Orthopedics   • GASTRECTOMY SLEEVE LAPAROSCOPIC     • HYSTERECTOMY         Current Outpatient Medications   Medication Sig Dispense Refill   • amoxicillin (AMOXIL) 875 mg tablet Take 1 tablet (875 mg total) by mouth 2 (two) times a day for 10 days 20 tablet 0   • B COMPLEX VITAMINS PO Take 1 tablet by mouth daily     • butalbital-acetaminophen-caffeine (FIORICET,ESGIC) -40 mg per tablet TAKE 1 TABLET EVERY 4 HOURS AS NEEDED FOR HEADACHES 30 tablet 0   • Calcium Carb-Cholecalciferol (Calcium 500 + D) 500-3.125 MG-MCG TABS Take by mouth     • cyclobenzaprine (FLEXERIL) 5 mg tablet Take 1 tablet (5 mg total) by mouth daily at bedtime 30 tablet 0   • Emgality 120 MG/ML SOAJ INJECT 1 ML UNDER THE SKIN EVERY 30 DAYS 3 mL 3   • escitalopram (LEXAPRO) 20 mg tablet TAKE 1 TABLET DAILY 90 tablet 3   • levothyroxine 25 mcg tablet TAKE 1 TABLET DAILY 90 tablet 3   • lisinopril (ZESTRIL) 10 mg tablet TAKE 1 TABLET DAILY 90 tablet 3   • Multiple Vitamins-Iron (MULTIVITAMIN/IRON  PO) Take by mouth 2 (two) times a day     • promethazine-dextromethorphan (PHENERGAN-DM) 6.25-15 mg/5 mL oral syrup Take 5 mL by mouth 4 (four) times a day as needed for cough 180 mL 0   • zolpidem (AMBIEN CR) 6.25 MG CR tablet Take 1 tablet (6.25 mg total) by mouth daily at bedtime as needed for sleep 30 tablet 0   • zonisamide (Zonegran) 100 mg capsule Take 1 capsule (100 mg total) by mouth daily at bedtime 90 capsule 2     Current Facility-Administered Medications   Medication Dose Route Frequency Provider Last Rate Last Admin   • Botulinum Toxin Type A SOLR 200 Units  200 Units Injection Q3 Months CAMI Fatima   200 Units at 12/28/23 1326        Allergies   Allergen Reactions   • Levofloxacin Anaphylaxis and Swelling     Patient reports face swelled, throat was starting to close.       Review of Systems   Constitutional:  Positive for fatigue. Negative for chills and fever.   HENT:  Positive for congestion, ear pain and sinus pressure. Negative for postnasal drip, rhinorrhea and sore throat.    Respiratory: Negative.     Cardiovascular: Negative.    Gastrointestinal: Negative.    Musculoskeletal: Negative.    Neurological:  Positive for headaches. Negative for dizziness and light-headedness.   Psychiatric/Behavioral: Negative.       Video Exam    Vitals:    01/25/24 1216   Weight: 62.6 kg (138 lb)       Physical Exam  Constitutional:       General: She is not in acute distress.     Appearance: Normal appearance. She is well-developed and well-groomed. She is not ill-appearing.   HENT:      Head: Normocephalic and atraumatic.      Right Ear: External ear normal.      Left Ear: External ear normal.      Nose: Congestion present.      Right Sinus: Frontal sinus tenderness present. No maxillary sinus tenderness.      Left Sinus: Frontal sinus tenderness present. No maxillary sinus tenderness.      Mouth/Throat:      Lips: Pink.      Mouth: Mucous membranes are moist.      Pharynx: Oropharynx is clear.   Eyes:       General: Lids are normal.      Extraocular Movements: Extraocular movements intact.      Conjunctiva/sclera: Conjunctivae normal.      Pupils: Pupils are equal, round, and reactive to light.   Neck:      Trachea: Trachea normal.   Cardiovascular:      Rate and Rhythm: Normal rate and regular rhythm.   Pulmonary:      Effort: Pulmonary effort is normal. No respiratory distress.      Breath sounds: No wheezing.   Musculoskeletal:      Cervical back: Normal range of motion.   Skin:     Coloration: Skin is not pale.   Neurological:      Mental Status: She is alert and oriented to person, place, and time.   Psychiatric:         Mood and Affect: Mood normal.         Behavior: Behavior normal. Behavior is cooperative.          Visit Time  Total Visit Duration: 12

## 2024-01-26 ENCOUNTER — TELEPHONE (OUTPATIENT)
Dept: NEUROLOGY | Facility: CLINIC | Age: 54
End: 2024-01-26

## 2024-01-26 NOTE — TELEPHONE ENCOUNTER
Rep ( Jose Ramon) from [facility]  calling to request a prior Auth be send to their pharmacy affairs dept for the patient for EMGALITY  medication     They provided a Phone # for CB with any questions  # [phone]    And a Fax # [phone]

## 2024-01-26 NOTE — TELEPHONE ENCOUNTER
Called [employer] back [phone], on extended Hold time. Never was able to speak with a Representative.  Left a VM on their Provider line with Office # and Fax #     [facility], was not successful as it told me to contact member's plan.     No [place] forms in patient's chart or Fax folder.     Will try calling [employer] again if I don't hear back from them.     Also tried [employer] [facility] Provider PA line # [phone], also on hold for extended time   Will try again at a later time.

## 2024-01-30 NOTE — TELEPHONE ENCOUNTER
Was able to get through to [employer] Rep after a 1 hour hold time.   Verbal [place] for Emgality submitted   TAT 2 Business Days.    Ref# [id number]  Will follow up on insurance determination.

## 2024-02-13 ENCOUNTER — PATIENT MESSAGE (OUTPATIENT)
Dept: NEUROLOGY | Facility: CLINIC | Age: 54
End: 2024-02-13

## 2024-02-16 ENCOUNTER — TELEPHONE (OUTPATIENT)
Dept: NEUROLOGY | Facility: CLINIC | Age: 54
End: 2024-02-16

## 2024-02-19 ENCOUNTER — NURSE TRIAGE (OUTPATIENT)
Age: 54
End: 2024-02-19

## 2024-02-19 NOTE — TELEPHONE ENCOUNTER
"Patient c/o sinus pressure and congestion for the past few weeks. She was seen by PCP on 1/25 for the same symptoms. She was prescribed amoxicillin. Patient denies fever, denies trouble breathing. She is taking tylenol sinus relief for symptoms. Patient wants to know if PCP can prescribe anything else for her symptoms at this time. Please follow up.   Reason for Disposition   [1] Sinus congestion (pressure, fullness) AND [2] present > 10 days    Answer Assessment - Initial Assessment Questions  1. LOCATION: \"Where does it hurt?\"       Sinus pressure and congestion    2. ONSET: \"When did the sinus pain start?\"  (e.g., hours, days)       Ongoing for the past 3 weeks    3. SEVERITY: \"How bad is the pain?\"   (Scale 1-10; mild, moderate or severe)    - MILD (1-3): doesn't interfere with normal activities     - MODERATE (4-7): interferes with normal activities (e.g., work or school) or awakens from sleep            4. RECURRENT SYMPTOM: \"Have you ever had sinus problems before?\" If Yes, ask: \"When was the last time?\" and \"What happened that time?\"         5. NASAL CONGESTION: \"Is the nose blocked?\" If Yes, ask: \"Can you open it or must you breathe through the mouth?\"      Yes    7. FEVER: \"Do you have a fever?\" If Yes, ask: \"What is it, how was it measured, and when did it start?\"       Denies          9. PREGNANCY: \"Is there any chance you are pregnant?\" \"When was your last menstrual period?\"      N/A    Protocols used: Sinus Pain or Congestion-ADULT-AH    "

## 2024-02-20 ENCOUNTER — TELEPHONE (OUTPATIENT)
Age: 54
End: 2024-02-20

## 2024-02-20 DIAGNOSIS — J01.90 ACUTE SINUSITIS, RECURRENCE NOT SPECIFIED, UNSPECIFIED LOCATION: ICD-10-CM

## 2024-02-20 RX ORDER — PREDNISONE 10 MG/1
10 TABLET ORAL DAILY
Qty: 26 TABLET | Refills: 0 | Status: SHIPPED | OUTPATIENT
Start: 2024-02-20

## 2024-02-20 RX ORDER — AZITHROMYCIN 250 MG/1
TABLET, FILM COATED ORAL
Qty: 6 TABLET | Refills: 0 | Status: SHIPPED | OUTPATIENT
Start: 2024-02-20 | End: 2024-02-24

## 2024-02-20 NOTE — TELEPHONE ENCOUNTER
Pharmacist called and stated the medication  prednisone and azithromycin was [facility] .[facility] is going to cancel the scripts.Can you please resend scripts to  [facility],

## 2024-02-22 DIAGNOSIS — J01.90 ACUTE SINUSITIS, RECURRENCE NOT SPECIFIED, UNSPECIFIED LOCATION: ICD-10-CM

## 2024-02-23 RX ORDER — DEXTROMETHORPHAN HYDROBROMIDE AND PROMETHAZINE HYDROCHLORIDE 15; 6.25 MG/5ML; MG/5ML
5 SYRUP ORAL 4 TIMES DAILY PRN
Qty: 180 ML | Refills: 0 | Status: SHIPPED | OUTPATIENT
Start: 2024-02-23

## 2024-02-26 ENCOUNTER — TELEPHONE (OUTPATIENT)
Dept: NEUROLOGY | Facility: CLINIC | Age: 54
End: 2024-02-26

## 2024-02-26 NOTE — TELEPHONE ENCOUNTER
Patient wants to go back to see Rosmery. She was not happy with the care from [facility]. She is scheduled with Rosmery on 4/26/24 at 7:30.

## 2024-03-11 ENCOUNTER — TELEPHONE (OUTPATIENT)
Age: 54
End: 2024-03-11

## 2024-03-11 NOTE — TELEPHONE ENCOUNTER
Cyst in eye on started Thursday (5th) and not getting better.  She wants to only see Dr. Hunt.  Dr. Hunt does not have availability until late next week.  (Earlier in day or later in day would be best for patient)    Would Dr. Hunt be willing to overbook?    Thank you

## 2024-03-13 ENCOUNTER — OFFICE VISIT (OUTPATIENT)
Dept: FAMILY MEDICINE CLINIC | Facility: CLINIC | Age: 54
End: 2024-03-13
Payer: COMMERCIAL

## 2024-03-13 VITALS
TEMPERATURE: 97.5 F | HEART RATE: 75 BPM | OXYGEN SATURATION: 98 % | BODY MASS INDEX: 23.82 KG/M2 | WEIGHT: 143 LBS | HEIGHT: 65 IN | DIASTOLIC BLOOD PRESSURE: 80 MMHG | SYSTOLIC BLOOD PRESSURE: 112 MMHG

## 2024-03-13 DIAGNOSIS — H11.441 CONJUNCTIVAL CYST OF RIGHT EYE: Primary | ICD-10-CM

## 2024-03-13 PROCEDURE — 99214 OFFICE O/P EST MOD 30 MIN: CPT | Performed by: NURSE PRACTITIONER

## 2024-03-13 RX ORDER — NEOMYCIN SULFATE, POLYMYXIN B SULFATE AND DEXAMETHASONE 3.5; 10000; 1 MG/ML; [USP'U]/ML; MG/ML
1 SUSPENSION/ DROPS OPHTHALMIC
Qty: 5 ML | Refills: 0 | Status: SHIPPED | OUTPATIENT
Start: 2024-03-13 | End: 2024-03-18

## 2024-03-13 RX ORDER — TOBRAMYCIN 3 MG/ML
1 SOLUTION/ DROPS OPHTHALMIC
Qty: 5 ML | Refills: 0 | Status: SHIPPED | OUTPATIENT
Start: 2024-03-13 | End: 2024-03-13 | Stop reason: ALTCHOICE

## 2024-03-13 NOTE — PROGRESS NOTES
"Assessment/Plan:     Diagnoses and all orders for this visit:    Conjunctival cyst of right eye  -     neomycin-polymyxin-dexamethasone (MAXITROL) ophthalmic suspension; Administer 1 drop to the right eye every 4 (four) hours while awake for 5 days    Other orders  -     Discontinue: tobramycin (TOBREX) 0.3 % SOLN; Administer 1 drop to both eyes every 4 (four) hours while awake for 5 days        Discussed with patient plan to treat with neomycin-polymyxin-dexamethasone (Maxitrol) drop to right eye for 5 days  Patient instructed to call if no improvement in 72 hours or symptoms worsen    Subjective:      Patient ID: Rosa Elena Piper is a 53 y.o. female.    53 y.o.female presenting with a clear cyst in the conjunctiva of right eye that is slightly itchy but not painful for the past few days. She is concerned about another cyst developing in her left eye because that is becoming itchy. She denies visual disturbance.      The following portions of the patient's history were reviewed and updated as appropriate: allergies, current medications, past family history, past medical history, past social history, past surgical history, and problem list.    Review of Systems   Constitutional: Negative.    Eyes:  Positive for redness and itching. Negative for pain and discharge.        Cyst in right side of conjunctiva   Respiratory: Negative.     Cardiovascular: Negative.    Musculoskeletal: Negative.    Neurological: Negative.    Psychiatric/Behavioral: Negative.         Objective:    /80   Pulse 75   Temp 97.5 °F (36.4 °C)   Ht 5' 5\" (1.651 m)   Wt 64.9 kg (143 lb)   LMP 11/18/2015   SpO2 98%   BMI 23.80 kg/m² (Reviewed)     Physical Exam  Vitals reviewed.   Constitutional:       General: She is not in acute distress.     Appearance: She is well-developed and well-groomed. She is not ill-appearing.   HENT:      Head: Normocephalic and atraumatic.   Eyes:      General: Lids are normal. Vision grossly intact. Gaze " aligned appropriately.      Extraocular Movements: Extraocular movements intact.      Conjunctiva/sclera: Conjunctivae normal.      Pupils: Pupils are equal, round, and reactive to light.     Neck:      Trachea: Trachea and phonation normal.   Cardiovascular:      Rate and Rhythm: Normal rate and regular rhythm.   Pulmonary:      Effort: Pulmonary effort is normal.   Skin:     General: Skin is warm and dry.      Capillary Refill: Capillary refill takes less than 2 seconds.   Neurological:      General: No focal deficit present.      Mental Status: She is alert and oriented to person, place, and time.   Psychiatric:         Mood and Affect: Mood normal.         Behavior: Behavior normal. Behavior is cooperative.

## 2024-03-17 DIAGNOSIS — G47.00 INSOMNIA, UNSPECIFIED TYPE: ICD-10-CM

## 2024-03-18 RX ORDER — ZOLPIDEM TARTRATE 6.25 MG/1
6.25 TABLET, FILM COATED, EXTENDED RELEASE ORAL
Qty: 30 TABLET | Refills: 0 | Status: SHIPPED | OUTPATIENT
Start: 2024-03-18

## 2024-03-18 NOTE — TELEPHONE ENCOUNTER
1 [id number] 01/24/2024 01/22/2024 Acetaminophen 325 Mg / Caffeine 40 Mg Oral Tablet/ Butalbital 50 Mg (Tablet) 30.0 5 40.0 MG/50.0 MG/325.0 MG NA OLIVER DARÍO [facility] Commercial Insurance 0 / 0 [place]    2 [id number] 01/22/2024 01/22/2024 Zolpidem Tartrate (Tablet, Extended Release) 30.0 30 6.25 MG NA OLIVER CHANEL [facility] Commercial Insurance 0 / 0 [place]    1 [id number] 11/27/2023 11/27/2023 Zolpidem Tartrate (Tablet, Extended Release) 30.0 30 6.25 MG NA OLIVER CHANEL [facility], [facility] 0 / 0 [place]    1 [id number] 09/26/2023 09/26/2023 Zolpidem Tartrate (Tablet, Extended Release) 30.0 30 6.25 MG NA OLIVER CHANEL [facility], [facility]. Commercial Insurance 0 / 0 [place]    1 [id number] 09/12/2023 09/05/2023 Acetaminophen 325 Mg / Caffeine 40 Mg Oral Tablet/ Butalbital 50 Mg (Tablet) 30.0 5 40.0 MG/50.0 MG/325.0 MG NA OLIVER DARÍO [facility]. Commercial Insurance 0 / 0 [place]    1 [id number] 08/02/2023 07/31/2023 Zolpidem Tartrate, Extended Release (Oral Tablet) 30.0 30 ER 6.25 MG NA OLIVER BROADT [facility] Commercial Insurance 0 / 0 [place]    1 [id number] 06/27/2023 06/23/2023 Acetaminophen 325 Mg / Caffeine 40 Mg Oral Tablet/ Butalbital 50 Mg (Tablet) 30.0 5 40.0 MG/50.0 MG/325.0 MG NA OLIVER BROADT [facility]. Commercial Insurance 0 / 0 [place]    1 [id number] 05/27/2023 05/26/2023 Zolpidem Tartrate, Extended Release (Oral Tablet) 30.0 30 ER 6.25 MG NA OLIVER BROADT [facility], [facility]. Commercial Insurance 0 / 0 [place]    1 [id number] 03/21/2023 03/20/2023 Acetaminophen 325 Mg / Caffeine 40 Mg Oral Tablet/ Butalbital 50 Mg (Tablet) 30.0 5 40.0 MG/50.0 MG/325.0 MG NA OLIVER CHANEL [facility] Commercial Insurance 0 / 0 [place]    1 [id number] 03/20/2023 03/20/2023 Zolpidem Tartrate, Extended Release (Oral Tablet) 30.0 30 ER 6.25 MG NA OLIVER CHANEL [facility], [facility].  Commercial Ins

## 2024-03-19 ENCOUNTER — TELEPHONE (OUTPATIENT)
Dept: NEUROLOGY | Facility: CLINIC | Age: 54
End: 2024-03-19

## 2024-03-19 NOTE — TELEPHONE ENCOUNTER
Submitted Re-Authorization request via fax to [employer] for:    Botox-200 units. QTY:1, Q3 Months.  DX:G43.709, Chronic Migraine.  Marked-URGENT!    Awaiting approval/denial response.    Will follow up on the status of pending authorization requested.

## 2024-03-20 NOTE — TELEPHONE ENCOUNTER
Called [facility] and spoke with Tru regarding the delivery status of patients Botox medication. Tru informed me that patients account is being processed with the insurance review department and that she did send a message requesting this be expedited as STAT.     Will follow up w/ [facility] again on the status of this order.

## 2024-03-26 NOTE — TELEPHONE ENCOUNTER
Called [facility] and scheduled delivery with Gerda for:     Botox-200 units  Qty:1  Delivery to [place]  Scheduled for 03/27/2024  Via: FedEx     Please advise if medication doesn't arrive.

## 2024-03-27 NOTE — TELEPHONE ENCOUNTER
Called patient and made her aware of the delivery status. Patient was understanding and appreciative of phone call and just requested I call irwin in the morning with an update and if tomorrows ERUM-Tamarat is needed to be rescheduled.

## 2024-03-27 NOTE — TELEPHONE ENCOUNTER
Called [facility] and spoke to Laura regarding patients Botox order and why it did not arrive as scheduled. Laura apologized and stated that patients delivery was placed on hold and switched back to the insurance review department. Laura is sending a message to have this patients Botox order completed and scheduled asap. There is possibility that delivery can still arrive tomorrow in the AM.     Will follow up w/ [facility] fist this tomorrow on the status of this order.     If delivery still not completed by tomorrow morning then patient will need to be rescheduled. Thank you!

## 2024-03-28 NOTE — TELEPHONE ENCOUNTER
Called [facility] and spoke with Denisse regarding the delivery status of patients Botox medication. Denisse informed me that patients account is still being processed with the insurance review department at this time. I explained that I had called multiple times on this patients Botox order and requested for this to be reviewed urgently. Denisse stated that there is a STAT request in but the order has not yet been processed for completion as of yet. Denisse did send a 3rd message to have this patients account worked on STAT and also noted that patients appointment is needing to be scheduled ASAP as she was supposed to have her Botox today at 3:00PM! Denisse suggested I call back later today for updated delivery status as this should be ready to schedule.      Will follow up w/ [facility] again on the status of this order later on this afternoon.    Called patient and made her aware that her Botox was not shipped out and her BINJ-Appt will need to rescheduled due to pending delivery w/ [facility]. Patient was understanding and appreciative for the phone call.

## 2024-03-29 NOTE — TELEPHONE ENCOUNTER
Called [facility] and scheduled delivery with Laura for:     Botox-200 units  Qty:1  Delivery to [place]  Scheduled for 04/02/2024  Via: FedEx     Please advise if medication doesn't arrive.

## 2024-04-16 ENCOUNTER — TELEPHONE (OUTPATIENT)
Dept: NEUROLOGY | Facility: CLINIC | Age: 54
End: 2024-04-16

## 2024-04-26 ENCOUNTER — OFFICE VISIT (OUTPATIENT)
Dept: NEUROLOGY | Facility: CLINIC | Age: 54
End: 2024-04-26
Payer: COMMERCIAL

## 2024-04-26 ENCOUNTER — TELEPHONE (OUTPATIENT)
Dept: NEUROLOGY | Facility: CLINIC | Age: 54
End: 2024-04-26

## 2024-04-26 VITALS
DIASTOLIC BLOOD PRESSURE: 73 MMHG | HEART RATE: 71 BPM | TEMPERATURE: 98.3 F | WEIGHT: 145.3 LBS | BODY MASS INDEX: 24.21 KG/M2 | SYSTOLIC BLOOD PRESSURE: 128 MMHG | HEIGHT: 65 IN

## 2024-04-26 DIAGNOSIS — G43.109 MIGRAINE WITH AURA AND WITHOUT STATUS MIGRAINOSUS, NOT INTRACTABLE: ICD-10-CM

## 2024-04-26 DIAGNOSIS — G43.709 CHRONIC MIGRAINE WITHOUT AURA WITHOUT STATUS MIGRAINOSUS, NOT INTRACTABLE: Primary | ICD-10-CM

## 2024-04-26 PROCEDURE — 99215 OFFICE O/P EST HI 40 MIN: CPT | Performed by: PHYSICIAN ASSISTANT

## 2024-04-26 RX ORDER — ZONISAMIDE 100 MG/1
100 CAPSULE ORAL
Qty: 90 CAPSULE | Refills: 3 | Status: SHIPPED | OUTPATIENT
Start: 2024-04-26

## 2024-04-26 RX ORDER — DIHYDROERGOTAMINE MESYLATE 4 MG/ML
0.72 SPRAY, METERED NASAL AS NEEDED
Qty: 8 ML | Refills: 11 | Status: SHIPPED | OUTPATIENT
Start: 2024-04-26

## 2024-04-26 RX ORDER — GALCANEZUMAB 120 MG/ML
1 INJECTION, SOLUTION SUBCUTANEOUS
Qty: 3 ML | Refills: 3 | Status: SHIPPED | OUTPATIENT
Start: 2024-04-26

## 2024-04-26 NOTE — TELEPHONE ENCOUNTER
Called summer to have patient's Botox brought over from [place] to [place]. Please pack up Botox.   Thank you.

## 2024-04-26 NOTE — TELEPHONE ENCOUNTER
Botox did arrive     200 units [facility]  LOT# [id number]  NDC# [id number]  EXP- 06/01/2026

## 2024-04-26 NOTE — PROGRESS NOTES
[facility]  PATIENT:  Rosa Elena Piper  MRN:  [id number]  :  1970  DATE OF SERVICE:  2024      Assessment/Plan:     Chronic migraine without aura without status migrainosus, not intractable  Preventative  Continue Zonisamide 100 mg at bedtime  Continue Magnesium, Vit D, Vit B2, CoQ10  Emgality 1 injection every 28 days  Continue Botox every 3 months    Abortive  At onset of migraine, take ubrelvy 100 mg.  May repeat in 2 hours if needed.  LImit of 200 mg in 24 hours  May use Trudhesa either alone or with the ubrlevy.  May repeat in 1-2 hours if needed.  Limit of 8 a month  May use your fioricet if above fails    If unable to break migraine in 24 hours, call us         Problem List Items Addressed This Visit          Cardiovascular and Mediastinum    Chronic migraine without aura without status migrainosus, not intractable - Primary     Preventative  Continue Zonisamide 100 mg at bedtime  Continue Magnesium, Vit D, Vit B2, CoQ10  Emgality 1 injection every 28 days  Continue Botox every 3 months    Abortive  At onset of migraine, take ubrelvy 100 mg.  May repeat in 2 hours if needed.  LImit of 200 mg in 24 hours  May use Trudhesa either alone or with the ubrlevy.  May repeat in 1-2 hours if needed.  Limit of 8 a month  May use your fioricet if above fails    If unable to break migraine in 24 hours, call us         Relevant Medications    Galcanezumab-gnlm (Emgality) 120 MG/ML SOAJ    zonisamide (Zonegran) 100 mg capsule    Dihydroergotamine Mesylate HFA (Trudhesa) 0.725 MG/ACT AERS    Ubrogepant (UBRELVY) 100 MG tablet    Migraine with aura and without status migrainosus, not intractable    Relevant Medications    Galcanezumab-gnlm (Emgality) 120 MG/ML SOAJ    zonisamide (Zonegran) 100 mg capsule    Dihydroergotamine Mesylate HFA (Trudhesa) 0.725 MG/ACT AERS    Ubrogepant (UBRELVY) 100 MG tablet           History of Present Illness:   We had the pleasure of evaluating Rosa Elena DOMINGUEZ  Feliz in neurological follow up  today for headaches.  As you know,  she is a 53 y.o.  right handed female.  Patient is a principal at [employer].       Current medical illnesses:  QTc 8/17/2020 426 ms     What medications do you take or have you taken for your headaches?   Current preventive:   Vit D, CoQ10, Magnesium, B2  Lexapro  Zonisamide  Emgality  Lisinopril  Botox    Current Abortive:   Fioricet    Prior Preventive:   Topamax, gabapentin  Amrix, Tizanidine,   Benacar, Amlodipine  Olanzapine,Amitriptyline,  Cyclobenzaprine    Prior Abortive:   Rizatriptan, Imitrex,   Toradol,   Dexamethasone,   rizatriptan,   olanzapine,   ondansetron, Reglan, Prochloraperzine   Nurtec     Interval update as of 4/26/2024:  Patient reports does very well when she is able to receive her Emgality and botox in a timely manner.  When she misses or is late then her headaches worsen    Interval updates as of 3/9/2021:  Patient states that she did have some pain in her scalp after the last Botox injections. She did have her hair colored a few days after the injections.  Patient states that she doesn't feel her headaches are optimally controlled at this point.  Still has migraines which are severe and intense.     What is your current pain level ?  5/10    How often do the headaches occur?   TTH: 1 times a week; was daily before BOTOX  Migraines: 1 a month with BOTOX and emgality, prior was daily headaches with 16+ migraine days a month     Are you ever headache free? yes     Headache history with updates:  Alternative therapies used in the past for headaches? Massage, chiropractor  Headache are worse if the patient: cough, sneeze, bending over, Exertion  Headache triggers:  Fatigue, Stress/tension, lack of food, heat     Aura/warning and how long does it last ? yes:  scatoma in her vision once a month 20 minutes but no migraine pain       What time of the day do the headaches start? mid day     How long do the headaches last?   TTH: rest of  the day  Migraines: 1 day to 3 days     Describe your usual headache ?  TTH: pressure, throbbing  Migraines: pressure, throbbing, increase in pain, stabbing     Where is your headache located?    Right > left Frontal, Occipital and Retro-orbital      What is the intensity of pain?   TTH: 4-7/10  Migrianes: 8/10     Associated symptoms:   Decrease of appetite, nausea, vomiting,   Photophobia, phonophobia, sensitivity to smell   Problem with concentration  Decreased social functioning  Tinnitus, light-headed or dizzy, stiff or sore neck,   prefer to be alone and in a dark room, unable to work     Number of days missed per month because of headaches:  Work (or school) days: would miss 1 a month if didn't push through  Social or Family activities: 1 a month     What time of the year do headaches occur more frequently? 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 Yes, PCP  Have you had trigger point injection performed and how often? No  Have you had Botox injection performed and how often? Yes   Have you had epidural injections or transforaminal injections performed? Yes     Have you used CBD or THC for your headaches and how often? Yes, tried CBD oil but didn't help     Are you current pregnant or planning on getting pregnant? No, hysterectomy     Have you ever had any Brain imaging? yes      2013 CT Head  Normal     12/5/2020 MRI C spine  Cervical degenerative disc disease with disc herniations and endplate/uncinate joint hypertrophic changes C4-5, C5-6 and C6-7.  Stable canal stenosis and foraminal narrowing.     At C2-3 and C3-4 there is right facet hypertrophic degenerative change resulting in foraminal narrowing.  These changes have progressed since the prior examination with worsening right-sided foraminal narrowing at these levels.    5/18/2023 MRI C-spine  Multilevel spondylotic changes are similar to the prior study, most pronounced at C5-C6. No cord  signal abnormality.    I personally reviewed these images.     Reviewed old notes from physician seen in the past- see above HPI for summary of previous encounters.       With botox has had a reduction of at least 7 migraine days with less abortive medication, less ER visits which correlates to headache diary        Past Medical History:   Diagnosis Date    Anxiety     Cervicalgia     disc displacement,radiculopathy    CTS (carpal tunnel syndrome)     Depression     Disease of thyroid gland     Headache(784.0)     Headache, tension-type     HL (hearing loss)     Hypertension     last assessed: 8/2/2016    Kidney stone 3/2021    Migraine     Myofascial pain syndrome     Obesity     Obesity (BMI 30.0-34.9)        Patient Active Problem List   Diagnosis    Chronic migraine without aura without status migrainosus, not intractable    Neck pain    Allergic rhinitis    Atypical ductal hyperplasia of breast    Hypothyroidism    Herniated cervical disc    Weight gain due to medication    Hypertension    DDD (degenerative disc disease), cervical    Strain of tendon of right rotator cuff    SAMMY (obstructive sleep apnea)    Class 1 obesity    Anxiety    Radiculopathy, cervical region    Cervical disc disorder with radiculopathy of mid-cervical region    Migraine with aura and without status migrainosus, not intractable       Medications:      Current Outpatient Medications   Medication Sig Dispense Refill    B COMPLEX VITAMINS PO Take 1 tablet by mouth daily      butalbital-acetaminophen-caffeine (FIORICET,ESGIC) -40 mg per tablet TAKE 1 TABLET EVERY 4 HOURS AS NEEDED FOR HEADACHES 30 tablet 0    Calcium Carb-Cholecalciferol (Calcium 500 + D) 500-3.125 MG-MCG TABS Take 1 tablet by mouth 2 (two) times a day With Magnesium      Dihydroergotamine Mesylate HFA (Trudhesa) 0.725 MG/ACT AERS 0.725 mg into each nostril if needed (migraine) Use at onset of migraine.  May repeat in 1 hour if needed.  Limit of 3 a week or 8 a month.   Do not use within 24 hours of a triptan. 8 mL 11    escitalopram (LEXAPRO) 20 mg tablet TAKE 1 TABLET DAILY 90 tablet 3    Galcanezumab-gnlm (Emgality) 120 MG/ML SOAJ Inject 1 mL under the skin every 28 days 3 mL 3    levothyroxine 25 mcg tablet TAKE 1 TABLET DAILY 90 tablet 3    lisinopril (ZESTRIL) 10 mg tablet TAKE 1 TABLET DAILY 90 tablet 3    Multiple Vitamins-Iron (MULTIVITAMIN/IRON PO) Take 1 tablet by mouth 2 (two) times a day      Ubrogepant (UBRELVY) 100 MG tablet Take 1 tablet (100 mg) one time as needed for migraine. May repeat one additional tablet (100 mg) at least two hours after the first dose. Do not use more than two doses per day (200mg) 16 tablet 3    zolpidem (AMBIEN CR) 6.25 MG CR tablet Take 1 tablet (6.25 mg total) by mouth daily at bedtime as needed for sleep 30 tablet 0    zonisamide (Zonegran) 100 mg capsule Take 1 capsule (100 mg total) by mouth daily at bedtime 90 capsule 3     Current Facility-Administered Medications   Medication Dose Route Frequency Provider Last Rate Last Admin    Botulinum Toxin Type A SOLR 200 Units  200 Units Injection Q3 Months CAMI Fatima   200 Units at 12/28/23 1326        Allergies:      Allergies   Allergen Reactions    Levofloxacin Anaphylaxis and Swelling     Patient reports face swelled, throat was starting to close.       Family History:     Family History   Problem Relation Age of Onset    Hypertension Mother     Other Mother         Bladder surgery    Obesity Mother     Cancer Mother 58        abdominal cancer    Colon cancer Mother     Diabetes Father     Hypertension Father     Heart disease Father     Obesity Father     Leukemia Sister     Obesity Sister     No Known Problems Maternal Aunt     No Known Problems Maternal Aunt     Cancer Maternal Grandmother 70        Bladder    Dementia Maternal Grandfather     Leukemia Paternal Grandmother     No Known Problems Paternal Grandfather     No Known Problems Son     No Known Problems Son      "Stroke Neg Hx        Social History:     Social History     Socioeconomic History    Marital status: /Civil Union     Spouse name: Not on file    Number of children: Not on file    Years of education: Not on file    Highest education level: Not on file   Occupational History    Not on file   Tobacco Use    Smoking status: Never     Passive exposure: Never    Smokeless tobacco: Never   Vaping Use    Vaping status: Never Used   Substance and Sexual Activity    Alcohol use: No     Comment: per [employer]-drinks socially    Drug use: No    Sexual activity: Yes     Partners: Male     Birth control/protection: Female Sterilization   Other Topics Concern    Not on file   Social History Narrative    Denied coffee, cola, tea consumption per [employer]     Social Determinants of Health     Financial Resource Strain: Not on file   Food Insecurity: Not on file   Transportation Needs: Not on file   Physical Activity: Not on file   Stress: Not on file   Social Connections: Not on file   Intimate Partner Violence: Not on file   Housing Stability: Not on file      I have reviewed the patient's medical, social and surgical history as well as medications in detail and updated the computerized patient record.      Objective:   Physical Exam:                                                                   Vitals:            /73 (BP Location: Left arm, Patient Position: Sitting, Cuff Size: Standard)   Pulse 71   Temp 98.3 °F (36.8 °C) (Temporal)   Ht 5' 5\" (1.651 m)   Wt 65.9 kg (145 lb 4.8 oz)   LMP 11/18/2015   BMI 24.18 kg/m²   BP Readings from Last 3 Encounters:   04/26/24 128/73   03/13/24 112/80   12/28/23 104/71     Pulse Readings from Last 3 Encounters:   04/26/24 71   03/13/24 75   12/28/23 69          CONSTITUTIONAL: Well developed, well nourished, well groomed. No dysmorphic features.     Eyes:  EOM normal      Neck:  Normal ROM, neck supple.      HEENT:  Normocephalic atraumatic.    Chest:  Respirations " regular and unlabored.    Psychiatric:  Normal behavior and appropriate affect      MENTAL STATUS  Orientation: Alert and oriented x 3  Fund of knowledge: Intact.    MOTOR (Upper and lower extremities)   Bulk/tone/abnormal movement: Normal muscle bulk and tone.      COORDINATION   Station/Gait: Normal baseline gait.         Review of Systems:   Review of Systems  Constitutional: Negative.    HENT: Negative.     Eyes: Negative.    Respiratory: Negative.     Cardiovascular: Negative.    Gastrointestinal: Negative.    Endocrine: Negative.    Genitourinary: Negative.    Musculoskeletal: Negative.    Skin: Negative.    Allergic/Immunologic: Negative.    Neurological:  Positive for headaches.   Hematological: Negative.    Psychiatric/Behavioral: Negative.        I personally reviewed the ROS entered by the MA      I have spent a total time of 47 minutes on 04/26/24 in caring for this patient including Prognosis, Risks and benefits of tx options, Instructions for management, Patient and family education, Importance of tx compliance, Risk factor reductions, Impressions, Counseling / Coordination of care, Documenting in the medical record, Reviewing / ordering tests, medicine, procedures  , and Obtaining or reviewing history  .      Author:  Rosmery Geronimo PA-C 4/26/2024 2:19 PM

## 2024-04-26 NOTE — PATIENT INSTRUCTIONS
Preventative  Continue Zonisamide 100 mg at bedtime  Continue Magnesium, Vit D, Vit B2, CoQ10  Emgality 1 injection every 28 days  Continue Botox every 3 months    Abortive  At onset of migraine, take ubrelvy 100 mg.  May repeat in 2 hours if needed.  Limit of 200 mg in 24 hours  May use Trudhesa either alone or with the ubrlevy.  May repeat in 1-2 hours if needed.  Limit of 8 a month  May use your fioricet if above fails    If unable to break migraine in 24 hours, call us    Neck pain:   - We discussed the role of neck pathology and poor posture, with straightening of the normal cervical lordosis, in headaches. We discussed how tightening of the neck muscles can irritate the nerves in the occipital region of her head and cause or worsen head pain. We also discussed and demonstrated neck strengthening and relaxation exercises, as well as giving written instructions on these exercises.     - We talked about the importance of good posture for improving shoulder, neck, and head pain. The patient was given visualization exercises for correcting posture, which patient will practice at home. If this simple exercise does not help improve the posture, we will consider formal physical therapy in the future.     Medication overuse headaches:   - We discussed medication overuse headache (MOH) and how to avoid it in the future. It was explained that all analgesics have the potential to cause medication overuse headache (MOH) and analgesic overuse can negate the effectiveness of headache preventive measures. After successful MOH treatment, preventive medications for an underlying primary headache disorder have a greater chance for success. Avoid medications with narcotics, barbiturates, or caffeine in them as these can cause rebound headaches after very few doses and can interfere with other headache medicine efficacy. Taking any analgesics for more than 2-3 days a week can cause medication overuse headache.     Reproductive age  "women: Should take folic acid daily when taking anti-seizure drugs especially Depakote.     [place] Prescription Drug Monitoring Program report was reviewed and was appropriate      Headache management instructions  - When patient has a moderate to severe headache, they should seek rest, initiate relaxation and apply cold compresses to the head.   - Maintain regular sleep schedule. Adults need at least 7-8 hours of uninterrupted a night.   - Limit over the counter medications such as Tylenol, Ibuprofen, Aleve, Excedrin. (No more than 3 times a week).  - Maintain headache diary.  We discussed an [employer] for a smart phone is \"Migraine yordy\"  - Limit caffeine to 1-2 cups 8 to 16 oz a day or less.  - Avoid dietary trigger. (aged cheese, peanuts, MSG, aspartame and nitrates).  - Patient is to have regular frequent meals to prevent headache onset.    - Please drink at least 64 ounces of water a day to help remain hydrated.    Please call with any questions or concerns. Office number is [phone]    "

## 2024-04-26 NOTE — ASSESSMENT & PLAN NOTE
Preventative  Continue Zonisamide 100 mg at bedtime  Continue Magnesium, Vit D, Vit B2, CoQ10  Emgality 1 injection every 28 days  Continue Botox every 3 months    Abortive  At onset of migraine, take ubrelvy 100 mg.  May repeat in 2 hours if needed.  LImit of 200 mg in 24 hours  May use Trudhesa either alone or with the ubrlevy.  May repeat in 1-2 hours if needed.  Limit of 8 a month  May use your fioricet if above fails    If unable to break migraine in 24 hours, call us

## 2024-04-26 NOTE — TELEPHONE ENCOUNTER
Dwaine. Called  service to [employer]  . He will be here by any minute shipment from [place] to .

## 2024-04-30 ENCOUNTER — PROCEDURE VISIT (OUTPATIENT)
Dept: NEUROLOGY | Facility: CLINIC | Age: 54
End: 2024-04-30
Payer: COMMERCIAL

## 2024-04-30 VITALS — TEMPERATURE: 98.2 F | SYSTOLIC BLOOD PRESSURE: 116 MMHG | DIASTOLIC BLOOD PRESSURE: 76 MMHG | HEART RATE: 62 BPM

## 2024-04-30 DIAGNOSIS — G43.709 CHRONIC MIGRAINE WITHOUT AURA WITHOUT STATUS MIGRAINOSUS, NOT INTRACTABLE: Primary | ICD-10-CM

## 2024-04-30 PROCEDURE — 64615 CHEMODENERV MUSC MIGRAINE: CPT | Performed by: PHYSICIAN ASSISTANT

## 2024-04-30 NOTE — PROGRESS NOTES
"Universal Protocol   Consent: Verbal consent obtained. Written consent obtained.  Risks and benefits: risks, benefits and alternatives were discussed  Consent given by: patient  Time out: Immediately prior to procedure a \"time out\" was called to verify the correct patient, procedure, equipment, support staff and site/side marked as required.  Patient understanding: patient states understanding of the procedure being performed  Patient consent: the patient's understanding of the procedure matches consent given  Procedure consent: procedure consent matches procedure scheduled  Relevant documents: relevant documents present and verified  Patient identity confirmed: verbally with patient      Chemodenervation     Date/Time  4/30/2024 9:30 AM     Performed by  Rosmery Geronimo PA-C   Authorized by  Rosmery Geronimo PA-C     Pre-procedure details      Prepped With: Alcohol     Anesthesia  (see MAR for exact dosages):     Anesthesia method:  None   Procedure details      Position:  Upright   Botox      Botox Type:  Type A    Brand:  Botox    mL's of Botulinum Toxin:  200    Final Concentration per CC:  50 units    Needle Gauge:  30 G 2.5 inch   Procedures      Botox Procedures: chronic headache      Indications: migraines     Injection Location      Head / Face:  L superior cervical paraspinal, R superior cervical paraspinal, L , R , L frontalis, R frontalis, L medial occipitalis, R medial occipitalis, procerus, R temporalis, L temporalis, R superior trapezius and L superior trapezius    L  injection amount:  5 unit(s)    R  injection amount:  5 unit(s)    L lateral frontalis:  5 unit(s)    R lateral frontalis:  5 unit(s)    L medial frontalis:  5 unit(s)    R medial frontalis:  5 unit(s)    L temporalis injection amount:  20 unit(s)    R temporalis injection amount:  20 unit(s)    Procerus injection amount:  5 unit(s)    L medial occipitalis injection amount:  15 unit(s)    R medial " occipitalis injection amount:  15 unit(s)    L superior cervical paraspinal injection amount:  10 unit(s)    R superior cervical paraspinal injection amount:  10 unit(s)    L superior trapezius injection amount:  15 unit(s)    R superior trapezius injection amount:  15 unit(s)   Total Units      Total units used:  200    Total units discarded:  0   Post-procedure details      Chemodenervation:  Chronic migraine    Facial Nerve Location::  Bilateral facial nerve    Patient tolerance of procedure:  Tolerated well, no immediate complications   Comments       10 units temporalis bilaterally  5 units lower temporalis bilaterally  15 units occipitalis/cervical paraspinal  All medically necessary

## 2024-05-02 DIAGNOSIS — G43.809 OTHER MIGRAINE WITHOUT STATUS MIGRAINOSUS, NOT INTRACTABLE: ICD-10-CM

## 2024-05-03 RX ORDER — BUTALBITAL, ACETAMINOPHEN AND CAFFEINE 50; 325; 40 MG/1; MG/1; MG/1
1 TABLET ORAL EVERY 4 HOURS PRN
Qty: 30 TABLET | Refills: 0 | Status: SHIPPED | OUTPATIENT
Start: 2024-05-03

## 2024-05-03 NOTE — TELEPHONE ENCOUNTER
1 [id number] 03/18/2024 03/18/2024 Zolpidem Tartrate (Tablet, Extended Release) 30.0 30 6.25 MG NA LOVELY GRAHAM [facility] Commercial Insurance 0 / 0 [place]    2 [id number] 01/24/2024 01/22/2024 Acetaminophen 325 Mg / Caffeine 40 Mg Oral Tablet/ Butalbital 50 Mg (Tablet) 30.0 5 40.0 MG/50.0 MG/325.0 MG NA OLIVER CHANEL [facility] Commercial Insurance 0 / 0 [place]    1 [id number] 01/22/2024 01/22/2024 Zolpidem Tartrate (Tablet, Extended Release) 30.0 30 6.25 MG NA OLIVER CHANEL [facility] Commercial Insurance 0 / 0 [place]    2 [id number] 11/27/2023 11/27/2023 Zolpidem Tartrate (Tablet, Extended Release) 30.0 30 6.25 MG SUGEY CHANEL [facility] Commercial Insurance 0 / 0 [place]    2 [id number] 09/26/2023 09/26/2023 Zolpidem Tartrate (Tablet, Extended Release) 30.0 30 6.25 MG SUGEY CHANEL [facility] Commercial Insurance 0 / 0 [place]    2 [id number] 09/12/2023 09/05/2023 Acetaminophen 325 Mg / Caffeine 40 Mg Oral Tablet/ Butalbital 50 Mg (Tablet) 30.0 5 40.0 MG/50.0 MG/325.0 MG NA OLIVER BROADT [facility] Commercial Insurance 0 / 0 [place]    2 [id number] 08/02/2023 07/31/2023 Zolpidem Tartrate, Extended Release (Tablet, Extended Release) 30.0 30 ER 6.25 MG NA OLIVER [facility]ARÍO [facility] Commercial Insurance 0 / 0 [place]    2 [id number] 06/27/2023 06/23/2023 Acetaminophen 325 Mg / Caffeine 40 Mg Oral Tablet/ Butalbital 50 Mg (Tablet) 30.0 5 40.0 MG/50.0 MG/325.0 MG NA OLIVER DARÍO [facility] Commercial Insurance 0 / 0 [place]    2 [id number] 05/27/2023 05/26/2023 Zolpidem Tartrate, Extended Release (Tablet, Extended Release) 30.0 30 ER 6.25 MG NA OLIVER CHANEL [facility] Commercial Insurance 0 / 0 [place]

## 2024-05-13 DIAGNOSIS — G47.00 INSOMNIA, UNSPECIFIED TYPE: ICD-10-CM

## 2024-05-14 RX ORDER — ZOLPIDEM TARTRATE 6.25 MG/1
6.25 TABLET, FILM COATED, EXTENDED RELEASE ORAL
Qty: 30 TABLET | Refills: 0 | Status: SHIPPED | OUTPATIENT
Start: 2024-05-14

## 2024-05-14 NOTE — TELEPHONE ENCOUNTER
1 [id number] 05/08/2024 05/03/2024 Acetaminophen 325 Mg / Caffeine 40 Mg Oral Tablet/ Butalbital 50 Mg (Tablet) 30.0 5 40.0 MG/50.0 MG/325.0 MG NA OLIVER DARÍO [facility] Commercial Insurance 0 / 0 [place]    2 [id number] 03/18/2024 03/18/2024 Zolpidem Tartrate (Tablet, Extended Release) 30.0 30 6.25 MG NA LOVELY GRAHAM [facility] Commercial Insurance 0 / 0 [place]    1 [id number] 01/24/2024 01/22/2024 Acetaminophen 325 Mg / Caffeine 40 Mg Oral Tablet/ Butalbital 50 Mg (Tablet) 30.0 5 40.0 MG/50.0 MG/325.0 MG NA OLIVERJENNIFER CHANEL [facility] Commercial Insurance 0 / 0 [place]    2 [id number] 01/22/2024 01/22/2024 Zolpidem Tartrate (Tablet, Extended Release) 30.0 30 6.25 MG NA OLIVER CHANEL [facility] Commercial Insurance 0 / 0 [place]    1 [id number] 11/27/2023 11/27/2023 Zolpidem Tartrate (Tablet, Extended Release) 30.0 30 6.25 MG NA OLIVER CHANEL [facility] Commercial Insurance 0 / 0 [place]    1 [id number] 09/26/2023 09/26/2023 Zolpidem Tartrate (Tablet, Extended Release) 30.0 30 6.25 MG NA OLIVER CHANEL [facility] Commercial Insurance 0 / 0 [place]    1 [id number] 09/12/2023 09/05/2023 Acetaminophen 325 Mg / Caffeine 40 Mg Oral Tablet/ Butalbital 50 Mg (Tablet) 30.0 5 40.0 MG/50.0 MG/325.0 MG NA OLIVER DARÍO [facility] Commercial Insurance 0 / 0 [place]    1 [id number] 08/02/2023 07/31/2023 Zolpidem Tartrate, Extended Release (Tablet, Extended Release) 30.0 30 ER 6.25 MG NA OLIVER DARÍO [facility] Commercial Insurance 0 / 0 [place]    1 [id number] 06/27/2023 06/23/2023 Acetaminophen 325 Mg / Caffeine 40 Mg Oral Tablet/ Butalbital 50 Mg (Tablet) 30.0 5 40.0 MG/50.0 MG/325.0 MG NA OLIVER [employer] [facility] Commercial Insurance 0 / 0 [place]    1 [id number] 05/27/2023 05/26/2023 Zolpidem Tartrate, Extended Release (Tablet, Extended Release) 30.0 30 ER 6.25 MG NA OLIVER  [facility] Commercial Insurance 0 / 0 [place]

## 2024-05-22 ENCOUNTER — OFFICE VISIT (OUTPATIENT)
Dept: OBGYN CLINIC | Facility: HOSPITAL | Age: 54
End: 2024-05-22
Payer: COMMERCIAL

## 2024-05-22 ENCOUNTER — HOSPITAL ENCOUNTER (OUTPATIENT)
Dept: RADIOLOGY | Facility: HOSPITAL | Age: 54
Discharge: HOME | End: 2024-05-22
Attending: ORTHOPAEDIC SURGERY
Payer: COMMERCIAL

## 2024-05-22 VITALS
SYSTOLIC BLOOD PRESSURE: 121 MMHG | WEIGHT: 143.4 LBS | BODY MASS INDEX: 23.89 KG/M2 | HEART RATE: 69 BPM | DIASTOLIC BLOOD PRESSURE: 74 MMHG | HEIGHT: 65 IN

## 2024-05-22 DIAGNOSIS — M18.12 PRIMARY OSTEOARTHRITIS OF FIRST CARPOMETACARPAL JOINT OF LEFT HAND: ICD-10-CM

## 2024-05-22 DIAGNOSIS — R52 PAIN: Primary | ICD-10-CM

## 2024-05-22 DIAGNOSIS — R52 PAIN: ICD-10-CM

## 2024-05-22 DIAGNOSIS — M67.40 GANGLION CYST: ICD-10-CM

## 2024-05-22 PROCEDURE — 99204 OFFICE O/P NEW MOD 45 MIN: CPT | Performed by: ORTHOPAEDIC SURGERY

## 2024-05-22 PROCEDURE — 73110 X-RAY EXAM OF WRIST: CPT

## 2024-05-22 NOTE — PROGRESS NOTES
ASSESSMENT/PLAN:    Assessment:   Dorsal ganglion cyst  left and Thumb CMC Arthritis  left    Plan:   NSAIDs, hand therapy, activity modification, comfort cool bracing, and heat    Follow Up:  3  month(s)    To Do Next Visit:   reevaluate    General Discussions:     CMC Arthritis: The anatomy and physiology of carpometacarpal joint arthritis was discussed with the patient today in the office.  Deterioration of the articular cartilage eventually leads to hypermobility at the thumb CMC joint, resulting in joint subluxation, osteophyte formation, cystic changes within the trapezium and base of the first metacarpal, as well as subchondral sclerosis.  Eventually, pain, limited mobility, and compensatory hyperextension at the metacarpophalangeal joint may develop.  While normal activity and usage of the thumb joint may provide a painful experience to the patient, this typically does not result in damage to the thumb or hand.  Treatment options include resting thumb spica splints to decreased joint edema, pain, and inflammation.  Therapy exercises to strengthen the thenar musculature may relieve pain, but do not alter the overall continued development of osteoarthritis.  Oral medications, topical medications, corticosteroid injections may decrease pain and increase overall function.  Eventually, approximately 5% of patients may require surgical intervention.     Ganglion Cysts: The anatomy and physiology of the ganglion was discussed with the patient today in the office.  Fluid leaking out of the joint surface typically creates a small sac, which can enlarge and cause pain or limitation of motion.  Treatment options include observation, aspiration, or surgical incision were discussed with the patient today.  Observation typically lead to resolution and approximately 10% of patients, aspiration least resolution approximately 50% of patients, and surgical excision lead to resolution in approximately 97% of patients.  After  discussion with the patient today, the patient voiced understanding of all treatment options.    She would like to try conservative treatment with the above. She will consider ganglion cyst excision next visit if it does not subside.   _____________________________________________________  CHIEF COMPLAINT:  Chief Complaint   Patient presents with    Left Wrist - Cyst         SUBJECTIVE:  Rosa Elena Piper is a 53 y.o. female who presents with Pain  Moderate  Intermittant  Dull to the left thumb.  This started  1.5 year(s) ago as Insidious.  She has trouble opening jars and grasping objects as well as typing. The pain is worse over the past 3 months. She also noticed a dorsal wrist mass 2 months ago which bothers her when its palpated.   Radiation: None  Previous Treatments: None   Associated symptoms: Stiffness/LROM  Handedness: right  Work status: principal     PAST MEDICAL HISTORY:  Past Medical History:   Diagnosis Date    Anxiety     Cervicalgia     disc displacement,radiculopathy    CTS (carpal tunnel syndrome)     Depression     Disease of thyroid gland     Headache(784.0)     Headache, tension-type     HL (hearing loss)     Hypertension     last assessed: 8/2/2016    Kidney stone 3/2021    Migraine     Myofascial pain syndrome     Obesity     Obesity (BMI 30.0-34.9)        PAST SURGICAL HISTORY:  Past Surgical History:   Procedure Laterality Date    BREAST BIOPSY Right     biospy w/ marker    CARPAL TUNNEL RELEASE Right     GANGLION CYST EXCISION Left 08/03/2017    Procedure: WRIST/HAND MASS EXCISION;  Surgeon: Eron Dai MD;  Location: [facility] OR;  Service: Orthopedics    GASTRECTOMY SLEEVE LAPAROSCOPIC      HYSTERECTOMY         FAMILY HISTORY:  Family History   Problem Relation Age of Onset    Hypertension Mother     Other Mother         Bladder surgery    Obesity Mother     Cancer Mother 58        abdominal cancer    Colon cancer Mother     Diabetes Father     Hypertension Father     Heart disease  Father     Obesity Father     Leukemia Sister     Obesity Sister     No Known Problems Maternal Aunt     No Known Problems Maternal Aunt     Cancer Maternal Grandmother 70        Bladder    Dementia Maternal Grandfather     Leukemia Paternal Grandmother     No Known Problems Paternal Grandfather     No Known Problems Son     No Known Problems Son     Stroke Neg Hx        SOCIAL HISTORY:  Social History     Tobacco Use    Smoking status: Never     Passive exposure: Never    Smokeless tobacco: Never   Vaping Use    Vaping status: Never Used   Substance Use Topics    Alcohol use: No     Comment: per [employer]-drinks socially    Drug use: No       MEDICATIONS:    Current Outpatient Medications:     B COMPLEX VITAMINS PO, Take 1 tablet by mouth daily, Disp: , Rfl:     butalbital-acetaminophen-caffeine (FIORICET,ESGIC) -40 mg per tablet, Take 1 tablet by mouth every 4 (four) hours as needed for headaches, Disp: 30 tablet, Rfl: 0    Calcium Carb-Cholecalciferol (Calcium 500 + D) 500-3.125 MG-MCG TABS, Take 1 tablet by mouth 2 (two) times a day With Magnesium, Disp: , Rfl:     Dihydroergotamine Mesylate HFA (Trudhesa) 0.725 MG/ACT AERS, 0.725 mg into each nostril if needed (migraine) Use at onset of migraine.  May repeat in 1 hour if needed.  Limit of 3 a week or 8 a month.  Do not use within 24 hours of a triptan., Disp: 8 mL, Rfl: 11    escitalopram (LEXAPRO) 20 mg tablet, TAKE 1 TABLET DAILY, Disp: 90 tablet, Rfl: 3    Galcanezumab-gnlm (Emgality) 120 MG/ML SOAJ, Inject 1 mL under the skin every 28 days, Disp: 3 mL, Rfl: 3    levothyroxine 25 mcg tablet, TAKE 1 TABLET DAILY, Disp: 90 tablet, Rfl: 3    lisinopril (ZESTRIL) 10 mg tablet, TAKE 1 TABLET DAILY, Disp: 90 tablet, Rfl: 3    Multiple Vitamins-Iron (MULTIVITAMIN/IRON PO), Take 1 tablet by mouth 2 (two) times a day, Disp: , Rfl:     zolpidem (AMBIEN CR) 6.25 MG CR tablet, Take 1 tablet (6.25 mg total) by mouth daily at bedtime as needed for sleep, Disp: 30  "tablet, Rfl: 0    zonisamide (Zonegran) 100 mg capsule, Take 1 capsule (100 mg total) by mouth daily at bedtime, Disp: 90 capsule, Rfl: 3    Ubrogepant (UBRELVY) 100 MG tablet, Take 1 tablet (100 mg) one time as needed for migraine. May repeat one additional tablet (100 mg) at least two hours after the first dose. Do not use more than two doses per day (200mg), Disp: 16 tablet, Rfl: 3    Current Facility-Administered Medications:     Botulinum Toxin Type A SOLR 200 Units, 200 Units, Injection, Q3 Months, CAMI Fatima, 200 Units at 12/28/23 1326    ALLERGIES:  Allergies   Allergen Reactions    Levofloxacin Anaphylaxis and Swelling     Patient reports face swelled, throat was starting to close.       REVIEW OF SYSTEMS:  Pertinent items are noted in HPI.  A comprehensive review of systems was negative.    LABS:  HgA1c:   Lab Results   Component Value Date    HGBA1C 5.3 03/26/2021     BMP:   Lab Results   Component Value Date    GLUCOSE 91 12/20/2014    CALCIUM 9.1 12/28/2022     12/20/2014    K 3.7 12/28/2022    CO2 27 12/28/2022     (H) 12/28/2022    BUN 13 12/28/2022    CREATININE 0.85 12/28/2022         _____________________________________________________  PHYSICAL EXAMINATION:  Vital signs: Ht 5' 5\" (1.651 m)   Wt 65 kg (143 lb 6.4 oz)   LMP 11/18/2015   BMI 23.86 kg/m²   General: well developed and well nourished, alert, oriented times 3, and appears comfortable  Psychiatric: Normal  HEENT: Trachea Midline, No torticollis  Cardiovascular: No discernable arrhythmia  Pulmonary: No wheezing or stridor  Abdomen: No rebound or guarding  Extremities: No peripheral edema  Skin: Ganglion dorsal wrist 3 x 3 mm over base of index and long finger bases, not attached to extensor tendons, tender over the cyst  Neurovascular: Sensation Intact to the Median, Ulnar, Radial Nerve, Motor Intact to the Median, Ulnar, Radial Nerve, and Pulses Intact    MUSCULOSKELETAL EXAMINATION:  LEFT SIDE:  CMC: No Tenderness " to STT, No Instability, Positive grind, Positive tendnerness CMC, and Positive Shoulder Sign    _____________________________________________________  STUDIES REVIEWED:  Xrays of the right wrist including thumb cmc view demonstrate significant joint space narrowing at trapezial metacarpal joint with large osteophytes, radial subluxation of thumb cmc joint, no scaphotrapeziotrapezoid arthritis.       PROCEDURES PERFORMED:  Procedures  No Procedures performed today    Scribe Attestation      I,:  Beau Lofton MD am acting as a scribe while in the presence of the attending physician.:       I,:  Eron Dai MD personally performed the services described in this documentation    as scribed in my presence.:

## 2024-05-29 ENCOUNTER — EVALUATION (OUTPATIENT)
Dept: OCCUPATIONAL THERAPY | Facility: CLINIC | Age: 54
End: 2024-05-29
Payer: COMMERCIAL

## 2024-05-29 DIAGNOSIS — M18.12 PRIMARY OSTEOARTHRITIS OF FIRST CARPOMETACARPAL JOINT OF LEFT HAND: ICD-10-CM

## 2024-05-29 PROCEDURE — 97110 THERAPEUTIC EXERCISES: CPT | Performed by: OCCUPATIONAL THERAPIST

## 2024-05-29 PROCEDURE — 97165 OT EVAL LOW COMPLEX 30 MIN: CPT | Performed by: OCCUPATIONAL THERAPIST

## 2024-05-29 NOTE — PROGRESS NOTES
OT Evaluation     Today's date: 2024  Patient name: Rosa Elena Piper  : 1970  MRN: [id number]  Referring provider: Eron Dai MD  Dx:   Encounter Diagnosis     ICD-10-CM    1. Primary osteoarthritis of first carpometacarpal joint of left hand  M18.12 Ambulatory Referral to PT/OT Hand Therapy     OT Plan of Care Cert/Re-cert          Start Time: 1700  Stop Time: 1730  Total time in clinic (min): 30 minutes    Assessment  Impairments: activity intolerance, impaired physical strength, lacks appropriate home exercise program and pain with function    Assessment details: Rosa Elena presents with findings consistent to the referred diagnosis of left 1st CMC OA including tenderness at the joint, pain with grasping/pinching, reduced function, and positive provacative testing. She will benefit from skilled occupational therapy at a frequency of one time per week to address these deficits and to improve functional use of the left hand. See below for a detailed assessment.         Goals  STG: Patient will be compliant with home exercise program in 1 week.  STG: Pain will not exceed a 2/10 during appropriate activity and during therapy in 4 weeks.  STG: Patient will report improved activity tolerance towards using the computer in 4 weeks.     LTG: Performance in ADLs and IADLS will be improved to prior level of function with the affected extremity within 6 weeks or discharge.   LTG: FOTO score increase by 10 points within 6 weeks or discharge.                 Plan  Patient would benefit from: skilled OT and OT eval  Planned modality interventions: thermotherapy: hydrocollator packs    Planned therapy interventions: home exercise program, manual therapy, therapeutic exercise, patient education, therapeutic activities, neuromuscular re-education, IASTM, kinesiology taping, joint mobilization, strengthening, stretching and functional ROM exercises    Frequency: 1x week  Plan of Care beginning date:  2024  Plan of Care expiration date: 2024  Treatment plan discussed with: patient  Plan details: Treatment to include modalities, manual therapy, PRE's, HEP, and orthotics as appropriate.       Subjective Evaluation    History of Present Illness  Mechanism of injury: Rosa Elena reports that she began to experience left thumb pain about 6 months ago with a progressively worsening onset. She attributes the onset of pain to a lot of typing. She has been using a comfort cool brace during the day to manage symptoms.   Patient Goals  Patient goals for therapy: increased strength, decreased pain, increased motion and independence with ADLs/IADLs    Pain  Current pain rating: 3  At worst pain ratin  Location: 1st CMC, and radiatng to the wrist  Quality: sharp    Social Support    Employment status: working (Principal)  Hand dominance: right      Diagnostic Tests  Abnormal x-ray: Mild osteoarthritis at the first carpometacarpal joint.  Treatments  Current treatment: occupational therapy        Objective     Tenderness     Left Wrist/Hand   Tenderness in the CMC joint. No tenderness in the radial styloid process.     Neurological Testing     Additional Neurological Details  No reports of numbness/tingling.     Active Range of Motion     Left Wrist   Normal active range of motion    Left Thumb   Flexion     MP: 62 degrees    DIP: 62 degrees  Palmar Abduction     CMC: 60 degrees  Radial abduction    CMC: 70 degrees  Kapandji score: 10 degrees      Right Thumb   Flexion     MP: 62    DIP: 80  Palmar Abduction    CMC: 72  Radial Abduction    CMC: 75  Kapandji score: 10 degrees    Strength/Myotome Testing     Left Wrist/Hand      (2nd hand position)     Trial 1: 48.9    Thumb Strength  Key/Lateral Pinch     Trial 1: 10.6  Palmar/Three-Point Pinch     Trial 1: 9.6    Right Wrist/Hand      (2nd hand position)     Trial 1: 48.7    Thumb Strength   Key/Lateral Pinch     Trial 1: 10.8  Palmar/Three-Point Pinch      Trial 1: 9.8    Tests     Left Wrist/Hand   Positive CMC grind and CMC lever test positive.   Negative CMC shoulder sign and Finkelstein's.     Additional Tests Details  -hyperextension  -adduction contracture      ?       Precautions: Universal    Manuals 5/29            STM thenars and adductor             KT PRN                                       Neuro Re-Ed                                                                                                        Ther Ex             HEP: Joint distraction, adductor release, tennis ball C, 1st dorsal interossei strength            Keypegs             [place] roll             Coordination balls             1st dorsal interossei strength             Pinch with ergonomics                                        Ther Activity                                       Gait Training                                       Modalities             [facility] 5'

## 2024-06-03 ENCOUNTER — TELEPHONE (OUTPATIENT)
Dept: FAMILY MEDICINE CLINIC | Facility: CLINIC | Age: 54
End: 2024-06-03

## 2024-06-03 DIAGNOSIS — I10 ESSENTIAL HYPERTENSION: ICD-10-CM

## 2024-06-03 DIAGNOSIS — F41.9 ANXIETY: ICD-10-CM

## 2024-06-03 RX ORDER — ESCITALOPRAM OXALATE 20 MG/1
TABLET ORAL
Qty: 90 TABLET | Refills: 1 | Status: SHIPPED | OUTPATIENT
Start: 2024-06-03

## 2024-06-03 RX ORDER — LISINOPRIL 10 MG/1
TABLET ORAL
Qty: 90 TABLET | Refills: 1 | Status: SHIPPED | OUTPATIENT
Start: 2024-06-03

## 2024-06-03 NOTE — TELEPHONE ENCOUNTER
Pt's  dropped off Pre Op Form today 6/3. With the return by date of 6/4. PCP is currently out of vacation.  Asked covering providers about this form and they stated that they would be unable to sign this form because they were not the initial provider who completed the physical. Provider's have no openings today for the form to be completed by tomorrow.  Advised that this form can be completed by a [facility] provider.

## 2024-06-11 ENCOUNTER — TELEPHONE (OUTPATIENT)
Dept: NEUROLOGY | Facility: CLINIC | Age: 54
End: 2024-06-11

## 2024-06-11 NOTE — TELEPHONE ENCOUNTER
June 8, 2024  Rosa Elena Piper   [facility] Clinical Team 5 (supporting Rosmery Geronimo PA-C)         6/8/24  7:28 PM  Rosmery,   I wanted to inform you that I was not happy with the Trudhesa when I used it. It completely stuffed me up, I could only breathe out of my mouth. I ended up with a worse headache. I am disappointed that this didn’t work better for me.      I received 8 doses and only used one, not sure what can be done with the rest because I will not take them.      Insurance would not fill the other script, so I continue to use my original script.     Rosa Elena Piper

## 2024-06-11 NOTE — TELEPHONE ENCOUNTER
Pt was also ordered Ubrelvy at 4/26 appt.      Ubrelvy PA located on CMM  Ubrelvy Pa complete on CMM  Key: [id number]    Received immediate approval on CMM  Approvedtoday  Meets Pharmacy Policy    [place] message sent to pt

## 2024-07-02 DIAGNOSIS — G43.809 OTHER MIGRAINE WITHOUT STATUS MIGRAINOSUS, NOT INTRACTABLE: ICD-10-CM

## 2024-07-03 ENCOUNTER — OFFICE VISIT (OUTPATIENT)
Dept: FAMILY MEDICINE CLINIC | Facility: CLINIC | Age: 54
End: 2024-07-03
Payer: COMMERCIAL

## 2024-07-03 VITALS
WEIGHT: 145.2 LBS | TEMPERATURE: 96.5 F | HEIGHT: 65 IN | HEART RATE: 70 BPM | OXYGEN SATURATION: 99 % | DIASTOLIC BLOOD PRESSURE: 82 MMHG | BODY MASS INDEX: 24.19 KG/M2 | SYSTOLIC BLOOD PRESSURE: 120 MMHG

## 2024-07-03 DIAGNOSIS — G47.00 INSOMNIA, UNSPECIFIED TYPE: ICD-10-CM

## 2024-07-03 DIAGNOSIS — J40 BRONCHITIS: Primary | ICD-10-CM

## 2024-07-03 PROCEDURE — 99213 OFFICE O/P EST LOW 20 MIN: CPT | Performed by: FAMILY MEDICINE

## 2024-07-03 RX ORDER — BENZONATATE 100 MG/1
100 CAPSULE ORAL 3 TIMES DAILY PRN
Qty: 20 CAPSULE | Refills: 0 | Status: SHIPPED | OUTPATIENT
Start: 2024-07-03

## 2024-07-03 RX ORDER — BUTALBITAL, ACETAMINOPHEN AND CAFFEINE 50; 325; 40 MG/1; MG/1; MG/1
1 TABLET ORAL EVERY 4 HOURS PRN
Qty: 30 TABLET | Refills: 0 | Status: SHIPPED | OUTPATIENT
Start: 2024-07-03

## 2024-07-03 RX ORDER — AZITHROMYCIN 250 MG/1
TABLET, FILM COATED ORAL
Qty: 6 TABLET | Refills: 0 | Status: SHIPPED | OUTPATIENT
Start: 2024-07-03 | End: 2024-07-08

## 2024-07-03 RX ORDER — ZOLPIDEM TARTRATE 6.25 MG/1
6.25 TABLET, FILM COATED, EXTENDED RELEASE ORAL
Qty: 30 TABLET | Refills: 0 | Status: SHIPPED | OUTPATIENT
Start: 2024-07-03

## 2024-07-03 NOTE — TELEPHONE ENCOUNTER
1 [id number] 05/14/2024 05/14/2024 Zolpidem Tartrate (Tablet, Extended Release) 30.0 30 6.25 MG NA OLIVER CHANEL [facility] Commercial Insurance 0 / 0 PA    2 [id number] 05/08/2024 05/03/2024 Acetaminophen 325 Mg / Caffeine 40 Mg Oral Tablet/ Butalbital 50 Mg (Tablet) 30.0 5 325 MG-50 MG-40 MG NA OLIVER CHANEL [facility] Commercial Insurance 0 / 0 PA    1 [id number] 03/18/2024 03/18/2024 Zolpidem Tartrate (Tablet, Extended Release) 30.0 30 6.25 MG NA LOVELY GRAHAM [facility] Commercial Insurance 0 / 0 PA    2 [id number] 01/24/2024 01/22/2024 Acetaminophen 325 Mg / Caffeine 40 Mg Oral Tablet/ Butalbital 50 Mg (Tablet) 30.0 5 325 MG-50 MG-40 MG NA OLIVER CHANEL [facility] Commercial Insurance 0 / 0 PA    1 [id number] 01/22/2024 01/22/2024 Zolpidem Tartrate (Tablet, Extended Release) 30.0 30 6.25 MG NA OLIVER CHANEL [facility] Commercial Insurance 0 / 0 PA    2 [id number] 11/27/2023 11/27/2023 Zolpidem Tartrate (Tablet, Extended Release) 30.0 30 6.25 MG NA OLIVER CHANEL [facility] Commercial Insurance 0 / 0 PA    2 [id number] 09/26/2023 09/26/2023 Zolpidem Tartrate (Tablet, Extended Release) 30.0 30 6.25 MG NA OLIVER CHANEL [facility] Commercial Insurance 0 / 0 PA    2 [id number] 09/12/2023 09/05/2023 Acetaminophen 325 Mg / Caffeine 40 Mg Oral Tablet/ Butalbital 50 Mg (Tablet) 30.0 5 325 MG-50 MG-40 MG NA OLIVERJENNIFER CHANEL [facility] Commercial Insurance 0 / 0 PA    2 [id number] 08/02/2023 07/31/2023 Zolpidem Tartrate, Extended Release (Tablet, Extended Release) 30.0 30 ER 6.25 MG NA OLIVER CHANEL [facility] [facility] Commercial Insurance 0 / 0 PA

## 2024-07-03 NOTE — PROGRESS NOTES
"Ambulatory Visit  Name: Rosa Elena Piper      : 1970      MRN: [id number]  Encounter Provider: Chalino Calvillo MD  Encounter Date: 7/3/2024   Encounter department: [facility]    Assessment & Plan   1. Bronchitis  -     azithromycin (Zithromax) 250 mg tablet; Take 2 tablets (500 mg total) by mouth daily for 1 day, THEN 1 tablet (250 mg total) daily for 4 days.  -     benzonatate (TESSALON PERLES) 100 mg capsule; Take 1 capsule (100 mg total) by mouth 3 (three) times a day as needed for cough  2. Insomnia, unspecified type  -     zolpidem (AMBIEN CR) 6.25 MG CR tablet; Take 1 tablet (6.25 mg total) by mouth daily at bedtime as needed for sleep  Counseled the patient regarding supportive care.  They are to call or return to the office if not improving.       History of Present Illness     Cough     Patient started with sore throat 2 weeks ago, progressed to losing her voice, then now more cough dominant illness. Some nasal congestion. Sore throat has resolved. Denies fever, chills, rash, myalgias.     Review of Systems   Respiratory:  Positive for cough.      Medical History Reviewed by provider this encounter:       Objective     /82 (BP Location: Left arm, Patient Position: Sitting, Cuff Size: Adult)   Pulse 70   Temp (!) 96.5 °F (35.8 °C)   Ht 5' 5\" (1.651 m)   Wt 65.9 kg (145 lb 3.2 oz)   LMP 2015   SpO2 99%   BMI 24.16 kg/m²     Physical Exam  Vitals and nursing note reviewed.   Constitutional:       General: She is not in acute distress.     Appearance: She is well-developed.   HENT:      Head: Normocephalic and atraumatic.      Right Ear: Tympanic membrane, ear canal and external ear normal.      Left Ear: Tympanic membrane, ear canal and external ear normal.      Nose: Nose normal.      Mouth/Throat:      Mouth: Mucous membranes are moist.      Pharynx: No oropharyngeal exudate or posterior oropharyngeal erythema.   Eyes:      Conjunctiva/sclera: " Conjunctivae normal.   Neck:      Trachea: No tracheal deviation.   Cardiovascular:      Rate and Rhythm: Normal rate and regular rhythm.      Pulses: Normal pulses.      Heart sounds: Normal heart sounds. No murmur heard.  Pulmonary:      Effort: Pulmonary effort is normal.      Breath sounds: Normal breath sounds. No wheezing, rhonchi or rales.   Abdominal:      Tenderness: There is no abdominal tenderness.   Lymphadenopathy:      Cervical: No cervical adenopathy.   Skin:     General: Skin is warm and dry.      Capillary Refill: Capillary refill takes less than 2 seconds.      Findings: No rash.   Neurological:      Mental Status: She is alert.      Cranial Nerves: No cranial nerve deficit.       Administrative Statements

## 2024-07-15 ENCOUNTER — TELEPHONE (OUTPATIENT)
Age: 54
End: 2024-07-15

## 2024-07-15 NOTE — TELEPHONE ENCOUNTER
Spoke to patient started intermittently over the last 2 weeks. Denies discomfort, burning, frequency, fever. Nurse visit urine test or do you want to see her?

## 2024-07-15 NOTE — TELEPHONE ENCOUNTER
Patient wanted to be seen for dark urine this morning.  I could find no availability. She would like to have provider order something for her.  Thank you.

## 2024-07-16 ENCOUNTER — CLINICAL SUPPORT (OUTPATIENT)
Dept: FAMILY MEDICINE CLINIC | Facility: CLINIC | Age: 54
End: 2024-07-16
Payer: COMMERCIAL

## 2024-07-16 DIAGNOSIS — N39.0 URINARY TRACT INFECTION WITH HEMATURIA, SITE UNSPECIFIED: Primary | ICD-10-CM

## 2024-07-16 DIAGNOSIS — R39.9 URINARY SYMPTOM OR SIGN: Primary | ICD-10-CM

## 2024-07-16 DIAGNOSIS — R31.9 URINARY TRACT INFECTION WITH HEMATURIA, SITE UNSPECIFIED: Primary | ICD-10-CM

## 2024-07-16 LAB
SL AMB  POCT GLUCOSE, UA: ABNORMAL
SL AMB LEUKOCYTE ESTERASE,UA: ABNORMAL
SL AMB POCT BILIRUBIN,UA: ABNORMAL
SL AMB POCT BLOOD,UA: ABNORMAL
SL AMB POCT CLARITY,UA: ABNORMAL
SL AMB POCT COLOR,UA: ABNORMAL
SL AMB POCT KETONES,UA: ABNORMAL
SL AMB POCT NITRITE,UA: ABNORMAL
SL AMB POCT PH,UA: 6
SL AMB POCT SPECIFIC GRAVITY,UA: 1.01
SL AMB POCT URINE PROTEIN: ABNORMAL
SL AMB POCT UROBILINOGEN: ABNORMAL

## 2024-07-16 PROCEDURE — 81003 URINALYSIS AUTO W/O SCOPE: CPT

## 2024-07-16 PROCEDURE — 87086 URINE CULTURE/COLONY COUNT: CPT | Performed by: FAMILY MEDICINE

## 2024-07-16 RX ORDER — NITROFURANTOIN 25; 75 MG/1; MG/1
100 CAPSULE ORAL 2 TIMES DAILY
Qty: 20 CAPSULE | Refills: 0 | Status: SHIPPED | OUTPATIENT
Start: 2024-07-16 | End: 2024-07-26

## 2024-07-17 LAB — BACTERIA UR CULT: NORMAL

## 2024-07-18 ENCOUNTER — TELEPHONE (OUTPATIENT)
Age: 54
End: 2024-07-18

## 2024-07-18 DIAGNOSIS — J01.90 ACUTE SINUSITIS, RECURRENCE NOT SPECIFIED, UNSPECIFIED LOCATION: Primary | ICD-10-CM

## 2024-07-18 RX ORDER — PREDNISONE 10 MG/1
TABLET ORAL
Qty: 26 TABLET | Refills: 0 | Status: SHIPPED | OUTPATIENT
Start: 2024-07-18

## 2024-07-18 RX ORDER — DOXYCYCLINE HYCLATE 100 MG/1
100 CAPSULE ORAL EVERY 12 HOURS SCHEDULED
Qty: 20 CAPSULE | Refills: 0 | Status: SHIPPED | OUTPATIENT
Start: 2024-07-18 | End: 2024-07-28

## 2024-07-18 NOTE — TELEPHONE ENCOUNTER
Pt called in stating she was told to call in if her sinus infection didn't go away after taking the prescription. She would like a call back to let her know what she should do.

## 2024-07-22 ENCOUNTER — CLINICAL SUPPORT (OUTPATIENT)
Dept: FAMILY MEDICINE CLINIC | Facility: CLINIC | Age: 54
End: 2024-07-22
Payer: COMMERCIAL

## 2024-07-22 DIAGNOSIS — R39.9 URINARY SYMPTOM OR SIGN: Primary | ICD-10-CM

## 2024-07-22 DIAGNOSIS — R31.9 HEMATURIA, UNSPECIFIED TYPE: Primary | ICD-10-CM

## 2024-07-22 LAB
SL AMB  POCT GLUCOSE, UA: ABNORMAL
SL AMB LEUKOCYTE ESTERASE,UA: ABNORMAL
SL AMB POCT BILIRUBIN,UA: ABNORMAL
SL AMB POCT BLOOD,UA: ABNORMAL
SL AMB POCT CLARITY,UA: ABNORMAL
SL AMB POCT COLOR,UA: YELLOW
SL AMB POCT KETONES,UA: ABNORMAL
SL AMB POCT NITRITE,UA: ABNORMAL
SL AMB POCT PH,UA: 6
SL AMB POCT SPECIFIC GRAVITY,UA: 1.01
SL AMB POCT URINE PROTEIN: ABNORMAL
SL AMB POCT UROBILINOGEN: ABNORMAL

## 2024-07-22 PROCEDURE — 81003 URINALYSIS AUTO W/O SCOPE: CPT

## 2024-07-22 PROCEDURE — 87086 URINE CULTURE/COLONY COUNT: CPT | Performed by: FAMILY MEDICINE

## 2024-07-22 NOTE — TELEPHONE ENCOUNTER
Pt in office for nurse visit to retest her urine. She is concerned it could be a kidney stone causing her symptoms. She is aloso concerned for bladder cancer. Please review results of urine dip and advise. Sent culture as well

## 2024-07-22 NOTE — TELEPHONE ENCOUNTER
Pt returned call, message in chart from provider relayed.  Contact info given for central scheduling.

## 2024-07-25 LAB — BACTERIA UR CULT: NORMAL

## 2024-07-26 ENCOUNTER — HOSPITAL ENCOUNTER (OUTPATIENT)
Dept: ULTRASOUND IMAGING | Facility: HOSPITAL | Age: 54
Discharge: HOME | End: 2024-07-26
Attending: FAMILY MEDICINE
Payer: COMMERCIAL

## 2024-07-26 DIAGNOSIS — R31.9 HEMATURIA, UNSPECIFIED TYPE: ICD-10-CM

## 2024-07-26 PROCEDURE — 76775 US EXAM ABDO BACK WALL LIM: CPT

## 2024-07-29 ENCOUNTER — APPOINTMENT (EMERGENCY)
Dept: CT IMAGING | Facility: HOSPITAL | Age: 54
End: 2024-07-29
Payer: COMMERCIAL

## 2024-07-29 ENCOUNTER — HOSPITAL ENCOUNTER (EMERGENCY)
Facility: HOSPITAL | Age: 54
Discharge: HOME | End: 2024-07-29
Attending: EMERGENCY MEDICINE | Admitting: EMERGENCY MEDICINE
Payer: COMMERCIAL

## 2024-07-29 VITALS
DIASTOLIC BLOOD PRESSURE: 71 MMHG | SYSTOLIC BLOOD PRESSURE: 126 MMHG | TEMPERATURE: 98.2 F | OXYGEN SATURATION: 99 % | HEART RATE: 52 BPM | RESPIRATION RATE: 18 BRPM

## 2024-07-29 DIAGNOSIS — D18.00 HEMANGIOMA: ICD-10-CM

## 2024-07-29 DIAGNOSIS — N20.0 RENAL LITHIASIS: Primary | ICD-10-CM

## 2024-07-29 DIAGNOSIS — N23 RENAL COLIC ON LEFT SIDE: Primary | ICD-10-CM

## 2024-07-29 DIAGNOSIS — N39.0 UTI (URINARY TRACT INFECTION): ICD-10-CM

## 2024-07-29 LAB
ALBUMIN SERPL BCG-MCNC: 3.8 G/DL (ref 3.5–5)
ALP SERPL-CCNC: 55 U/L (ref 34–104)
ALT SERPL W P-5'-P-CCNC: 16 U/L (ref 7–52)
ANION GAP SERPL CALCULATED.3IONS-SCNC: 4 MMOL/L (ref 4–13)
AST SERPL W P-5'-P-CCNC: 20 U/L (ref 13–39)
BACTERIA UR QL AUTO: ABNORMAL /HPF
BASOPHILS # BLD AUTO: 0.04 THOUSANDS/ÂΜL (ref 0–0.1)
BASOPHILS NFR BLD AUTO: 1 % (ref 0–1)
BILIRUB DIRECT SERPL-MCNC: 0.05 MG/DL (ref 0–0.2)
BILIRUB SERPL-MCNC: 0.35 MG/DL (ref 0.2–1)
BILIRUB UR QL STRIP: NEGATIVE
BUN SERPL-MCNC: 14 MG/DL (ref 5–25)
CALCIUM SERPL-MCNC: 9.1 MG/DL (ref 8.4–10.2)
CHLORIDE SERPL-SCNC: 109 MMOL/L (ref 96–108)
CLARITY UR: ABNORMAL
CO2 SERPL-SCNC: 29 MMOL/L (ref 21–32)
COLOR UR: ABNORMAL
CREAT SERPL-MCNC: 0.96 MG/DL (ref 0.6–1.3)
EOSINOPHIL # BLD AUTO: 0.24 THOUSAND/ÂΜL (ref 0–0.61)
EOSINOPHIL NFR BLD AUTO: 4 % (ref 0–6)
ERYTHROCYTE [DISTWIDTH] IN BLOOD BY AUTOMATED COUNT: 12.9 % (ref 11.6–15.1)
GFR SERPL CREATININE-BSD FRML MDRD: 68 ML/MIN/1.73SQ M
GLUCOSE SERPL-MCNC: 94 MG/DL (ref 65–140)
GLUCOSE UR STRIP-MCNC: NEGATIVE MG/DL
HCT VFR BLD AUTO: 41.9 % (ref 34.8–46.1)
HGB BLD-MCNC: 14 G/DL (ref 11.5–15.4)
HGB UR QL STRIP.AUTO: ABNORMAL
IMM GRANULOCYTES # BLD AUTO: 0.02 THOUSAND/UL (ref 0–0.2)
IMM GRANULOCYTES NFR BLD AUTO: 0 % (ref 0–2)
KETONES UR STRIP-MCNC: NEGATIVE MG/DL
LEUKOCYTE ESTERASE UR QL STRIP: ABNORMAL
LIPASE SERPL-CCNC: 66 U/L (ref 11–82)
LYMPHOCYTES # BLD AUTO: 2.52 THOUSANDS/ÂΜL (ref 0.6–4.47)
LYMPHOCYTES NFR BLD AUTO: 37 % (ref 14–44)
MCH RBC QN AUTO: 31.3 PG (ref 26.8–34.3)
MCHC RBC AUTO-ENTMCNC: 33.4 G/DL (ref 31.4–37.4)
MCV RBC AUTO: 94 FL (ref 82–98)
MONOCYTES # BLD AUTO: 0.53 THOUSAND/ÂΜL (ref 0.17–1.22)
MONOCYTES NFR BLD AUTO: 8 % (ref 4–12)
NEUTROPHILS # BLD AUTO: 3.38 THOUSANDS/ÂΜL (ref 1.85–7.62)
NEUTS SEG NFR BLD AUTO: 50 % (ref 43–75)
NITRITE UR QL STRIP: NEGATIVE
NON-SQ EPI CELLS URNS QL MICRO: ABNORMAL /HPF
NRBC BLD AUTO-RTO: 0 /100 WBCS
PH UR STRIP.AUTO: 6.5 [PH]
PLATELET # BLD AUTO: 151 THOUSANDS/UL (ref 149–390)
PLATELET BLD QL SMEAR: ADEQUATE
PMV BLD AUTO: 9.4 FL (ref 8.9–12.7)
POTASSIUM SERPL-SCNC: 4.2 MMOL/L (ref 3.5–5.3)
PROT SERPL-MCNC: 5.7 G/DL (ref 6.4–8.4)
PROT UR STRIP-MCNC: ABNORMAL MG/DL
RBC # BLD AUTO: 4.48 MILLION/UL (ref 3.81–5.12)
RBC #/AREA URNS AUTO: ABNORMAL /HPF
RBC MORPH BLD: NORMAL
SODIUM SERPL-SCNC: 142 MMOL/L (ref 135–147)
SP GR UR STRIP.AUTO: 1.01 (ref 1–1.03)
UROBILINOGEN UR STRIP-ACNC: <2 MG/DL
WBC # BLD AUTO: 6.73 THOUSAND/UL (ref 4.31–10.16)
WBC #/AREA URNS AUTO: ABNORMAL /HPF

## 2024-07-29 PROCEDURE — 96375 TX/PRO/DX INJ NEW DRUG ADDON: CPT

## 2024-07-29 PROCEDURE — 99285 EMERGENCY DEPT VISIT HI MDM: CPT | Performed by: EMERGENCY MEDICINE

## 2024-07-29 PROCEDURE — 36415 COLL VENOUS BLD VENIPUNCTURE: CPT | Performed by: EMERGENCY MEDICINE

## 2024-07-29 PROCEDURE — 74176 CT ABD & PELVIS W/O CONTRAST: CPT

## 2024-07-29 PROCEDURE — 85025 COMPLETE CBC W/AUTO DIFF WBC: CPT | Performed by: EMERGENCY MEDICINE

## 2024-07-29 PROCEDURE — 81001 URINALYSIS AUTO W/SCOPE: CPT | Performed by: EMERGENCY MEDICINE

## 2024-07-29 PROCEDURE — 87086 URINE CULTURE/COLONY COUNT: CPT | Performed by: EMERGENCY MEDICINE

## 2024-07-29 PROCEDURE — 80076 HEPATIC FUNCTION PANEL: CPT | Performed by: EMERGENCY MEDICINE

## 2024-07-29 PROCEDURE — 80048 BASIC METABOLIC PNL TOTAL CA: CPT | Performed by: EMERGENCY MEDICINE

## 2024-07-29 PROCEDURE — 83690 ASSAY OF LIPASE: CPT | Performed by: EMERGENCY MEDICINE

## 2024-07-29 PROCEDURE — 99284 EMERGENCY DEPT VISIT MOD MDM: CPT

## 2024-07-29 PROCEDURE — 96365 THER/PROPH/DIAG IV INF INIT: CPT

## 2024-07-29 RX ORDER — HYDROMORPHONE HCL IN WATER/PF 6 MG/30 ML
0.2 PATIENT CONTROLLED ANALGESIA SYRINGE INTRAVENOUS ONCE
Status: COMPLETED | OUTPATIENT
Start: 2024-07-29 | End: 2024-07-29

## 2024-07-29 RX ORDER — TAMSULOSIN HYDROCHLORIDE 0.4 MG/1
0.4 CAPSULE ORAL
Qty: 7 CAPSULE | Refills: 0 | Status: SHIPPED | OUTPATIENT
Start: 2024-07-29 | End: 2024-08-05

## 2024-07-29 RX ORDER — ONDANSETRON 4 MG/1
4 TABLET, ORALLY DISINTEGRATING ORAL EVERY 8 HOURS PRN
Qty: 12 TABLET | Refills: 0 | Status: SHIPPED | OUTPATIENT
Start: 2024-07-29 | End: 2024-08-02

## 2024-07-29 RX ORDER — CEFPODOXIME PROXETIL 200 MG/1
200 TABLET, FILM COATED ORAL 2 TIMES DAILY
Qty: 20 TABLET | Refills: 0 | Status: SHIPPED | OUTPATIENT
Start: 2024-07-29 | End: 2024-08-08

## 2024-07-29 RX ORDER — OXYCODONE HYDROCHLORIDE 5 MG/1
5 TABLET ORAL EVERY 6 HOURS PRN
Qty: 12 TABLET | Refills: 0 | Status: SHIPPED | OUTPATIENT
Start: 2024-07-29 | End: 2024-08-01

## 2024-07-29 RX ADMIN — HYDROMORPHONE HYDROCHLORIDE 0.2 MG: 0.2 INJECTION, SOLUTION INTRAMUSCULAR; INTRAVENOUS; SUBCUTANEOUS at 14:02

## 2024-07-29 RX ADMIN — CEFTRIAXONE SODIUM 1000 MG: 10 INJECTION, POWDER, FOR SOLUTION INTRAVENOUS at 16:13

## 2024-07-29 NOTE — ED PROCEDURE NOTE
Procedure  POC Renal US    Date/Time: 7/29/2024 1:54 PM    Performed by: Steve Knight MD  Authorized by: Steve Knight MD    Patient location:  ED  Performed by:  Resident  Procedure details:     Exam Type:  Educational    Indications: flank/back pain      Assessment for:  Suspected hydronephrosis and bladder volume    Views obtained: bladder (transverse and sagittal), left kidney and right kidney      Image quality: limited diagnostic      Image availability:  Images available in PACS  Findings:     LEFT kidney findings: indeterminate      RIGHT kidney findings: indeterminate      Bladder:  Visualized  Interpretation:     Renal ultrasound impressions: indeterminate    POC Biliary US    Date/Time: 7/29/2024 1:55 PM    Performed by: Steve Knight MD  Authorized by: Steve Knight MD    Patient location:  ED  Performed by:  Resident  Procedure details:     Exam Type:  Educational    Views obtained: gallbladder (transverse and longitudinal), liver, common bile duct and portal triad      Image quality: non-diagnostic      Image availability:  Images available in PACS                   Steve Knight MD  07/29/24 [id number]

## 2024-07-29 NOTE — DISCHARGE INSTRUCTIONS
Please make sure to follow-up with urology and gastroenterology.  Use Tylenol and ibuprofen for pain as needed.  If you have severe pain you can take oxycodone.  Use Flomax daily.  Antibiotics as well twice a day.  Return to ER if you are having any fevers, severe pain, vomiting or feel worse overall.

## 2024-07-30 NOTE — ED PROVIDER NOTES
History  Chief Complaint   Patient presents with    Flank Pain     Pt reports R and L sided flank pain, R side worse than left, confirmed kidney stones on US done Friday. Hematuria, denies vomiting, nausea.        History provided by:  Patient   used: No    Flank Pain  Associated symptoms: hematuria    Associated symptoms: no chest pain, no chills, no cough, no diarrhea, no dysuria, no fever, no nausea, no shortness of breath and no vomiting      53-year-old female presenting to emergency department bilateral flank pain.  Has been present for weeks, getting progressively worse, nausea without vomiting.  Hematuria.  No fevers or chills.  No chest pain.  No short of breath.  Abdominal pain.  Pain does radiate from the flank to the abdomen though.  Social alcohol use.  No drugs.  No smoking.      Prior to Admission Medications   Prescriptions Last Dose Informant Patient Reported? Taking?   B COMPLEX VITAMINS PO  Self Yes No   Sig: Take 1 tablet by mouth daily   Calcium Carb-Cholecalciferol (Calcium 500 + D) 500-3.125 MG-MCG TABS  Self Yes No   Sig: Take 1 tablet by mouth 2 (two) times a day With Magnesium   Dihydroergotamine Mesylate HFA (Trudhesa) 0.725 MG/ACT AERS   No No   Si.725 mg into each nostril if needed (migraine) Use at onset of migraine.  May repeat in 1 hour if needed.  Limit of 3 a week or 8 a month.  Do not use within 24 hours of a triptan.   Galcanezumab-gnlm (Emgality) 120 MG/ML SOAJ   No No   Sig: Inject 1 mL under the skin every 28 days   Multiple Vitamins-Iron (MULTIVITAMIN/IRON PO)  Self Yes No   Sig: Take 1 tablet by mouth 2 (two) times a day   Ubrogepant (UBRELVY) 100 MG tablet   No No   Sig: Take 1 tablet (100 mg) one time as needed for migraine. May repeat one additional tablet (100 mg) at least two hours after the first dose. Do not use more than two doses per day (200mg)   benzonatate (TESSALON PERLES) 100 mg capsule   No No   Sig: Take 1 capsule (100 mg total) by  mouth 3 (three) times a day as needed for cough   butalbital-acetaminophen-caffeine (FIORICET,ESGIC) -40 mg per tablet   No No   Sig: Take 1 tablet by mouth every 4 (four) hours as needed for headaches   doxycycline hyclate (VIBRAMYCIN) 100 mg capsule   No No   Sig: Take 1 capsule (100 mg total) by mouth every 12 (twelve) hours for 10 days   escitalopram (LEXAPRO) 20 mg tablet   No No   Sig: TAKE 1 TABLET DAILY   levothyroxine 25 mcg tablet  Self No No   Sig: TAKE 1 TABLET DAILY   lisinopril (ZESTRIL) 10 mg tablet   No No   Sig: TAKE 1 TABLET DAILY   predniSONE 10 mg tablet   No No   Sig: 3 tabs po bid x2 days, then 2 tabs po bid x2 days, then 1 tab bid x2 days, then 1 daily until done.   zolpidem (AMBIEN CR) 6.25 MG CR tablet   No No   Sig: Take 1 tablet (6.25 mg total) by mouth daily at bedtime as needed for sleep   zonisamide (Zonegran) 100 mg capsule   No No   Sig: Take 1 capsule (100 mg total) by mouth daily at bedtime      Facility-Administered Medications Last Administration Doses Remaining   Botulinum Toxin Type A SOLR 200 Units 12/28/2023  1:26 PM           Past Medical History:   Diagnosis Date    Anxiety     Cervicalgia     disc displacement,radiculopathy    CTS (carpal tunnel syndrome)     Depression     Disease of thyroid gland     Headache(784.0)     Headache, tension-type     HL (hearing loss)     Hypertension     last assessed: 8/2/2016    Kidney stone 3/2021    Migraine     Myofascial pain syndrome     Obesity     Obesity (BMI 30.0-34.9)        Past Surgical History:   Procedure Laterality Date    BREAST BIOPSY Right     biospy w/ marker    CARPAL TUNNEL RELEASE Right     GANGLION CYST EXCISION Left 08/03/2017    Procedure: WRIST/HAND MASS EXCISION;  Surgeon: Eron Dai MD;  Location: [facility];  Service: Orthopedics    GASTRECTOMY SLEEVE LAPAROSCOPIC      HYSTERECTOMY         Family History   Problem Relation Age of Onset    Hypertension Mother     Other Mother         Bladder  surgery    Obesity Mother     Cancer Mother 58        abdominal cancer    Colon cancer Mother     Diabetes Father     Hypertension Father     Heart disease Father     Obesity Father     Leukemia Sister     Obesity Sister     No Known Problems Maternal Aunt     No Known Problems Maternal Aunt     Cancer Maternal Grandmother 70        Bladder    Dementia Maternal Grandfather     Leukemia Paternal Grandmother     No Known Problems Paternal Grandfather     No Known Problems Son     No Known Problems Son     Stroke Neg Hx      I have reviewed and agree with the history as documented.    E-Cigarette/Vaping    E-Cigarette Use Never User      E-Cigarette/Vaping Substances    Nicotine No     THC No     CBD No     Flavoring No     Other No     Unknown No      Social History     Tobacco Use    Smoking status: Never     Passive exposure: Never    Smokeless tobacco: Never   Vaping Use    Vaping status: Never Used   Substance Use Topics    Alcohol use: No     Comment: per [employer]-drinks socially    Drug use: No       Review of Systems   Constitutional:  Negative for chills, diaphoresis and fever.   Respiratory:  Negative for cough, shortness of breath, wheezing and stridor.    Cardiovascular:  Negative for chest pain, palpitations and leg swelling.   Gastrointestinal:  Negative for abdominal pain, blood in stool, diarrhea, nausea and vomiting.   Genitourinary:  Positive for flank pain and hematuria. Negative for dysuria, frequency and urgency.   Musculoskeletal:  Negative for neck stiffness.   Skin:  Negative for pallor and rash.   Neurological:  Negative for dizziness, syncope, weakness, light-headedness and headaches.   All other systems reviewed and are negative.      Physical Exam  Physical Exam  Vitals reviewed.   Constitutional:       Appearance: Normal appearance. She is well-developed.   HENT:      Head: Normocephalic and atraumatic.   Eyes:      Extraocular Movements: Extraocular movements intact.      Pupils: Pupils  are equal, round, and reactive to light.   Cardiovascular:      Rate and Rhythm: Normal rate and regular rhythm.      Heart sounds: Normal heart sounds.   Pulmonary:      Effort: Pulmonary effort is normal. No respiratory distress.      Breath sounds: Normal breath sounds.   Abdominal:      General: Bowel sounds are normal.      Palpations: Abdomen is soft.      Tenderness: There is no abdominal tenderness.      Comments: Left CVA tenderness right CVA tenderness   Musculoskeletal:         General: No swelling or tenderness. Normal range of motion.      Cervical back: Normal range of motion and neck supple.   Skin:     General: Skin is warm and dry.      Capillary Refill: Capillary refill takes less than 2 seconds.   Neurological:      General: No focal deficit present.      Mental Status: She is alert and oriented to person, place, and time.         Vital Signs  ED Triage Vitals [07/29/24 1329]   Temperature Pulse Respirations Blood Pressure SpO2   98.2 °F (36.8 °C) 60 18 122/78 99 %      Temp Source Heart Rate Source Patient Position - Orthostatic VS BP Location FiO2 (%)   Oral Monitor Sitting Right arm --      Pain Score       9           Vitals:    07/29/24 1500 07/29/24 1600 07/29/24 1630 07/29/24 1700   BP: 116/70 120/74 117/63 126/71   Pulse: (!) 54 (!) 51 (!) 48 (!) 52   Patient Position - Orthostatic VS:             Visual Acuity      ED Medications  Medications   HYDROmorphone HCl (DILAUDID) injection 0.2 mg (0.2 mg Intravenous Given 7/29/24 1402)   ceftriaxone (ROCEPHIN) 1 g/50 mL in dextrose IVPB (0 mg Intravenous Stopped 7/29/24 1719)       Diagnostic Studies  Results Reviewed       Procedure Component Value Units Date/Time    Urine Microscopic [[id number]]  (Abnormal) Collected: 07/29/24 1402    Lab Status: Final result Specimen: Urine, Clean Catch Updated: 07/29/24 1605     RBC, UA Innumerable /hpf      WBC, UA 10-20 /hpf      Epithelial Cells Occasional /hpf      Bacteria, UA Occasional /hpf     Urine  culture [[id number]] Collected: 07/29/24 1402    Lab Status: In process Specimen: Urine, Clean Catch Updated: 07/29/24 1605    UA w Reflex to Microscopic w Reflex to Culture [[id number]]  (Abnormal) Collected: 07/29/24 1402    Lab Status: Final result Specimen: Urine, Clean Catch Updated: 07/29/24 1537     Color, UA Brown     Clarity, UA Turbid     Specific Gravity, UA 1.010     pH, UA 6.5     Leukocytes, UA Trace     Nitrite, UA Negative     Protein, UA 30 (1+) mg/dl      Glucose, UA Negative mg/dl      Ketones, UA Negative mg/dl      Urobilinogen, UA <2.0 mg/dl      Bilirubin, UA Negative     Occult Blood, UA Large    CBC and differential [[id number]] Collected: 07/29/24 1402    Lab Status: Final result Specimen: Blood from Arm, Right Updated: 07/29/24 1446     WBC 6.73 Thousand/uL      RBC 4.48 Million/uL      Hemoglobin 14.0 g/dL      Hematocrit 41.9 %      MCV 94 fL      MCH 31.3 pg      MCHC 33.4 g/dL      RDW 12.9 %      MPV 9.4 fL      Platelets 151 Thousands/uL      nRBC 0 /100 WBCs      Segmented % 50 %      Immature Grans % 0 %      Lymphocytes % 37 %      Monocytes % 8 %      Eosinophils Relative 4 %      Basophils Relative 1 %      Absolute Neutrophils 3.38 Thousands/µL      Absolute Immature Grans 0.02 Thousand/uL      Absolute Lymphocytes 2.52 Thousands/µL      Absolute Monocytes 0.53 Thousand/µL      Eosinophils Absolute 0.24 Thousand/µL      Basophils Absolute 0.04 Thousands/µL     Narrative:      This is an appended report.  These results have been appended to a previously verified report.    Smear Review(Phlebs Do Not Order) [[id number]] Collected: 07/29/24 1402    Lab Status: Final result Specimen: Blood from Arm, Right Updated: 07/29/24 1446     RBC Morphology Normal     Platelet Estimate Adequate    Basic metabolic panel [[id number]]  (Abnormal) Collected: 07/29/24 1402    Lab Status: Final result Specimen: Blood from Arm, Right Updated: 07/29/24 1426     Sodium 142 mmol/L      Potassium 4.2 mmol/L       Chloride 109 mmol/L      CO2 29 mmol/L      ANION GAP 4 mmol/L      BUN 14 mg/dL      Creatinine 0.96 mg/dL      Glucose 94 mg/dL      Calcium 9.1 mg/dL      eGFR 68 ml/min/1.73sq m     Narrative:      National Kidney Disease Foundation guidelines for Chronic Kidney Disease (CKD):     Stage 1 with normal or high GFR (GFR > 90 mL/min/1.73 square meters)    Stage 2 Mild CKD (GFR = 60-89 mL/min/1.73 square meters)    Stage 3A Moderate CKD (GFR = 45-59 mL/min/1.73 square meters)    Stage 3B Moderate CKD (GFR = 30-44 mL/min/1.73 square meters)    Stage 4 Severe CKD (GFR = 15-29 mL/min/1.73 square meters)    Stage 5 End Stage CKD (GFR <15 mL/min/1.73 square meters)  Note: GFR calculation is accurate only with a steady state creatinine    Lipase [[id number]]  (Normal) Collected: 07/29/24 1402    Lab Status: Final result Specimen: Blood from Arm, Right Updated: 07/29/24 1426     Lipase 66 u/L     Hepatic function panel [[id number]]  (Abnormal) Collected: 07/29/24 1402    Lab Status: Final result Specimen: Blood from Arm, Right Updated: 07/29/24 1426     Total Bilirubin 0.35 mg/dL      Bilirubin, Direct 0.05 mg/dL      Alkaline Phosphatase 55 U/L      AST 20 U/L      ALT 16 U/L      Total Protein 5.7 g/dL      Albumin 3.8 g/dL                    CT abdomen pelvis wo contrast   Final Result by Kevin Avendano DO (07/29 1511)   5 mm calculus in the left UPJ which is probably causing intermittent obstruction. There is currently no hydronephrosis. No renal colic.   The study was marked in EPIC for immediate notification.      Workstation performed: [id number]                    Procedures  Procedures         ED Course  ED Course as of 07/29/24 2218 Mon Jul 29, 2024   1431 Pain improved                                 SBIRT 20yo+      Flowsheet Row Most Recent Value   Initial Alcohol Screen: US AUDIT-C     1. How often do you have a drink containing alcohol? 0 Filed at: 07/29/2024 1330   2. How many drinks containing  alcohol do you have on a typical day you are drinking?  0 Filed at: 07/29/2024 1330   3a. Male UNDER 65: How often do you have five or more drinks on one occasion? 0 Filed at: 07/29/2024 1330   3b. FEMALE Any Age, or MALE 65+: How often do you have 4 or more drinks on one occassion? 0 Filed at: 07/29/2024 1330   Audit-C Score 0 Filed at: 07/29/2024 1330   PJ: How many times in the past year have you...    Used an illegal drug or used a prescription medication for non-medical reasons? Never Filed at: 07/29/2024 1330                      Medical Decision Making  53-year-old with flank pain, differential includes renal colic, cystitis, pyelonephritis, pancreatitis, gallbladder disease, gastritis, diverticulitis, appendicitis.  Will get CT, check abdominal labs, urine, pain control    Discussed results with patient.  She understands she needs to follow-up with family doctor, urology, gastroenterology.    Amount and/or Complexity of Data Reviewed  Labs: ordered.  Radiology: ordered.    Risk  Prescription drug management.                 Disposition  Final diagnoses:   Renal colic on left side   Hemangioma   UTI (urinary tract infection)     Time reflects when diagnosis was documented in both MDM as applicable and the Disposition within this note       Time User Action Codes Description Comment    7/29/2024  3:26 PM Letitia Steele Add [N23] Renal colic on left side     7/29/2024  3:26 PM Letitia Steele Add [D18.00] Hemangioma     7/29/2024  4:20 PM Letitia Steele Add [N39.0] UTI (urinary tract infection)           ED Disposition       ED Disposition   Discharge    Condition   Stable    Date/Time   Mon Jul 29, 2024 1620    Comment   Rosa Elena Piper discharge to home/self care.                   Follow-up Information       Follow up With Specialties Details Why Contact Info Additional Information    [facility] Schedule an appointment as soon as possible for a visit    [address] [facility]  [facility] [address]  [phone] [facility] [place], [address] [facility] [facility] [address], [place], [place], [address]   [phone]    [facility] [place] [facility] Schedule an appointment as soon as possible for a visit   [address] [facility] [address]  [facility] [id number] [phone] [facility] [place], [address] [facility] [facility] [address], [place], [place], [address] [phone]    [facility] Emergency Department Emergency Medicine  As needed, If symptoms worsen [address] [facility] [address]  [phone] [facility] Emergency Department, [address] [place], [place], [address]            Discharge Medication List as of 7/29/2024  4:24 PM        START taking these medications    Details   cefpodoxime (VANTIN) 200 mg tablet Take 1 tablet (200 mg total) by mouth 2 (two) times a day for 10 days, Starting Mon 7/29/2024, Until Thu 8/8/2024, Normal      ondansetron (ZOFRAN-ODT) 4 mg disintegrating tablet Take 1 tablet (4 mg total) by mouth every 8 (eight) hours as needed for nausea or vomiting for up to 4 days, Starting Mon 7/29/2024, Until Fri 8/2/2024 at 2359, Normal      oxyCODONE (Roxicodone) 5 immediate release tablet Take 1 tablet (5 mg total) by mouth every 6 (six) hours as needed for moderate pain or severe pain for up to 3 days Max Daily Amount: 20 mg, Starting Mon 7/29/2024, Until Thu 8/1/2024 at 2359, Normal      tamsulosin (FLOMAX) 0.4 mg Take 1 capsule (0.4 mg total) by mouth daily with dinner for 7 days, Starting Mon 7/29/2024, Until Mon 8/5/2024, Normal           CONTINUE these medications which have NOT CHANGED    Details   B COMPLEX VITAMINS PO Take 1 tablet by mouth daily, Historical Med      benzonatate (TESSALON PERLES) 100 mg capsule Take 1 capsule (100 mg total) by mouth 3 (three) times a day as needed for cough, Starting  Wed 7/3/2024, Normal      butalbital-acetaminophen-caffeine (FIORICET,ESGIC) -40 mg per tablet Take 1 tablet by mouth every 4 (four) hours as needed for headaches, Starting Wed 7/3/2024, Normal      Calcium Carb-Cholecalciferol (Calcium 500 + D) 500-3.125 MG-MCG TABS Take 1 tablet by mouth 2 (two) times a day With Magnesium, Historical Med      Dihydroergotamine Mesylate HFA (Trudhesa) 0.725 MG/ACT AERS 0.725 mg into each nostril if needed (migraine) Use at onset of migraine.  May repeat in 1 hour if needed.  Limit of 3 a week or 8 a month.  Do not use within 24 hours of a triptan., Starting Fri 4/26/2024, Normal      escitalopram (LEXAPRO) 20 mg tablet TAKE 1 TABLET DAILY, Normal      Galcanezumab-gnlm (Emgality) 120 MG/ML SOAJ Inject 1 mL under the skin every 28 days, Starting Fri 4/26/2024, Normal      levothyroxine 25 mcg tablet TAKE 1 TABLET DAILY, Normal      lisinopril (ZESTRIL) 10 mg tablet TAKE 1 TABLET DAILY, Normal      Multiple Vitamins-Iron (MULTIVITAMIN/IRON PO) Take 1 tablet by mouth 2 (two) times a day, Historical Med      predniSONE 10 mg tablet 3 tabs po bid x2 days, then 2 tabs po bid x2 days, then 1 tab bid x2 days, then 1 daily until done., Normal      Ubrogepant (UBRELVY) 100 MG tablet Take 1 tablet (100 mg) one time as needed for migraine. May repeat one additional tablet (100 mg) at least two hours after the first dose. Do not use more than two doses per day (200mg), Normal      zolpidem (AMBIEN CR) 6.25 MG CR tablet Take 1 tablet (6.25 mg total) by mouth daily at bedtime as needed for sleep, Starting Wed 7/3/2024, Normal      zonisamide (Zonegran) 100 mg capsule Take 1 capsule (100 mg total) by mouth daily at bedtime, Starting Fri 4/26/2024, Normal           STOP taking these medications       doxycycline hyclate (VIBRAMYCIN) 100 mg capsule Comments:   Reason for Stopping:                   PDMP Review         Value Time User    PDMP Reviewed  Yes 7/3/2024 10:59 AM Steve  MD Toya            ED Provider  Electronically Signed by             Letitia Steele MD  07/29/24 6880

## 2024-07-31 PROBLEM — Z71.2 PERSON CONSULTING FOR EXPLANATION OF EXAMINATION OR TEST FINDING: Status: ACTIVE | Noted: 2024-07-31

## 2024-07-31 PROBLEM — N20.1 LEFT URETERAL STONE: Status: ACTIVE | Noted: 2024-07-31

## 2024-07-31 LAB — BACTERIA UR CULT: NORMAL

## 2024-07-31 NOTE — PROGRESS NOTES
Ambulatory Visit  Name: Rosa Elena Piper      : 1970      MRN: [id number]  Encounter Provider: Jameel Orlando MD  Encounter Date: 2024   Encounter department: [facility] [place]    Assessment & Plan   1. Left ureteral stone  Assessment & Plan:  Left ureteral stone, reviewed images with Rosa Elena today, failure of MET, recommend fast track left URS and laser lithotripsy. Acknowledges possible need for staged procedure. Understands the risks of infection, bleeding, pain, damage to surrounding structures, need for additional procedures, risk of failure of the procedure, risk of anesthesia, risk of positioning complications including neurapraxia, chronic pain, and paralysis as well as risk of rhabdomyolysis and compartment syndrome.  Risk of deep venous thrombosis and venous thromboembolism as well as pneumonia and other potential but unforseeable or unpredictable complications was also discussed with the patient.    Informed consent signed    Proceed to left ureteroscopy and LL    Referral to nephrology also placed for metabolic work up given that this is her second stone episode  Orders:  -     Urine culture  -     Case request operating room: CYSTOSCOPY URETEROSCOPY WITH LITHOTRIPSY HOLMIUM LASER, RETROGRADE PYELOGRAM AND INSERTION STENT URETERAL; Standing  -     Case request operating room: CYSTOSCOPY URETEROSCOPY WITH LITHOTRIPSY HOLMIUM LASER, RETROGRADE PYELOGRAM AND INSERTION STENT URETERAL  -     Ambulatory Referral to Nephrology; Future  2. Person consulting for explanation of examination or test finding  -     Urine culture  3. Renal colic on left side  -     Ambulatory Referral to Urology  -     Urine culture  -     Case request operating room: CYSTOSCOPY URETEROSCOPY WITH LITHOTRIPSY HOLMIUM LASER, RETROGRADE PYELOGRAM AND INSERTION STENT URETERAL; Standing  -     Case request operating room: CYSTOSCOPY URETEROSCOPY WITH LITHOTRIPSY HOLMIUM LASER, RETROGRADE PYELOGRAM AND  "INSERTION STENT URETERAL            History of Present Illness     Rosa Elena Piper is a 53 y.o. female who presents with a left ureteral stone. I discussed ureteroscopy with her today    No aggravating or alleviating factors, no other associated symptoms, ongoing flank pain, no fevers or complicated UTI    The following portions of the patient's history were reviewed and updated as appropriate: allergies, current medications, past family history, past medical history, past social history, past surgical history and problem list.      Review of Systems   Constitutional: Negative.    HENT: Negative.     Eyes: Negative.    Respiratory: Negative.     Cardiovascular: Negative.    Gastrointestinal: Negative.    Endocrine: Negative.    Genitourinary:  Positive for flank pain (left).   Skin: Negative.    Allergic/Immunologic: Negative.    Neurological: Negative.    Hematological: Negative.    Psychiatric/Behavioral: Negative.           Objective     /68 (BP Location: Left arm, Patient Position: Sitting, Cuff Size: Standard)   Pulse 68   Ht 5' 5\" (1.651 m)   Wt 65.3 kg (144 lb)   LMP 11/18/2015   SpO2 99%   BMI 23.96 kg/m²   Physical Exam  Vitals reviewed.   Constitutional:       General: She is not in acute distress.     Appearance: Normal appearance. She is well-developed. She is not ill-appearing, toxic-appearing or diaphoretic.   HENT:      Head: Normocephalic and atraumatic.      Nose: Nose normal.   Eyes:      General: No scleral icterus.        Right eye: No discharge.         Left eye: No discharge.      Pupils: Pupils are equal, round, and reactive to light.   Neck:      Thyroid: No thyromegaly.      Trachea: No tracheal deviation.   Cardiovascular:      Rate and Rhythm: Normal rate and regular rhythm.   Pulmonary:      Effort: Pulmonary effort is normal. No respiratory distress.   Abdominal:      General: There is no distension.   Musculoskeletal:         General: No deformity or signs of injury.      " "Cervical back: Normal range of motion and neck supple.   Lymphadenopathy:      Cervical: No cervical adenopathy.   Skin:     Coloration: Skin is not jaundiced or pale.      Comments: Tattoos present   Neurological:      General: No focal deficit present.      Mental Status: She is alert and oriented to person, place, and time.      Motor: No abnormal muscle tone.   Psychiatric:         Mood and Affect: Mood normal.         Behavior: Behavior normal.         Thought Content: Thought content normal.         Judgment: Judgment normal.       Results  No results found for: \"PSA\"  Lab Results   Component Value Date    GLUCOSE 91 12/20/2014    CALCIUM 9.1 07/29/2024     12/20/2014    K 4.2 07/29/2024    CO2 29 07/29/2024     (H) 07/29/2024    BUN 14 07/29/2024    CREATININE 0.96 07/29/2024     Lab Results   Component Value Date    WBC 6.73 07/29/2024    HGB 14.0 07/29/2024    HCT 41.9 07/29/2024    MCV 94 07/29/2024     07/29/2024       Office Urine Dip  No results found for this or any previous visit (from the past 1 hour(s)).]    Administrative Statements           "

## 2024-07-31 NOTE — H&P (VIEW-ONLY)
Ambulatory Visit  Name: Rosa Elena Piper      : 1970      MRN: [id number]  Encounter Provider: Jameel Orlando MD  Encounter Date: 2024   Encounter department: [facility] UROLOGY [place]    Assessment & Plan   1. Left ureteral stone  Assessment & Plan:  Left ureteral stone, reviewed images with Rosa Elena today, failure of MET, recommend fast track left URS and laser lithotripsy. Acknowledges possible need for staged procedure. Understands the risks of infection, bleeding, pain, damage to surrounding structures, need for additional procedures, risk of failure of the procedure, risk of anesthesia, risk of positioning complications including neurapraxia, chronic pain, and paralysis as well as risk of rhabdomyolysis and compartment syndrome.  Risk of deep venous thrombosis and venous thromboembolism as well as pneumonia and other potential but unforseeable or unpredictable complications was also discussed with the patient.    Informed consent signed    Proceed to left ureteroscopy and LL    Referral to nephrology also placed for metabolic work up given that this is her second stone episode  Orders:  -     Urine culture  -     Case request operating room: CYSTOSCOPY URETEROSCOPY WITH LITHOTRIPSY HOLMIUM LASER, RETROGRADE PYELOGRAM AND INSERTION STENT URETERAL; Standing  -     Case request operating room: CYSTOSCOPY URETEROSCOPY WITH LITHOTRIPSY HOLMIUM LASER, RETROGRADE PYELOGRAM AND INSERTION STENT URETERAL  -     Ambulatory Referral to Nephrology; Future  2. Person consulting for explanation of examination or test finding  -     Urine culture  3. Renal colic on left side  -     Ambulatory Referral to Urology  -     Urine culture  -     Case request operating room: CYSTOSCOPY URETEROSCOPY WITH LITHOTRIPSY HOLMIUM LASER, RETROGRADE PYELOGRAM AND INSERTION STENT URETERAL; Standing  -     Case request operating room: CYSTOSCOPY URETEROSCOPY WITH LITHOTRIPSY HOLMIUM LASER, RETROGRADE PYELOGRAM AND  "INSERTION STENT URETERAL            History of Present Illness     Rosa Elena Piper is a 53 y.o. female who presents with a left ureteral stone. I discussed ureteroscopy with her today    No aggravating or alleviating factors, no other associated symptoms, ongoing flank pain, no fevers or complicated UTI    The following portions of the patient's history were reviewed and updated as appropriate: allergies, current medications, past family history, past medical history, past social history, past surgical history and problem list.      Review of Systems   Constitutional: Negative.    HENT: Negative.     Eyes: Negative.    Respiratory: Negative.     Cardiovascular: Negative.    Gastrointestinal: Negative.    Endocrine: Negative.    Genitourinary:  Positive for flank pain (left).   Skin: Negative.    Allergic/Immunologic: Negative.    Neurological: Negative.    Hematological: Negative.    Psychiatric/Behavioral: Negative.           Objective     /68 (BP Location: Left arm, Patient Position: Sitting, Cuff Size: Standard)   Pulse 68   Ht 5' 5\" (1.651 m)   Wt 65.3 kg (144 lb)   LMP 11/18/2015   SpO2 99%   BMI 23.96 kg/m²   Physical Exam  Vitals reviewed.   Constitutional:       General: She is not in acute distress.     Appearance: Normal appearance. She is well-developed. She is not ill-appearing, toxic-appearing or diaphoretic.   HENT:      Head: Normocephalic and atraumatic.      Nose: Nose normal.   Eyes:      General: No scleral icterus.        Right eye: No discharge.         Left eye: No discharge.      Pupils: Pupils are equal, round, and reactive to light.   Neck:      Thyroid: No thyromegaly.      Trachea: No tracheal deviation.   Cardiovascular:      Rate and Rhythm: Normal rate and regular rhythm.   Pulmonary:      Effort: Pulmonary effort is normal. No respiratory distress.   Abdominal:      General: There is no distension.   Musculoskeletal:         General: No deformity or signs of injury.      " "Cervical back: Normal range of motion and neck supple.   Lymphadenopathy:      Cervical: No cervical adenopathy.   Skin:     Coloration: Skin is not jaundiced or pale.      Comments: Tattoos present   Neurological:      General: No focal deficit present.      Mental Status: She is alert and oriented to person, place, and time.      Motor: No abnormal muscle tone.   Psychiatric:         Mood and Affect: Mood normal.         Behavior: Behavior normal.         Thought Content: Thought content normal.         Judgment: Judgment normal.       Results  No results found for: \"PSA\"  Lab Results   Component Value Date    GLUCOSE 91 12/20/2014    CALCIUM 9.1 07/29/2024     12/20/2014    K 4.2 07/29/2024    CO2 29 07/29/2024     (H) 07/29/2024    BUN 14 07/29/2024    CREATININE 0.96 07/29/2024     Lab Results   Component Value Date    WBC 6.73 07/29/2024    HGB 14.0 07/29/2024    HCT 41.9 07/29/2024    MCV 94 07/29/2024     07/29/2024       Office Urine Dip  No results found for this or any previous visit (from the past 1 hour(s)).]    Administrative Statements           "

## 2024-08-01 ENCOUNTER — OFFICE VISIT (OUTPATIENT)
Dept: UROLOGY | Facility: CLINIC | Age: 54
End: 2024-08-01
Payer: COMMERCIAL

## 2024-08-01 VITALS
HEIGHT: 65 IN | DIASTOLIC BLOOD PRESSURE: 68 MMHG | BODY MASS INDEX: 23.99 KG/M2 | SYSTOLIC BLOOD PRESSURE: 124 MMHG | HEART RATE: 68 BPM | OXYGEN SATURATION: 99 % | WEIGHT: 144 LBS

## 2024-08-01 DIAGNOSIS — Z71.2 PERSON CONSULTING FOR EXPLANATION OF EXAMINATION OR TEST FINDING: ICD-10-CM

## 2024-08-01 DIAGNOSIS — N23 RENAL COLIC ON LEFT SIDE: ICD-10-CM

## 2024-08-01 DIAGNOSIS — N20.1 LEFT URETERAL STONE: Primary | ICD-10-CM

## 2024-08-01 PROCEDURE — 99204 OFFICE O/P NEW MOD 45 MIN: CPT | Performed by: UROLOGY

## 2024-08-01 PROCEDURE — 87086 URINE CULTURE/COLONY COUNT: CPT | Performed by: UROLOGY

## 2024-08-01 RX ORDER — ACETAMINOPHEN 325 MG/1
975 TABLET ORAL ONCE
OUTPATIENT
Start: 2024-08-01 | End: 2024-08-01

## 2024-08-01 NOTE — ASSESSMENT & PLAN NOTE
Left ureteral stone, reviewed images with Rosa Elena montes, failure of MET, recommend fast track left URS and laser lithotripsy. Acknowledges possible need for staged procedure. Understands the risks of infection, bleeding, pain, damage to surrounding structures, need for additional procedures, risk of failure of the procedure, risk of anesthesia, risk of positioning complications including neurapraxia, chronic pain, and paralysis as well as risk of rhabdomyolysis and compartment syndrome.  Risk of deep venous thrombosis and venous thromboembolism as well as pneumonia and other potential but unforseeable or unpredictable complications was also discussed with the patient.    Informed consent signed    Proceed to left ureteroscopy and LL    Referral to nephrology also placed for metabolic work up given that this is her second stone episode

## 2024-08-02 ENCOUNTER — TELEPHONE (OUTPATIENT)
Dept: UROLOGY | Facility: CLINIC | Age: 54
End: 2024-08-02

## 2024-08-02 DIAGNOSIS — N20.1 LEFT URETERAL STONE: Primary | ICD-10-CM

## 2024-08-02 LAB — BACTERIA UR CULT: NORMAL

## 2024-08-02 NOTE — PRE-PROCEDURE INSTRUCTIONS
Pre-Surgery Instructions:   Medication Instructions    B COMPLEX VITAMINS PO Stop taking 7 days prior to surgery.    butalbital-acetaminophen-caffeine (FIORICET,ESGIC) -40 mg per tablet Uses PRN- OK to take day of surgery    Calcium Carb-Cholecalciferol (Calcium 500 + D) 500-3.125 MG-MCG TABS Stop taking 7 days prior to surgery.    cefpodoxime (VANTIN) 200 mg tablet Take day of surgery.    escitalopram (LEXAPRO) 20 mg tablet Take day of surgery.    Galcanezumab-gnlm (Emgality) 120 MG/ML SOAJ N/a    levothyroxine 25 mcg tablet Take day of surgery.    lisinopril (ZESTRIL) 10 mg tablet Hold day of surgery.    Multiple Vitamins-Iron (MULTIVITAMIN/IRON PO) Stop taking 7 days prior to surgery.    ondansetron (ZOFRAN-ODT) 4 mg disintegrating tablet Uses PRN- OK to take day of surgery    Ubrogepant (UBRELVY) 100 MG tablet Uses PRN- OK to take day of surgery    zolpidem (AMBIEN CR) 6.25 MG CR tablet Take night before surgery    zonisamide (Zonegran) 100 mg capsule Take night before surgery    Medication instructions for day surgery reviewed. Please use only a sip of water to take your instructed medications. Avoid all over the counter vitamins, supplements and NSAIDS for one week prior to surgery per anesthesia guidelines. Tylenol is ok to take as needed.     You will receive a call one business day prior to surgery with an arrival time and hospital directions. If your surgery is scheduled on a Monday, the hospital will be calling you on the Friday prior to your surgery. If you have not heard from anyone by 8pm, please call the hospital supervisor through the hospital  at [phone]. ([place] [phone] or [place] [phone]).    Do not eat or drink anything after midnight the night before your surgery, including candy, mints, lifesavers, or chewing gum. Do not drink alcohol 24hrs before your surgery. Try not to smoke at least 24hrs before your surgery.       Follow the pre surgery showering  instructions as listed in the “My Surgical Experience Booklet” or otherwise provided by your surgeon's office. Do not use a blade to shave the surgical area 1 week before surgery. It is okay to use a clean electric clippers up to 24 hours before surgery. Do not apply any lotions, creams, including makeup, cologne, deodorant, or perfumes after showering on the day of your surgery. Do not use dry shampoo, hair spray, hair gel, or any type of hair products.     No contact lenses, eye make-up, or artificial eyelashes. Remove nail polish, including gel polish, and any artificial, gel, or acrylic nails if possible. Remove all jewelry including rings and body piercing jewelry.     Wear causal clothing that is easy to take on and off. Consider your type of surgery.    Keep any valuables, jewelry, piercings at home. Please bring any specially ordered equipment (sling, braces) if indicated.    Arrange for a responsible person to drive you to and from the hospital on the day of your surgery. Please confirm the visitor policy for the day of your procedure when you receive your phone call with an arrival time.     Call the surgeon's office with any new illnesses, exposures, or additional questions prior to surgery.    Please reference your “My Surgical Experience Booklet” for additional information to prepare for your upcoming surgery.    Pt will get antibacterial soap.

## 2024-08-02 NOTE — TELEPHONE ENCOUNTER
Spoke with patient and confirmed surgery date of:8/8/2024  Type of surgery:# 5 LEFT  Operating physician: Dr. Terry  Location of surgery: [facility]    Verbally went over prep with patient on: 8/2/2024  NPO  Bowel prep? No  Hospital calls afternoon prior with arrival time -Calls Friday afternoon for Monday surgeries  Patient needs ride to and from surgery (outpatient/inpatient)   Pre-op testing to be done 2 weeks prior to surgery/URINE CULTURE ORDERED IN THE OFFICE 8/1/2024  (list labs needed)  Blood thinners:   List blood thinner/how long needs to held or no hold needed  Clearances needed: (Medical/Cardiac/None)    Mailed/emailed to patient on:EMAILED TO PT 8/2/2024  Copy of packet scanned into Media on:8/2/2024  Labs in packet  Soap / Bowel prep in packet  Pre-op & Post-op in packet  Dates of H&P and post-op if needed    Consent: in Media or on admit  If needed:SCANNED TO CHART 8/2/2024    Medical/Cardiac Clearance:  Appt with:  Appt date and time:  Date clearance form faxed:  Best fax number:    Medication Suspension of: Medication Name / how many days  Ordering provider:  Faxed Medication Suspension form on:  Best fax number:    [place]:  Faxed form to pharmacy on:    RBC blood bank done on:    Rep info:  Emailed rep on date:

## 2024-08-07 ENCOUNTER — ANESTHESIA EVENT (OUTPATIENT)
Dept: PERIOP | Facility: HOSPITAL | Age: 54
End: 2024-08-07
Payer: COMMERCIAL

## 2024-08-07 NOTE — ANESTHESIA PREPROCEDURE EVALUATION
Procedure:  CYSTOSCOPY URETEROSCOPY WITH LITHOTRIPSY HOLMIUM LASER, RETROGRADE PYELOGRAM AND INSERTION STENT URETERAL (Left: Bladder)    Relevant Problems   CARDIO   (+) Chronic migraine without aura without status migrainosus, not intractable   (+) Hypertension   (+) Migraine with aura and without status migrainosus, not intractable      ENDO   (+) Hypothyroidism      MUSCULOSKELETAL   (+) DDD (degenerative disc disease), cervical   (+) Primary osteoarthritis of first carpometacarpal joint of left hand      NEURO/PSYCH   (+) Anxiety   (+) Chronic migraine without aura without status migrainosus, not intractable   (+) Migraine with aura and without status migrainosus, not intractable      PULMONARY   (+) SAMMY (obstructive sleep apnea)        Physical Exam    Airway    Mallampati score: I  TM Distance: >3 FB  Neck ROM: full     Dental   No notable dental hx     Cardiovascular  Rhythm: regular, Rate: normal, Cardiovascular exam normal    Pulmonary  Pulmonary exam normal Breath sounds clear to auscultation    Other Findings  post-pubertal.      Anesthesia Plan  ASA Score- 2     Anesthesia Type- general with ASA Monitors.         Additional Monitors:     Airway Plan: LMA.    Comment: Risks of general anesthesia discussed including likely possibility of PONV and sore throat, as well as the rare possibilities of aspiration, dental/oropharyngeal/ocular injuries, or grave/life threatening anesthetic and surgical emergencies..       Plan Factors-Exercise tolerance (METS): >4 METS.    Chart reviewed.    Patient summary reviewed.      Patient instructed to abstain from smoking on day of procedure. Patient did not smoke on day of surgery.            Induction- intravenous.    Postoperative Plan- Plan for postoperative opioid use. Planned trial extubation        Informed Consent- Anesthetic plan and risks discussed with patient.  I personally reviewed this patient with the CRNA. Discussed and agreed on the Anesthesia Plan with  the CRNA..

## 2024-08-08 ENCOUNTER — ANESTHESIA (OUTPATIENT)
Dept: PERIOP | Facility: HOSPITAL | Age: 54
End: 2024-08-08
Payer: COMMERCIAL

## 2024-08-08 ENCOUNTER — APPOINTMENT (OUTPATIENT)
Dept: RADIOLOGY | Facility: HOSPITAL | Age: 54
End: 2024-08-08
Payer: COMMERCIAL

## 2024-08-08 ENCOUNTER — HOSPITAL ENCOUNTER (OUTPATIENT)
Facility: HOSPITAL | Age: 54
Setting detail: OUTPATIENT SURGERY
Discharge: HOME | End: 2024-08-08
Attending: UROLOGY | Admitting: UROLOGY
Payer: COMMERCIAL

## 2024-08-08 VITALS
SYSTOLIC BLOOD PRESSURE: 125 MMHG | HEART RATE: 61 BPM | BODY MASS INDEX: 23.61 KG/M2 | DIASTOLIC BLOOD PRESSURE: 68 MMHG | HEIGHT: 65 IN | RESPIRATION RATE: 16 BRPM | OXYGEN SATURATION: 100 % | WEIGHT: 141.7 LBS | TEMPERATURE: 97.1 F

## 2024-08-08 DIAGNOSIS — N23 RENAL COLIC ON LEFT SIDE: ICD-10-CM

## 2024-08-08 DIAGNOSIS — N20.1 LEFT URETERAL STONE: Primary | ICD-10-CM

## 2024-08-08 PROCEDURE — 74420 UROGRAPHY RTRGR +-KUB: CPT

## 2024-08-08 PROCEDURE — 82360 CALCULUS ASSAY QUANT: CPT | Performed by: UROLOGY

## 2024-08-08 PROCEDURE — C1758 CATHETER, URETERAL: HCPCS | Performed by: UROLOGY

## 2024-08-08 PROCEDURE — 52356 CYSTO/URETERO W/LITHOTRIPSY: CPT | Performed by: UROLOGY

## 2024-08-08 PROCEDURE — C1769 GUIDE WIRE: HCPCS | Performed by: UROLOGY

## 2024-08-08 PROCEDURE — C1747 URETEROSCOPE DIGITAL FLEX SNGL USE RVS DEFLECTION APTRA: HCPCS | Performed by: UROLOGY

## 2024-08-08 PROCEDURE — C2617 STENT, NON-COR, TEM W/O DEL: HCPCS | Performed by: UROLOGY

## 2024-08-08 DEVICE — VARIABLE LENGTH INJECTION STENT SET
Type: IMPLANTABLE DEVICE | Site: URETER | Status: FUNCTIONAL
Brand: CONTOUR VL™ INJECTION STENT SET

## 2024-08-08 RX ORDER — CEPHALEXIN 500 MG/1
500 CAPSULE ORAL EVERY 12 HOURS SCHEDULED
Qty: 6 CAPSULE | Refills: 0 | Status: SHIPPED | OUTPATIENT
Start: 2024-08-08 | End: 2024-08-11

## 2024-08-08 RX ORDER — FENTANYL CITRATE/PF 50 MCG/ML
25 SYRINGE (ML) INJECTION
Status: DISCONTINUED | OUTPATIENT
Start: 2024-08-08 | End: 2024-08-08 | Stop reason: HOSPADM

## 2024-08-08 RX ORDER — OXYBUTYNIN CHLORIDE 5 MG/1
5 TABLET ORAL EVERY 8 HOURS PRN
Qty: 15 TABLET | Refills: 0 | Status: SHIPPED | OUTPATIENT
Start: 2024-08-08

## 2024-08-08 RX ORDER — ACETAMINOPHEN 325 MG/1
975 TABLET ORAL ONCE
Status: COMPLETED | OUTPATIENT
Start: 2024-08-08 | End: 2024-08-08

## 2024-08-08 RX ORDER — HYDROCODONE BITARTRATE AND ACETAMINOPHEN 5; 325 MG/1; MG/1
1 TABLET ORAL EVERY 6 HOURS PRN
Status: DISCONTINUED | OUTPATIENT
Start: 2024-08-08 | End: 2024-08-08 | Stop reason: HOSPADM

## 2024-08-08 RX ORDER — SODIUM CHLORIDE, SODIUM LACTATE, POTASSIUM CHLORIDE, CALCIUM CHLORIDE 600; 310; 30; 20 MG/100ML; MG/100ML; MG/100ML; MG/100ML
125 INJECTION, SOLUTION INTRAVENOUS CONTINUOUS
Status: DISCONTINUED | OUTPATIENT
Start: 2024-08-08 | End: 2024-08-08 | Stop reason: HOSPADM

## 2024-08-08 RX ORDER — LIDOCAINE HYDROCHLORIDE 10 MG/ML
0.5 INJECTION, SOLUTION EPIDURAL; INFILTRATION; INTRACAUDAL; PERINEURAL ONCE AS NEEDED
Status: DISCONTINUED | OUTPATIENT
Start: 2024-08-08 | End: 2024-08-08 | Stop reason: HOSPADM

## 2024-08-08 RX ORDER — DEXAMETHASONE SODIUM PHOSPHATE 10 MG/ML
INJECTION, SOLUTION INTRAMUSCULAR; INTRAVENOUS AS NEEDED
Status: DISCONTINUED | OUTPATIENT
Start: 2024-08-08 | End: 2024-08-08

## 2024-08-08 RX ORDER — SODIUM CHLORIDE 9 MG/ML
INJECTION, SOLUTION INTRAVENOUS AS NEEDED
Status: DISCONTINUED | OUTPATIENT
Start: 2024-08-08 | End: 2024-08-08 | Stop reason: HOSPADM

## 2024-08-08 RX ORDER — FENTANYL CITRATE 50 UG/ML
INJECTION, SOLUTION INTRAMUSCULAR; INTRAVENOUS AS NEEDED
Status: DISCONTINUED | OUTPATIENT
Start: 2024-08-08 | End: 2024-08-08

## 2024-08-08 RX ORDER — SODIUM CHLORIDE, SODIUM LACTATE, POTASSIUM CHLORIDE, CALCIUM CHLORIDE 600; 310; 30; 20 MG/100ML; MG/100ML; MG/100ML; MG/100ML
INJECTION, SOLUTION INTRAVENOUS CONTINUOUS PRN
Status: DISCONTINUED | OUTPATIENT
Start: 2024-08-08 | End: 2024-08-08

## 2024-08-08 RX ORDER — ONDANSETRON 2 MG/ML
4 INJECTION INTRAMUSCULAR; INTRAVENOUS ONCE AS NEEDED
Status: DISCONTINUED | OUTPATIENT
Start: 2024-08-08 | End: 2024-08-08 | Stop reason: HOSPADM

## 2024-08-08 RX ORDER — HYDROCODONE BITARTRATE AND ACETAMINOPHEN 5; 325 MG/1; MG/1
1 TABLET ORAL EVERY 4 HOURS PRN
Qty: 5 TABLET | Refills: 0 | Status: SHIPPED | OUTPATIENT
Start: 2024-08-08 | End: 2024-08-10

## 2024-08-08 RX ORDER — OXYCODONE HYDROCHLORIDE 5 MG/1
5 TABLET ORAL EVERY 4 HOURS PRN
Qty: 5 TABLET | Refills: 0 | Status: SHIPPED | OUTPATIENT
Start: 2024-08-08 | End: 2024-08-10

## 2024-08-08 RX ORDER — LIDOCAINE HYDROCHLORIDE 10 MG/ML
INJECTION, SOLUTION EPIDURAL; INFILTRATION; INTRACAUDAL; PERINEURAL AS NEEDED
Status: DISCONTINUED | OUTPATIENT
Start: 2024-08-08 | End: 2024-08-08

## 2024-08-08 RX ORDER — HYDROMORPHONE HCL/PF 1 MG/ML
0.5 SYRINGE (ML) INJECTION
Status: DISCONTINUED | OUTPATIENT
Start: 2024-08-08 | End: 2024-08-08 | Stop reason: HOSPADM

## 2024-08-08 RX ORDER — ONDANSETRON 2 MG/ML
INJECTION INTRAMUSCULAR; INTRAVENOUS AS NEEDED
Status: DISCONTINUED | OUTPATIENT
Start: 2024-08-08 | End: 2024-08-08

## 2024-08-08 RX ORDER — KETOROLAC TROMETHAMINE 30 MG/ML
15 INJECTION, SOLUTION INTRAMUSCULAR; INTRAVENOUS EVERY 6 HOURS PRN
Status: COMPLETED | OUTPATIENT
Start: 2024-08-08 | End: 2024-08-08

## 2024-08-08 RX ORDER — PROPOFOL 10 MG/ML
INJECTION, EMULSION INTRAVENOUS AS NEEDED
Status: DISCONTINUED | OUTPATIENT
Start: 2024-08-08 | End: 2024-08-08

## 2024-08-08 RX ORDER — MAGNESIUM HYDROXIDE 1200 MG/15ML
LIQUID ORAL AS NEEDED
Status: DISCONTINUED | OUTPATIENT
Start: 2024-08-08 | End: 2024-08-08 | Stop reason: HOSPADM

## 2024-08-08 RX ORDER — CEFAZOLIN SODIUM 1 G/50ML
1000 SOLUTION INTRAVENOUS ONCE
Status: COMPLETED | OUTPATIENT
Start: 2024-08-08 | End: 2024-08-08

## 2024-08-08 RX ORDER — EPHEDRINE SULFATE 50 MG/ML
INJECTION INTRAVENOUS AS NEEDED
Status: DISCONTINUED | OUTPATIENT
Start: 2024-08-08 | End: 2024-08-08

## 2024-08-08 RX ORDER — TAMSULOSIN HYDROCHLORIDE 0.4 MG/1
0.4 CAPSULE ORAL
Qty: 7 CAPSULE | Refills: 0 | Status: SHIPPED | OUTPATIENT
Start: 2024-08-08 | End: 2024-08-15

## 2024-08-08 RX ORDER — MIDAZOLAM HYDROCHLORIDE 2 MG/2ML
INJECTION, SOLUTION INTRAMUSCULAR; INTRAVENOUS AS NEEDED
Status: DISCONTINUED | OUTPATIENT
Start: 2024-08-08 | End: 2024-08-08

## 2024-08-08 RX ADMIN — FENTANYL CITRATE 50 MCG: 50 INJECTION INTRAMUSCULAR; INTRAVENOUS at 10:00

## 2024-08-08 RX ADMIN — ONDANSETRON 4 MG: 2 INJECTION INTRAMUSCULAR; INTRAVENOUS at 10:56

## 2024-08-08 RX ADMIN — SODIUM CHLORIDE, SODIUM LACTATE, POTASSIUM CHLORIDE, AND CALCIUM CHLORIDE: .6; .31; .03; .02 INJECTION, SOLUTION INTRAVENOUS at 09:57

## 2024-08-08 RX ADMIN — FENTANYL CITRATE 25 MCG: 50 INJECTION INTRAMUSCULAR; INTRAVENOUS at 10:14

## 2024-08-08 RX ADMIN — EPHEDRINE SULFATE 5 MG: 50 INJECTION, SOLUTION INTRAVENOUS at 10:43

## 2024-08-08 RX ADMIN — MIDAZOLAM 2 MG: 1 INJECTION INTRAMUSCULAR; INTRAVENOUS at 09:57

## 2024-08-08 RX ADMIN — FENTANYL CITRATE 25 MCG: 50 INJECTION INTRAMUSCULAR; INTRAVENOUS at 10:25

## 2024-08-08 RX ADMIN — SODIUM CHLORIDE, SODIUM LACTATE, POTASSIUM CHLORIDE, AND CALCIUM CHLORIDE: .6; .31; .03; .02 INJECTION, SOLUTION INTRAVENOUS at 11:05

## 2024-08-08 RX ADMIN — LIDOCAINE HYDROCHLORIDE 50 MG: 10 INJECTION, SOLUTION EPIDURAL; INFILTRATION; INTRACAUDAL; PERINEURAL at 10:00

## 2024-08-08 RX ADMIN — PROPOFOL 200 MG: 10 INJECTION, EMULSION INTRAVENOUS at 10:00

## 2024-08-08 RX ADMIN — DEXAMETHASONE SODIUM PHOSPHATE 10 MG: 10 INJECTION, SOLUTION INTRAMUSCULAR; INTRAVENOUS at 10:00

## 2024-08-08 RX ADMIN — CEFAZOLIN SODIUM 1000 MG: 1 SOLUTION INTRAVENOUS at 10:02

## 2024-08-08 RX ADMIN — KETOROLAC TROMETHAMINE 15 MG: 30 INJECTION, SOLUTION INTRAMUSCULAR at 11:37

## 2024-08-08 RX ADMIN — ACETAMINOPHEN 975 MG: 325 TABLET, FILM COATED ORAL at 08:29

## 2024-08-08 NOTE — OP NOTE
OPERATIVE REPORT  PATIENT NAME: Rosa Elena Piper    :  1970  MRN: [id number]  Pt Location: [facility] OR ROOM 03    SURGERY DATE: 2024    Surgeons and Role:     * Kamari Terry MD - Primary    Preop Diagnosis:  Left ureteral stone [N20.1]  Renal colic on left side [N23]    Post-Op Diagnosis Codes:     * Left ureteral stone [N20.1]     * Renal colic on left side [N23]    Procedure(s):  Left - CYSTOSCOPY URETEROSCOPY WITH LITHOTRIPSY HOLMIUM LASER. RETROGRADE PYELOGRAM AND INSERTION STENT URETERAL    Specimen(s):  ID Type Source Tests Collected by Time Destination   A :  Calculus Ureter, Left STONE ANALYSIS Kamari Terry MD 2024 1024        Estimated Blood Loss:   Minimal    Drains:  Ureteral Internal Stent Left ureter 6 Fr. (Active)   Number of days: 0       Anesthesia Type:   General    Operative Indications:  Left ureteral stone [N20.1]  Renal colic on left side [N23]      Operative Findings:  Stone not at UPJ, had previously dislodged and located in midpole posterior calyx.      Complications:   None    Procedure and Technique:  The patient was identified, brought to the operating room, and placed on the table in supine position.  After induction of general anesthesia, the patient was placed in dorsal lithotomy position and prepped and draped in the usual sterile fashion.  A complete formal timeout was performed.    The 22 German rigid cystoscope was placed per urethra and cystoscopy was performed.  There was no bladder abnormality identified.  The left ureteral orifice was identified and cannulated with a Solo wire.  A dual-lumen catheter was placed followed by a second guidewire and ureteral access sheath.  The single use flexible ureteroscope was placed and the stone was identified within a mid pole posterior renal calyx. It had already dislodged from the UPJ.  A holmium laser fiber was placed and laser lithotripsy was performed.  When the stone was of suitable size, a zero tip stone  basket was placed and the stone fragments were retrieved.  At this point, a retrograde pyelogram was performed delineating the upper urinary tract anatomy.  No further filling defect identified. The ureteral length was measured. The safety wire was backloaded into the cystoscope and a multilength 6 Telugu double-J stent was placed with string.     The patient tolerated the procedure well and was transferred to the recovery room awake alert and in stable condition.    Plan: patient wishes to remove her stent/string in 5 days.   I was present for the entire procedure.    Patient Disposition:  PACU         SIGNATURE: Kamari Terry MD  DATE: August 8, 2024  TIME: 11:08 AM

## 2024-08-08 NOTE — INTERVAL H&P NOTE
H&P reviewed. After examining the patient I find no changes in the patients condition since the H&P had been written.    Vitals:    08/08/24 0827   BP: 125/69   Pulse: 58   Resp: 18   Temp: 98 °F (36.7 °C)   SpO2: 99%   Chest clear  Heart regular  Abdomen soft  Ext FROM    Procedure, risks, and benefits discussed. Consent confirmed for left ureteroscopy, laser, stone extraction, stent.   They understand she may require 2nd stage ureteroscopy.  All questions answered to their satisfaction.

## 2024-08-08 NOTE — ANESTHESIA POSTPROCEDURE EVALUATION
Post-Op Assessment Note    CV Status:  Stable  Pain Score: 0    Pain management: adequate       Mental Status:  Alert and awake   Hydration Status:  Stable   PONV Controlled:  None   Airway Patency:  Patent     Post Op Vitals Reviewed: Yes    No anethesia notable event occurred.    Staff: CRNA               BP      Temp   97.1   Pulse  78   Resp   12   SpO2   100

## 2024-08-08 NOTE — TELEPHONE ENCOUNTER
[facility] calling to inform that Norco 5-325 mg is on back order.   They have Norco 5-300 mg or 7.5-325 mg    Please advise

## 2024-08-09 NOTE — TELEPHONE ENCOUNTER
Post Op Note    Rosa Elena Piper is a 53 y.o. female s/p CYSTOSCOPY URETEROSCOPY WITH LITHOTRIPSY HOLMIUM LASER, RETROGRADE PYELOGRAM AND INSERTION STENT URETERAL (Left: Bladder) performed 8/8/2024.  Rosa Elena Piper had surgery by Dr. Terry and is seen at the [place] office.     Pain after surgery? Patient advises she does have pain and bladder spasms and the Oxybutynin & Oxycodone have been helping.     Have you had a bowel movement since surgery? No. Advised of taking stool softeners if she does not have a bowel movement.    Do you have any difficulty urinating? No. She advises her urine has been bloody. She advise she urinated blood with clots this morning. Advised to hydrate well with water. Advised blood in the urine is normal after stent placement. Advised if she continues to see blood clots in her urine to contact the office.    Do you have any other questions or concerns that I can address at this time?      Spoke to patient and she is doing well after surgery. Advised of Dr. Terry's recommendation of having the stent removed in 5 days. She advises she is comfortable removing it herself. Instructions were provided of how to remove the stent. She advises she will be able to remove it on Monday. Advised will contact her in the afternoon to see how she is doing. She is understanding. Advised of obtaining and US in 2-3 months. Order has been placed. Advised of contacting central scheduling to have this performed. Advised will contact her with the results and does not need a follow up appointment at this time. She is understanding.

## 2024-08-18 LAB
COLOR STONE: NORMAL
COM MFR STONE: 20 %
COMMENT-STONE3: NORMAL
COMPOSITION: NORMAL
HYDROXYAPATITE 24H ENGDIFF UR: 80 %
LABORATORY COMMENT REPORT: NORMAL
PHOTO: NORMAL
SIZE STONE: NORMAL MM
SPEC SOURCE SUBJ: NORMAL
STONE ANALYSIS-IMP: NORMAL
WT STONE: 15 MG

## 2024-08-22 ENCOUNTER — CONSULT (OUTPATIENT)
Dept: GASTROENTEROLOGY | Facility: CLINIC | Age: 54
End: 2024-08-22
Payer: COMMERCIAL

## 2024-08-22 VITALS
TEMPERATURE: 98.3 F | HEIGHT: 65 IN | DIASTOLIC BLOOD PRESSURE: 64 MMHG | WEIGHT: 144.6 LBS | SYSTOLIC BLOOD PRESSURE: 110 MMHG | BODY MASS INDEX: 24.09 KG/M2

## 2024-08-22 DIAGNOSIS — Z80.0 FAMILY HISTORY OF COLON CANCER: ICD-10-CM

## 2024-08-22 DIAGNOSIS — R10.11 RUQ PAIN: Primary | ICD-10-CM

## 2024-08-22 DIAGNOSIS — D18.00 HEMANGIOMA: ICD-10-CM

## 2024-08-22 PROCEDURE — 99204 OFFICE O/P NEW MOD 45 MIN: CPT | Performed by: PHYSICIAN ASSISTANT

## 2024-08-22 NOTE — PROGRESS NOTES
"[facility] - Outpatient Consultation  Rosa Elena Piper 53 y.o. female MRN: [id number]  Encounter: [id number]          ASSESSMENT AND PLAN:      Rosa Elena is a 54 y/o female with HTN, hypothyroid, SAMMY, anxiety who presents for consultation of liver hemangioma.    Liver lesion  RUQ Pain, resolving    CT on 7/29 noted \"Large low-attenuation lesion in the right hepatic lobe measuring up to 5 cm, known to be a hemangioma based on prior CT. Riedel's configuration. \" LFTs, CBC, lipase WNL. MRI at [facility] about 3 yrs ago also depicted \"5 x 5 x 5 cm benign hemangioma within segment 6 of the liver. \" This CT was done to evaluate her R-sided pain and she was found to have kidney stones, which she is still passing. Pt says the pain in her RUQ has decreased and is mainly occurring with driving, so she suspects this is due to MS strain VS kidney stones.   -explained to pt that liver lesions of this size, though hemangiomas are benign, can cause some discomfort but if her pain is resolving, there is no need to send her to the surgery team for now. I agree that as she does have a long commute daily, this could be due to MS etiology/costochondritis or can be due to her kidney stones. I explained we can proceed with EGD to rule out UGI etiology but she says as the pain is slowing down, she would rather hold off on this  -MRI ordered to better evaluate the lesion    -explained to pt that if her pain returns and it is not related to her kidney stones or MS etiology, we would indeed want her to get EGD done to rule out other etiology and if that was normal, we would send her to surg/onc     3. Family hx of colon cancer in mother   Colonoscopy in 2017 was completely WNL. Pt says her mother was recently dx with colon cancer in her late 70s  -repeat colonoscopy was initially due for 2027 but I would prefer to have this done sooner, possibly within the next year or 2 but we can await the above work-up " "first  ______________________________________________________________________    HPI:  Pt says she is here because she had a CT done that showed \"liver stuff.\" CT was done to evaluate her R-sided pain and she was found to have kidney stones, which she is still passing. Pt says the pain in her RUQ has decreased and is mainly occurring with driving, so she says that suspects this is due to MS strain VS kidney stones. She denies n/v, other abdominal pain locations, family hx of liver disease, unintentional weight loss, fevers, chills, night sweats, NSAID use, constipation, diarrhea, bloody or black BM. Pt says her mother was recently dx with colon cancer in her late 70s.       REVIEW OF SYSTEMS:    CONSTITUTIONAL: Denies any fever, chills, rigors, and weight loss.  HEENT: No earache or tinnitus. Denies hearing loss or visual disturbances.  CARDIOVASCULAR: No chest pain or palpitations.   RESPIRATORY: Denies any cough, hemoptysis, shortness of breath or dyspnea on exertion.  GASTROINTESTINAL: As noted in the History of Present Illness.   GENITOURINARY: No problems with urination. Denies any hematuria or dysuria.  NEUROLOGIC: No dizziness or vertigo, denies headaches.   MUSCULOSKELETAL: Denies any muscle or joint pain.   SKIN: Denies skin rashes or itching.   ENDOCRINE: Denies excessive thirst. Denies intolerance to heat or cold.  PSYCHOSOCIAL: Denies depression or anxiety. Denies any recent memory loss.       Historical Information   Past Medical History:   Diagnosis Date    Anxiety     Back pain     Cervicalgia     disc displacement,radiculopathy    CTS (carpal tunnel syndrome)     Depression     Disease of thyroid gland     Headache(784.0)     Headache, tension-type     Herniated cervical disc     HL (hearing loss)     Hypertension     last assessed: 8/2/2016    Kidney stone 3/2021    Migraine     Myofascial pain syndrome     Neck pain     Wears reading eyeglasses      Past Surgical History:   Procedure Laterality " Date    BREAST BIOPSY Right     biospy w/ marker    CARPAL TUNNEL RELEASE Right     COLONOSCOPY      FL RETROGRADE PYELOGRAM  8/8/2024    GANGLION CYST EXCISION Left 08/03/2017    Procedure: WRIST/HAND MASS EXCISION;  Surgeon: Eron Dai MD;  Location:  MAIN OR;  Service: Orthopedics    GASTRECTOMY SLEEVE LAPAROSCOPIC      HYSTERECTOMY      MD CYSTO/URETERO W/LITHOTRIPSY &INDWELL STENT INSRT Left 8/8/2024    Procedure: CYSTOSCOPY URETEROSCOPY WITH LITHOTRIPSY HOLMIUM LASER, RETROGRADE PYELOGRAM AND INSERTION STENT URETERAL;  Surgeon: Kamari Terry MD;  Location: [facility]IN OR;  Service: Urology     Social History   Social History     Substance and Sexual Activity   Alcohol Use Not Currently    Comment: per [employer]-drinks socially     Social History     Substance and Sexual Activity   Drug Use No     Social History     Tobacco Use   Smoking Status Never    Passive exposure: Never   Smokeless Tobacco Never     Family History   Problem Relation Age of Onset    Hypertension Mother     Other Mother         Bladder surgery    Obesity Mother     Cancer Mother 58        abdominal cancer    Colon cancer Mother     Diabetes Father     Hypertension Father     Heart disease Father     Obesity Father     Leukemia Sister     Obesity Sister     No Known Problems Maternal Aunt     No Known Problems Maternal Aunt     Cancer Maternal Grandmother 70        Bladder    Dementia Maternal Grandfather     Leukemia Paternal Grandmother     No Known Problems Paternal Grandfather     No Known Problems Son     No Known Problems Son     Stroke Neg Hx        Meds/Allergies       Current Outpatient Medications:     B COMPLEX VITAMINS PO    butalbital-acetaminophen-caffeine (FIORICET,ESGIC) -40 mg per tablet    Calcium Carb-Cholecalciferol (Calcium 500 + D) 500-3.125 MG-MCG TABS    Dihydroergotamine Mesylate HFA (Trudhesa) 0.725 MG/ACT AERS    escitalopram (LEXAPRO) 20 mg tablet    Galcanezumab-gnlm (Emgality) 120 MG/ML  SOAJ    levothyroxine 25 mcg tablet    lisinopril (ZESTRIL) 10 mg tablet    Multiple Vitamins-Iron (MULTIVITAMIN/IRON PO)    ondansetron (ZOFRAN-ODT) 4 mg disintegrating tablet    oxybutynin (DITROPAN) 5 mg tablet    tamsulosin (FLOMAX) 0.4 mg    tamsulosin (FLOMAX) 0.4 mg    Ubrogepant (UBRELVY) 100 MG tablet    zolpidem (AMBIEN CR) 6.25 MG CR tablet    zonisamide (Zonegran) 100 mg capsule    Current Facility-Administered Medications:     Botulinum Toxin Type A SOLR 200 Units, 200 Units, Injection, Q3 Months, 200 Units at 12/28/23 1326    Allergies   Allergen Reactions    Levofloxacin Anaphylaxis and Swelling     Patient reports face swelled, throat was starting to close.           Objective     Last menstrual period 11/18/2015, not currently breastfeeding. There is no height or weight on file to calculate BMI.        PHYSICAL EXAM:      General Appearance:   Alert, cooperative, no distress   HEENT:   Normocephalic, atraumatic, anicteric.     Neck:  Supple, symmetrical, trachea midline   Lungs:   Clear to auscultation bilaterally; no rales, rhonchi or wheezing; respirations unlabored    Heart::   Regular rate and rhythm; no murmur, rub, or gallop.   Abdomen:   Soft, non-tender, non-distended; normal bowel sounds; no masses, no organomegaly    Genitalia:   Deferred    Rectal:   Deferred    Extremities:  No cyanosis, clubbing or edema    Pulses:  2+ and symmetric    Skin:  No jaundice, rashes, or lesions    Lymph nodes:  No palpable cervical lymphadenopathy        Lab Results:   No visits with results within 1 Day(s) from this visit.   Latest known visit with results is:   Admission on 08/08/2024, Discharged on 08/08/2024   Component Date Value    COLOR 08/08/2024 Perry     Size 08/08/2024 3x2     Stone Weight 08/08/2024 15     Composition 08/08/2024 Comment     Ca Oxalate,Monohydr. 08/08/2024 20     Comment 08/08/2024 Comment     STONE COMMENT 08/08/2024 Comment     STONE COMMENT 08/08/2024 Comment     Please note  08/08/2024 Comment     Disclaimer 08/08/2024 Comment     Photo 08/08/2024 Comment     Stone Source 08/08/2024 Comment     Hydroxyapatite 08/08/2024 80          Radiology Results:   FL retrograde pyelogram    Result Date: 8/13/2024  Narrative: LEFT RETROGRADE PYELOGRAM INDICATION: Left ureteral stone [N20.1]. COMPARISON: 7/29/2024 IMAGES: 5 FLUOROSCOPY TIME: 30 seconds CONTRAST: 4 mL of iohexol (OMNIPAQUE) FINDINGS: Initial images with contrast demonstrate mild left-sided hydronephrosis. Imaging guidance was provided during the urological procedure and deployment of a left ureteral stent. Osseous and soft tissue detail limited by technique.     Impression: Fluoroscopic guidance provided for left retrograde pyelogram. Please see separate procedure report for additional details. Workstation performed: [id number]     CT abdomen pelvis wo contrast    Result Date: 7/29/2024  Narrative: CT ABDOMEN AND PELVIS WITHOUT IV CONTRAST INDICATION: flank pain, hematuria. COMPARISON: June 18, 2021 TECHNIQUE: CT examination of the abdomen and pelvis was performed without intravenous contrast. Multiplanar 2D reformatted images were created from the source data. This examination, like all CT scans performed in the [facility], was performed utilizing techniques to minimize radiation dose exposure, including the use of iterative reconstruction and automated exposure control. Radiation dose length product (DLP) for this visit: 503 mGy-cm Enteric Contrast: Not administered. FINDINGS: ABDOMEN LOWER CHEST: No clinically significant abnormality in the visualized lower chest. LIVER/BILIARY TREE: Large low-attenuation lesion in the right hepatic lobe measuring up to 5 cm, known to be a hemangioma based on prior CT. Riedel's configuration. GALLBLADDER: No calcified gallstones. No pericholecystic inflammatory change. SPLEEN: Unremarkable. PANCREAS: Unremarkable. ADRENAL GLANDS: Unremarkable. KIDNEYS/URETERS: Right kidney: Punctate  nonobstructing calculi. No hydronephrosis. No renal colic. Left kidney: Nonobstructing lower pole collecting system calculi measuring up to 3 mm. There is a 5 mm calculus in the left UPJ which is probably causing intermittent obstruction. No hydronephrosis. No hydroureter or renal colic. STOMACH AND BOWEL: Limited evaluation of GI tract without oral contrast. Sleeve gastrectomy. No obstruction or perforation. APPENDIX: No findings to suggest appendicitis. ABDOMINOPELVIC CAVITY: No ascites. No pneumoperitoneum. No lymphadenopathy. VESSELS: Unremarkable for patient's age. PELVIS REPRODUCTIVE ORGANS: Unremarkable for patient's age. URINARY BLADDER: Unremarkable. ABDOMINAL WALL/INGUINAL REGIONS: Tiny fat-containing umbilical hernia. BONES: No acute fracture or suspicious osseous lesion. Spinal degenerative changes.     Impression: 5 mm calculus in the left UPJ which is probably causing intermittent obstruction. There is currently no hydronephrosis. No renal colic. The study was marked in EPIC for immediate notification. Workstation performed: [id number]     US bedside procedure    Result Date: 7/29/2024  Narrative: [id number]    US kidney and bladder    Result Date: 7/26/2024  Narrative: RENAL ULTRASOUND INDICATION: R31.9: Hematuria, unspecified. COMPARISON: CT abdomen/pelvis 6/18/2021. TECHNIQUE: Ultrasound of the retroperitoneum was performed with a curvilinear transducer utilizing volumetric sweeps and still imaging techniques. FINDINGS: KIDNEYS: Symmetric and normal size. Right kidney: 10.6 x 5.0 x 5.6 cm. Volume 154.7 mL Left kidney: 10.9 x 5.4 x 4.7 cm. Volume 144.0 mL Right kidney Normal echogenicity and contour. No mass is identified. No hydronephrosis. Interpolar shadowing calculus measures 2 mm. No perinephric fluid collections. Left kidney Normal echogenicity and contour. No mass is identified. Lower pole milk of calcium cyst measures 9 mm. No hydronephrosis. Lower pole  shadowing calculus measures 2 mm. No perinephric fluid collections. URETERS: Nonvisualized. BLADDER: Normally distended. No focal thickening or mass lesions. Bilateral ureteral jets detected. There is an echogenic 2 mm focus with twinkle artifact directly adjacent to the left ureterovesical junction which may represent ureteral calculus. Redemonstration of 2 hemangiomas in the right lobe of the liver which measure 5.9 x 4.7 x 5.1 cm and 1.3 x 1.7 x 1.8 cm.     Impression: 1.  Bilateral nonobstructing nephrolithiasis. 2.  Possible calculus near the left ureterovesical junction. The ureteral jets are seen. Workstation performed: [id number]

## 2024-08-25 DIAGNOSIS — G47.00 INSOMNIA, UNSPECIFIED TYPE: ICD-10-CM

## 2024-08-26 RX ORDER — ZOLPIDEM TARTRATE 6.25 MG/1
6.25 TABLET, FILM COATED, EXTENDED RELEASE ORAL
Qty: 30 TABLET | Refills: 0 | Status: SHIPPED | OUTPATIENT
Start: 2024-08-26

## 2024-08-26 NOTE — TELEPHONE ENCOUNTER
1 [id number] 07/29/2024 07/29/2024 oxyCODONE HCL (Tablet) 12.0 3 5 MG 30.0 FLORY KASH [facility] Commercial Insurance 0 / 0 [place]    1 [id number] 07/07/2024 07/03/2024 Butalbital-acetaminophen-caffeine (Tablet) 30.0 5 325 MG-50 MG-40 MG NA ALBERTO SHETH POGODZINSKI [facility] Commercial Insurance 0 / 0 [place]    2 [id number] 07/03/2024 07/03/2024 Zolpidem Tartrate (Tablet, Extended Release) 30.0 30 6.25 MG NA [facility] Commercial Insurance 0 / 0 [place]    2 [id number] 05/14/2024 05/14/2024 Zolpidem Tartrate (Tablet, Extended Release) 30.0 30 6.25 MG NA OLIVER CHANEL [facility] Commercial Insurance 0 / 0 [place]    1 [id number] 05/08/2024 05/03/2024 Butalbital-acetaminophen-caffeine (Tablet) 30.0 5 325 MG-50 MG-40 MG NA OLIVER CHANEL [facility] Commercial Insurance 0 / 0 [place]    2 [id number] 03/18/2024 03/18/2024 Zolpidem Tartrate (Tablet, Extended Release) 30.0 30 6.25 MG NA LOVELY GRAHAM [facility] Commercial Insurance 0 / 0 [place]    1 [id number] 01/24/2024 01/22/2024 Butalbital-acetaminophen-caffeine (Tablet) 30.0 5 325 MG-50 MG-40 MG NA OLIVER CHANEL [facility] Commercial Insurance 0 / 0 [place]    2 [id number] 01/22/2024 01/22/2024 Zolpidem Tartrate (Tablet, Extended Release) 30.0 30 6.25 MG NA OLIVER CHANEL [facility] Commercial Insurance 0 / 0 [place]    1 [id number] 11/27/2023 11/27/2023 Zolpidem Tartrate (Tablet, Extended Release) 30.0 30 6.25 MG [facility] Commercial [facility] 0 / 0 [place]    1 [id number] 09/26/2023 09/26/2023 Zolpidem Tartrate (Tablet, Extended Release) 30.0 30 6.25 MG NA [facility] Commercial Insurance 0 / 0 [place]

## 2024-08-30 ENCOUNTER — TELEPHONE (OUTPATIENT)
Age: 54
End: 2024-08-30

## 2024-08-30 NOTE — TELEPHONE ENCOUNTER
Pt's August 2nd botox appt was cancelled due to botox never having been delivered, she'd like a status update to see if we have received the botox.

## 2024-09-03 DIAGNOSIS — G43.709 CHRONIC MIGRAINE WITHOUT AURA WITHOUT STATUS MIGRAINOSUS, NOT INTRACTABLE: Primary | ICD-10-CM

## 2024-09-03 RX ORDER — ONABOTULINUMTOXINA 200 [USP'U]/1
INJECTION, POWDER, LYOPHILIZED, FOR SOLUTION INTRADERMAL; INTRAMUSCULAR
Qty: 1 EACH | Refills: 4 | Status: SHIPPED | OUTPATIENT
Start: 2024-09-03

## 2024-09-05 ENCOUNTER — HOSPITAL ENCOUNTER (OUTPATIENT)
Dept: MRI IMAGING | Facility: HOSPITAL | Age: 54
End: 2024-09-05
Payer: COMMERCIAL

## 2024-09-05 DIAGNOSIS — D18.00 HEMANGIOMA: ICD-10-CM

## 2024-09-05 PROCEDURE — A9585 GADOBUTROL INJECTION: HCPCS | Performed by: PHYSICIAN ASSISTANT

## 2024-09-05 PROCEDURE — 74183 MRI ABD W/O CNTR FLWD CNTR: CPT

## 2024-09-05 RX ORDER — GADOBUTROL 604.72 MG/ML
6 INJECTION INTRAVENOUS
Status: COMPLETED | OUTPATIENT
Start: 2024-09-05 | End: 2024-09-05

## 2024-09-05 RX ADMIN — GADOBUTROL 6 ML: 604.72 INJECTION INTRAVENOUS at 16:08

## 2024-09-09 ENCOUNTER — TELEPHONE (OUTPATIENT)
Dept: GASTROENTEROLOGY | Facility: CLINIC | Age: 54
End: 2024-09-09

## 2024-09-09 NOTE — PROGRESS NOTES
[facility] [place] [facility]GRESS NOTE        Attending:   Sandy Trujillo MD    Resident:   DO Michel Rodriguez M.D.    PATIENT:   Miri Joseph; [id number]; 1940    ID:   84 y.o. male with history of dyslipidemia, BPH, type 2 DM, and recent GI bleed transferred from [facility] admitted for symptomatic pancytopenia and bone marrow biopsy. He was seen by GI at [facility] and had bidirectional endoscopy on 9/5 with biopsies taken from endoscopy but colonoscopy could not be completed due to inadequate bowel prep. Hgb stable but WBC and platelets continued to drop.  Transferred here for bone marrow biopsy.  Now status post platelet transfusion on 9/7.    SUBJECTIVE:   This morning: The patient reports that he continues to feel well.  No complaints.  Son accompanies him this morning and updated on plan of care and biopsy which is now scheduled for 11 AM tomorrow.  She denies headache, dizziness, lightheadedness, chest pain, palpitation, dyspnea, abdominal pain, new bruising/bleeding.    OBJECTIVE:  Vitals:    09/09/24 0040 09/09/24 0414 09/09/24 0906 09/09/24 1250   BP: 112/71 131/72 (!) 143/70 109/58   Pulse: 68 65 65 83   Resp: 18 17 16 16   Temp: 36.2 °C (97.2 °F) 36.6 °C (97.9 °F) 36.2 °C (97.1 °F) 36.5 °C (97.7 °F)   TempSrc: Oral Oral Oral Oral   SpO2: 97% 98% 92% 98%   Weight:       Height:           No intake or output data in the 24 hours ending 09/09/24 1537    PHYSICAL EXAM:  General: No acute distress, afebrile, initially ambulating about room then sitting up on the edge of the bed, smiling and conversational  HEENT: NC/AT. EOMI, no pallor  Cardiovascular: RRR without murmurs. Normal capillary refill   Respiratory: CTAB  Abdomen: soft, nontender, nondistended, no masses  EXT:  SOL, no edema  Skin: No erythema/lesions, no bruises, no petechia  Neuro: Non-focal    LABS:  Recent Labs     09/08/24  0326 09/08/24  1501 09/09/24  0527   WBC 2.6* 2.1* 3.4*   RBC 2.72* 2.74*  The patient presented in the [facility] office today for a Toradol injection  Patient received the injection in the right gluteus leonel and a compazine injection in the left gluteus leonel and tolerated this injection well  No questions or concerns at this time  "3.01*   HEMOGLOBIN 8.6* 9.0* 9.8*   HEMATOCRIT 24.4* 25.0* 27.0*   MCV 89.7 91.2 89.7   MCH 31.6 32.8 32.6   RDW 49.1 50.4* 48.0   PLATELETCT 65* 55* 48*   MPV 9.7 10.1 9.7   NEUTSPOLYS 10.80* 11.70* 7.80*   LYMPHOCYTES 84.70* 82.50* 89.60*   MONOCYTES 1.80 2.90 1.70   EOSINOPHILS 0.90 2.90 0.90   BASOPHILS 1.80 0.00 0.00   RBCMORPHOLO Present Present Present     Recent Labs     09/07/24  0333 09/09/24  0527   SODIUM 138 136   POTASSIUM 3.7 4.0   CHLORIDE 104 102   CO2 20 24   BUN 9 13   CREATININE 0.67 0.82   CALCIUM 9.2 9.0   ALBUMIN 3.7  --      Estimated GFR/CRCL = Estimated Creatinine Clearance: 64.9 mL/min (by C-G formula based on SCr of 0.82 mg/dL).  Recent Labs     09/07/24  0333 09/09/24  0527   GLUCOSE 154* 163*     Recent Labs     09/07/24  0333   ASTSGOT 36   ALTSGPT 27   TBILIRUBIN 1.0   ALKPHOSPHAT 78   GLOBULIN 3.2             No results for input(s): \"INR\", \"APTT\", \"FIBRINOGEN\" in the last 72 hours.    Invalid input(s): \"DIMER\"      IMAGING:  No orders to display       MEDS:  Current Facility-Administered Medications   Medication Last Admin    cyanocobalamin (Vitamin B-12) injection 1,000 mcg 1,000 mcg at 09/09/24 1151    folic acid (Folvite) tablet 1 mg 1 mg at 09/09/24 1151    acetaminophen (Tylenol) tablet 650 mg      diphenhydrAMINE (Benadryl) tablet/capsule 25 mg      Or    diphenhydrAMINE (Benadryl) injection 25 mg      insulin regular (HumuLIN R,NovoLIN R) injection 2 Units at 09/09/24 1206    And    dextrose 50% (D50W) injection 25 g      pantoprazole (Protonix) injection 40 mg 40 mg at 09/09/24 0535    acetaminophen (Tylenol) tablet 650 mg      ondansetron (Zofran ODT) dispertab 4 mg      ondansetron (Zofran) syringe/vial injection 4 mg      atorvastatin (Lipitor) tablet 10 mg 10 mg at 09/08/24 2049       ASSESSMENT/PLAN:  84 y.o. male with history of dyslipidemia, BPH, type 2 DM, and recent GI bleed transferred from [facility] admitted for symptomatic pancytopenia " and bone marrow biopsy. He was seen by GI at [facility] and had bidirectional endoscopy on 9/5 with biopsies taken from endoscopy but colonoscopy could not be completed due to inadequate bowel prep. Hgb stable but WBC and platelets continue to downtrend, now with critically low absolute neutrophils. Status post platelet transfusion on 9/7.  Bone marrow biopsy planned for 9/9.    * Pancytopenia (HCC)- (present on admission)  Assessment & Plan  Continues to have downtrending white blood cells, absolute neutrophils critical at 0.27.  Protective precautions in place.  Vital signs stable, afebrile, ROS negative.    Plan:  -CBC q12h  -Transfuse platelets goal >10,000  -Transfuse pRBCs for Hb goal 7  -Neutropenic precautions  -Parvo, EBV and CMV antibodies ordered  -Oncology consulted, patient evaluated by Dr. Beltrán.   -Continue supportive measures and B12  -Transfuse for platelets < 20,000, hemoglobin < 7  -Bone marrow biopsy planned for 9/9 at 11 AM  -N.p.o. at midnight    Dyslipidemia  Assessment & Plan  Continue home atorvastatin.     B12 deficiency- (present on admission)  Assessment & Plan  Completed 5-day course of daily IM vitamin B12.  Oncology Dr. Long notes undetectable B12; he recommends daily injections of B12 x 2 weeks and then can switch to weekly x 1 month, and then monthly from thereon.  We will follow these recommendations.  -Vit B12 1,000mcg x 2 weeks   -Then vitamin B12 1000 mcg weekly x 1 month   -Then vitamin B12 1000 mcg monthly  -Recommend considering oral supplementation at discharge for vegetarian diet, for now the plan will be IM; patient should follow-up on this outpatient    Type 2 diabetes mellitus, without long-term current use of insulin (HCC)- (present on admission)  Assessment & Plan  On metformin, glipizide, and Januvia at home.   -Hold home medications  -Continue CHO diet and SSI + hypoglycemia protocol     GI bleed- (present on admission)  Assessment & Plan  Stable. No active bleeding  at this time.  Vital signs stable, no signs of bleeding or bruising on exam.  Pathology from duodenal biopsy shows no significant abnormalities.  Gastric antrum biopsy shows mild chronic active gastritis negative for H. pylor.  Gastric body and fundus biopsy shows minimal chronic inactive gastritis and again notes negative for H. pylori and no evidence of autoimmune gastritis.    Plan:  -Continue daily PPI  -Outpatient follow-up with GI for repeat colonoscopy as initial did not have complete prep    Thrombocytopenia (HCC)- (present on admission)  Assessment & Plan  Continues to downtrend.  Platelets transfused the evening of 9/7.  Vital signs stable, no signs of bleeding or bruising.    Plan:  -CBC q12h  -Transfuse w/ platelets <20,000 per hematology/oncology   -Bone marrow biopsy planned for Monday       Core Measures  IV Fluids: none  Diet: vegetarian with carb count, n.p.o. at midnight  Antbx: none  DVT ppx: SCDs, chemical prophylaxis held in setting of thrombocytopenia, patient encouraged to ambulate  Code status: Full Code  Dispo: inpatient for supportive care, monitoring of severe thrombocytopenia, bone marrow biopsy    Michel Bean M.D.  PGY-2  [facility] Resident

## 2024-09-09 NOTE — TELEPHONE ENCOUNTER
I spoke to patient and informed her of the results, patient is feeling good and has no further questions at this time, thank you

## 2024-09-09 NOTE — TELEPHONE ENCOUNTER
----- Message from Radha Fox PA-C sent at 9/9/2024  2:14 PM EDT -----  Please let pt know MRI noted her larger hemangioma (collection of blood vessels) in her liver appears stable and without suspicious features. Her other hemangioma is much smaller and has actually decreased in size. How is she feeling? Thanks!

## 2024-09-17 ENCOUNTER — PROCEDURE VISIT (OUTPATIENT)
Dept: NEUROLOGY | Facility: CLINIC | Age: 54
End: 2024-09-17
Payer: COMMERCIAL

## 2024-09-17 VITALS — TEMPERATURE: 98.9 F | DIASTOLIC BLOOD PRESSURE: 76 MMHG | SYSTOLIC BLOOD PRESSURE: 119 MMHG | HEART RATE: 70 BPM

## 2024-09-17 DIAGNOSIS — G43.709 CHRONIC MIGRAINE WITHOUT AURA WITHOUT STATUS MIGRAINOSUS, NOT INTRACTABLE: Primary | ICD-10-CM

## 2024-09-17 PROCEDURE — 64615 CHEMODENERV MUSC MIGRAINE: CPT | Performed by: PHYSICIAN ASSISTANT

## 2024-09-17 NOTE — PROGRESS NOTES
"Universal Protocol   procedure performed by consultantConsent: Verbal consent obtained. Written consent obtained.  Risks and benefits: risks, benefits and alternatives were discussed  Consent given by: patient  Time out: Immediately prior to procedure a \"time out\" was called to verify the correct patient, procedure, equipment, support staff and site/side marked as required.  Patient understanding: patient states understanding of the procedure being performed  Patient consent: the patient's understanding of the procedure matches consent given  Procedure consent: procedure consent matches procedure scheduled  Relevant documents: relevant documents present and verified  Patient identity confirmed: verbally with patient      Chemodenervation     Date/Time  9/17/2024 7:30 AM     Performed by  Rosmery Geronimo PA-C   Authorized by  Rosmery Greonimo PA-C     Pre-procedure details      Prepped With: Alcohol     Anesthesia  (see MAR for exact dosages):     Anesthesia method:  None   Procedure details      Position:  Upright   Botox      Botox Type:  Type A    Brand:  Botox    mL's of Botulinum Toxin:  200    Final Concentration per CC:  50 units    Needle Gauge:  30 G 2.5 inch   Procedures      Botox Procedures: chronic headache      Indications: migraines     Injection Location      Head / Face:  L superior cervical paraspinal, R superior cervical paraspinal, L , R , L frontalis, R frontalis, L medial occipitalis, R medial occipitalis, procerus, R temporalis, L temporalis, R superior trapezius and L superior trapezius    L  injection amount:  5 unit(s)    R  injection amount:  5 unit(s)    L lateral frontalis:  5 unit(s)    R lateral frontalis:  5 unit(s)    L medial frontalis:  5 unit(s)    R medial frontalis:  5 unit(s)    L temporalis injection amount:  20 unit(s)    R temporalis injection amount:  20 unit(s)    Procerus injection amount:  5 unit(s)    L medial occipitalis injection " amount:  15 unit(s)    R medial occipitalis injection amount:  15 unit(s)    L superior cervical paraspinal injection amount:  10 unit(s)    R superior cervical paraspinal injection amount:  10 unit(s)    L superior trapezius injection amount:  15 unit(s)    R superior trapezius injection amount:  15 unit(s)   Total Units      Total units used:  200    Total units discarded:  0   Post-procedure details      Chemodenervation:  Chronic migraine    Facial Nerve Location::  Bilateral facial nerve    Patient tolerance of procedure:  Tolerated well, no immediate complications   Comments       10 units temporalis bilaterally  5 units lower temporalis bilaterally  15 units occipitalis/cervical paraspinal  All medically necessary

## 2024-09-30 ENCOUNTER — OFFICE VISIT (OUTPATIENT)
Dept: URGENT CARE | Facility: CLINIC | Age: 54
End: 2024-09-30
Payer: COMMERCIAL

## 2024-09-30 VITALS
TEMPERATURE: 98.4 F | DIASTOLIC BLOOD PRESSURE: 84 MMHG | RESPIRATION RATE: 20 BRPM | OXYGEN SATURATION: 100 % | HEART RATE: 58 BPM | SYSTOLIC BLOOD PRESSURE: 123 MMHG

## 2024-09-30 DIAGNOSIS — B97.89 ACUTE VIRAL SINUSITIS: Primary | ICD-10-CM

## 2024-09-30 DIAGNOSIS — J01.90 ACUTE VIRAL SINUSITIS: Primary | ICD-10-CM

## 2024-09-30 PROCEDURE — 99213 OFFICE O/P EST LOW 20 MIN: CPT | Performed by: STUDENT IN AN ORGANIZED HEALTH CARE EDUCATION/TRAINING PROGRAM

## 2024-09-30 RX ORDER — BROMPHENIRAMINE MALEATE, PSEUDOEPHEDRINE HYDROCHLORIDE, AND DEXTROMETHORPHAN HYDROBROMIDE 2; 30; 10 MG/5ML; MG/5ML; MG/5ML
5 SYRUP ORAL 4 TIMES DAILY PRN
Qty: 120 ML | Refills: 0 | Status: SHIPPED | OUTPATIENT
Start: 2024-09-30

## 2024-09-30 NOTE — PATIENT INSTRUCTIONS
PATIENT INSTRUCTIONS    Take Bromfed as prescribed.       As of 2023, FDA has declared Phenylephrine (Sudafed PE) is NOT an effective decongestant.   This is different from PLAIN Sudafed, Pseudoephedrine, which is still considered effective.        AAFP Article from 2020- Clinical Diagnosis of Acute Bacterial Rhinosinusitis  ITZ HURTADO, [facility], OLIVIER, AND ARIANE MAURICE MD, Health Research Board, [employer] in [place]     Acute rhinosinusitis in adults is defined as sinonasal inflammation that lasts less than four weeks and is associated with the sudden onset of symptoms.1 In the 2012 National Health Interview Survey, 12% of respondents reported being diagnosed with rhinosinusitis in the previous 12 months.2 In 2016, there were 8 million [place] ambulatory visits for acute sinusitis.3 Acute bacterial rhinosinusitis develops in only 0.5% to 2% of all upper respiratory tract infections.4  A 2018 Sarah review demonstrated that the potential benefit of antibiotics for the treatment of acute rhinosinusitis, diagnosed clinically or confirmed with imaging, is marginal.5 Without antibiotics, rhinosinusitis resolved in 46% of patients after one week and in 64% of patients after 14 days.5 Antibiotics can shorten time to resolution but in only five to 11 more people per 100 compared with placebo or no treatment.      If tests have been performed at [facility], our office will contact you with results if changes need to be made to the care plan discussed with you at the visit.  You can review your full results on [facility] MyChart.    Follow up with PCP.     If any of the following occur, please report to your nearest ED for evaluation or call 911.   Difficultly breathing or shortness of breath  Chest pain  Acutely worsening symptoms.

## 2024-09-30 NOTE — PROGRESS NOTES
[facility]        NAME: Rosa Elena Piper is a 53 y.o. adult  : 1970    MRN: [id number]  DATE: 2024  TIME: 4:09 PM    Assessment and Orders   Acute viral sinusitis [J01.90, B97.89]  1. Acute viral sinusitis  brompheniramine-pseudoephedrine-DM 30-2-10 MG/5ML syrup            Plan and Discussion      Symptoms and exam consistent with viral sinusitis. Will treat with oral Bromfed.     Risks and benefits discussed. Patient understands and agrees with the plan.     PATIENT INSTRUCTIONS    Take Bromfed as prescribed.       As of , FDA has declared Phenylephrine (Sudafed PE) is NOT an effective decongestant.   This is different from PLAIN Sudafed, Pseudoephedrine, which is still considered effective.        AAFP Article from 2020- Clinical Diagnosis of Acute Bacterial Rhinosinusitis  ITZ HURTADO, [facility], [employer] [facility], AND ARIANE MAURICE MD, Health Research Board, [employer] in [place], [facility]     Acute rhinosinusitis in adults is defined as sinonasal inflammation that lasts less than four weeks and is associated with the sudden onset of symptoms.1 In the 2012 National Health Interview Survey, 12% of respondents reported being diagnosed with rhinosinusitis in the previous 12 months.2 In 2016, there were 8 million [place] ambulatory visits for acute sinusitis.3 Acute bacterial rhinosinusitis develops in only 0.5% to 2% of all upper respiratory tract infections.4  A 2018 Sarah review demonstrated that the potential benefit of antibiotics for the treatment of acute rhinosinusitis, diagnosed clinically or confirmed with imaging, is marginal.5 Without antibiotics, rhinosinusitis resolved in 46% of patients after one week and in 64% of patients after 14 days.5 Antibiotics can shorten time to resolution but in only five to 11 more people per 100 compared with placebo or no treatment.      If tests have been performed at [facility], our office will contact you with results if changes  need to be made to the care plan discussed with you at the visit.  You can review your full results on [facility].    Follow up with PCP.     If any of the following occur, please report to your nearest ED for evaluation or call 911.   Difficultly breathing or shortness of breath  Chest pain  Acutely worsening symptoms.         Chief Complaint     Chief Complaint   Patient presents with    Nasal Congestion         History of Present Illness       Sinusitis  This is a new problem. The current episode started in the past 7 days. The problem has been gradually worsening since onset. There has been no fever. Associated symptoms include congestion, coughing, ear pain, headaches, sinus pressure and a sore throat. Past treatments include acetaminophen.       Review of Systems   Review of Systems   HENT:  Positive for congestion, ear pain, sinus pressure and sore throat.    Respiratory:  Positive for cough.    Neurological:  Positive for headaches.         Current Medications       Current Outpatient Medications:     B COMPLEX VITAMINS PO, Take 1 tablet by mouth daily, Disp: , Rfl:     Botulinum Toxin Type A (Botox) 200 units SOLR, INJECT UP  UNITS IN THE MUSCLE TO HEAD AND NECK EVERY 3 MONTHS, Disp: 1 each, Rfl: 4    brompheniramine-pseudoephedrine-DM 30-2-10 MG/5ML syrup, Take 5 mL by mouth 4 (four) times a day as needed for allergies, congestion or cough, Disp: 120 mL, Rfl: 0    butalbital-acetaminophen-caffeine (FIORICET,ESGIC) -40 mg per tablet, Take 1 tablet by mouth every 4 (four) hours as needed for headaches, Disp: 30 tablet, Rfl: 0    Calcium Carb-Cholecalciferol (Calcium 500 + D) 500-3.125 MG-MCG TABS, Take 1 tablet by mouth 2 (two) times a day With Magnesium, Disp: , Rfl:     escitalopram (LEXAPRO) 20 mg tablet, TAKE 1 TABLET DAILY, Disp: 90 tablet, Rfl: 1    Galcanezumab-gnlm (Emgality) 120 MG/ML SOAJ, Inject 1 mL under the skin every 28 days, Disp: 3 mL, Rfl: 3    levothyroxine 25 mcg  tablet, TAKE 1 TABLET DAILY, Disp: 90 tablet, Rfl: 3    lisinopril (ZESTRIL) 10 mg tablet, TAKE 1 TABLET DAILY, Disp: 90 tablet, Rfl: 1    Multiple Vitamins-Iron (MULTIVITAMIN/IRON PO), Take 1 tablet by mouth 2 (two) times a day, Disp: , Rfl:     Ubrogepant (UBRELVY) 100 MG tablet, Take 1 tablet (100 mg) one time as needed for migraine. May repeat one additional tablet (100 mg) at least two hours after the first dose. Do not use more than two doses per day (200mg), Disp: 16 tablet, Rfl: 3    zolpidem (AMBIEN CR) 6.25 MG CR tablet, Take 1 tablet (6.25 mg total) by mouth daily at bedtime as needed for sleep, Disp: 30 tablet, Rfl: 0    zonisamide (Zonegran) 100 mg capsule, Take 1 capsule (100 mg total) by mouth daily at bedtime, Disp: 90 capsule, Rfl: 3    Dihydroergotamine Mesylate HFA (Trudhesa) 0.725 MG/ACT AERS, 0.725 mg into each nostril if needed (migraine) Use at onset of migraine.  May repeat in 1 hour if needed.  Limit of 3 a week or 8 a month.  Do not use within 24 hours of a triptan. (Patient not taking: Reported on 9/30/2024), Disp: 8 mL, Rfl: 11    ondansetron (ZOFRAN-ODT) 4 mg disintegrating tablet, Take 1 tablet (4 mg total) by mouth every 8 (eight) hours as needed for nausea or vomiting for up to 4 days, Disp: 12 tablet, Rfl: 0    oxybutynin (DITROPAN) 5 mg tablet, Take 1 tablet (5 mg total) by mouth every 8 (eight) hours as needed (bladder spasm), Disp: 15 tablet, Rfl: 0    tamsulosin (FLOMAX) 0.4 mg, Take 1 capsule (0.4 mg total) by mouth daily with dinner for 7 days, Disp: 7 capsule, Rfl: 0    tamsulosin (FLOMAX) 0.4 mg, Take 1 capsule (0.4 mg total) by mouth daily with dinner for 7 days, Disp: 7 capsule, Rfl: 0    Current Facility-Administered Medications:     Botulinum Toxin Type A SOLR 200 Units, 200 Units, Injection, Q3 Months, CAMI Fatima, 200 Units at 12/28/23 1326    Current Allergies     Allergies as of 09/30/2024 - Reviewed 09/30/2024   Allergen Reaction Noted    Levofloxacin  Anaphylaxis and Swelling 11/11/2016            The following portions of the patient's history were reviewed and updated as appropriate: allergies, current medications, past family history, past medical history, past social history, past surgical history and problem list.     Past Medical History:   Diagnosis Date    Anxiety     Back pain     Cervicalgia     disc displacement,radiculopathy    CTS (carpal tunnel syndrome)     Depression     Disease of thyroid gland     Headache(784.0)     Headache, tension-type     Herniated cervical disc     HL (hearing loss)     Hypertension     last assessed: 8/2/2016    Kidney stone 3/2021    Migraine     Myofascial pain syndrome     Neck pain     Wears reading eyeglasses        Past Surgical History:   Procedure Laterality Date    BREAST BIOPSY Right     biospy w/ marker    CARPAL TUNNEL RELEASE Right     COLONOSCOPY      FL RETROGRADE PYELOGRAM  8/8/2024    GANGLION CYST EXCISION Left 08/03/2017    Procedure: WRIST/HAND MASS EXCISION;  Surgeon: Eron Dai MD;  Location:  MAIN OR;  Service: Orthopedics    GASTRECTOMY SLEEVE LAPAROSCOPIC      HYSTERECTOMY      DC CYSTO/URETERO W/LITHOTRIPSY &INDWELL STENT INSRT Left 8/8/2024    Procedure: CYSTOSCOPY URETEROSCOPY WITH LITHOTRIPSY HOLMIUM LASER, RETROGRADE PYELOGRAM AND INSERTION STENT URETERAL;  Surgeon: Kamari Terry MD;  Location:  MAIN OR;  Service: Urology       Family History   Problem Relation Age of Onset    Hypertension Mother     Other Mother         Bladder surgery    Obesity Mother     Cancer Mother 58        abdominal cancer    Colon cancer Mother     Diabetes Father     Hypertension Father     Heart disease Father     Obesity Father     Leukemia Sister     Obesity Sister     No Known Problems Maternal Aunt     No Known Problems Maternal Aunt     Cancer Maternal Grandmother 70        Bladder    Dementia Maternal Grandfather     Leukemia Paternal Grandmother     No Known Problems Paternal  Grandfather     No Known Problems Son     No Known Problems Son     Stroke Neg Hx          Medications have been verified.        Objective   /84   Pulse 58   Temp 98.4 °F (36.9 °C)   Resp 20   LMP 11/18/2015   SpO2 100%   Patient's last menstrual period was 11/18/2015.       Physical Exam     Physical Exam  Constitutional:       General: She is not in acute distress.     Appearance: She is not ill-appearing or toxic-appearing.   HENT:      Head: Normocephalic and atraumatic.      Right Ear: Tympanic membrane and external ear normal.      Left Ear: Tympanic membrane and external ear normal.      Nose: Congestion present.      Comments: Boggy nasal turbinates     Mouth/Throat:      Pharynx: Posterior oropharyngeal erythema present.      Comments: Cobblestone appearance in posterior pharynx  Cardiovascular:      Rate and Rhythm: Normal rate and regular rhythm.   Pulmonary:      Effort: Pulmonary effort is normal. No respiratory distress.      Breath sounds: No wheezing.   Neurological:      General: No focal deficit present.      Mental Status: She is alert and oriented to person, place, and time.   Psychiatric:         Mood and Affect: Mood normal.         Behavior: Behavior normal.         Thought Content: Thought content normal.         Judgment: Judgment normal.               Molly Robin DO

## 2024-10-12 DIAGNOSIS — G47.00 INSOMNIA, UNSPECIFIED TYPE: ICD-10-CM

## 2024-10-12 DIAGNOSIS — G43.809 OTHER MIGRAINE WITHOUT STATUS MIGRAINOSUS, NOT INTRACTABLE: ICD-10-CM

## 2024-10-14 RX ORDER — ZOLPIDEM TARTRATE 6.25 MG/1
6.25 TABLET, FILM COATED, EXTENDED RELEASE ORAL
Qty: 30 TABLET | Refills: 0 | Status: SHIPPED | OUTPATIENT
Start: 2024-10-14

## 2024-10-14 RX ORDER — BUTALBITAL, ACETAMINOPHEN AND CAFFEINE 50; 325; 40 MG/1; MG/1; MG/1
1 TABLET ORAL EVERY 4 HOURS PRN
Qty: 30 TABLET | Refills: 0 | Status: SHIPPED | OUTPATIENT
Start: 2024-10-14

## 2024-10-14 NOTE — TELEPHONE ENCOUNTER
1 [id number] 08/26/2024 08/26/2024 Zolpidem Tartrate (Tablet, Extended Release) 30.0 30 6.25 MG NA OLIVER CHANEL [facility] Commercial Insurance 0 / 0 [place]    1 [id number] 07/29/2024 07/29/2024 oxyCODONE HCL (Tablet) 12.0 3 5 MG 30.0 FLORY KASH [facility] Commercial Insurance 0 / 0 [place]    1 [id number] 07/07/2024 07/03/2024 Butalbital-acetaminophen-caffeine (Tablet) 30.0 5 325 MG-50 MG-40 MG NA ALBERTO SHETH POGODZINSKI [facility] Commercial Insurance 0 / 0 [place]    2 [id number] 07/03/2024 07/03/2024 Zolpidem Tartrate (Tablet, Extended Release) 30.0 30 6.25 MG [facility] Commercial Insurance 0 / 0 [place]    2 [id number] 05/14/2024 05/14/2024 Zolpidem Tartrate (Tablet, Extended Release) 30.0 30 6.25 MG NA OLIVER CHANEL [facility] Commercial Insurance 0 / 0 [place]    1 [id number] 05/08/2024 05/03/2024 Butalbital-acetaminophen-caffeine (Tablet) 30.0 5 325 MG-50 MG-40 MG NA OLIVER CHANEL [facility] Commercial Insurance 0 / 0 [place]    2 [id number] 03/18/2024 03/18/2024 Zolpidem Tartrate (Tablet, Extended Release) 30.0 30 6.25 MG NA LOVELY GRAHAM [facility] Commercial Insurance 0 / 0 [place]    1 [id number] 01/24/2024 01/22/2024 Butalbital-acetaminophen-caffeine (Tablet) 30.0 5 325 MG-50 MG-40 MG NA OLIVER CHANEL [facility] Commercial Insurance 0 / 0 [place]    2 [id number] 01/22/2024 01/22/2024 Zolpidem Tartrate (Tablet, Extended Release) 30.0 30 6.25 [facility] Commercial Insurance 0 / 0 [place]    1 [id number] 11/27/2023 11/27/2023 Zolpidem Tartrate (Tablet, Extended Release) 30.0 30 6.25 MG NA [facility] Commercial Insurance 0 / 0 [place]

## 2024-10-14 NOTE — TELEPHONE ENCOUNTER
1 [id number] 08/26/2024 08/26/2024 Zolpidem Tartrate (Tablet, Extended Release) 30.0 30 6.25 MG NA OLIVER CHANEL [facility] Commercial Insurance 0 / 0 [place]    1 [id number] 07/29/2024 07/29/2024 oxyCODONE HCL (Tablet) 12.0 3 5 MG 30.0 FLORY KASH [facility] Commercial Insurance 0 / 0 PA    1 [id number] 07/07/2024 07/03/2024 Butalbital-acetaminophen-caffeine (Tablet) 30.0 5 325 MG-50 MG-40 MG NA ALBERTO SHETH POGODZINSKI [facility] Commercial Insurance 0 / 0 [place]    2 [id number] 07/03/2024 07/03/2024 Zolpidem Tartrate (Tablet, Extended Release) 30.0 30 6.25 MG [facility] Commercial Insurance 0 / 0 PA    2 [id number] 05/14/2024 05/14/2024 Zolpidem Tartrate (Tablet, Extended Release) 30.0 30 6.25 MG NA OLIVER CHANEL [facility] Commercial Insurance 0 / 0 PA    1 [id number] 05/08/2024 05/03/2024 Butalbital-acetaminophen-caffeine (Tablet) 30.0 5 325 MG-50 MG-40 MG NA OLIVER CHANEL [facility] Commercial Insurance 0 / 0 [place]    2 [id number] 03/18/2024 03/18/2024 Zolpidem Tartrate (Tablet, Extended Release) 30.0 30 6.25 MG NA LOVELY GRAHAM [facility] Commercial Insurance 0 / 0 PA    1 [id number] 01/24/2024 01/22/2024 Butalbital-acetaminophen-caffeine (Tablet) 30.0 5 325 MG-50 MG-40 MG NA OLIVER CHANEL [facility] Commercial Insurance 0 / 0 [place]    2 [id number] 01/22/2024 01/22/2024 Zolpidem Tartrate (Tablet, Extended Release) 30.0 30 6.25 [facility] Commercial Insurance 0 / 0 [place]    1 [id number] 11/27/2023 11/27/2023 Zolpidem Tartrate (Tablet, Extended Release) 30.0 30 6.25 MG NA [facility] Commercial Insurance 0 / 0 [place]

## 2024-10-22 ENCOUNTER — HOSPITAL ENCOUNTER (OUTPATIENT)
Facility: HOSPITAL | Age: 54
Discharge: HOME | End: 2024-10-22
Payer: COMMERCIAL

## 2024-10-22 DIAGNOSIS — Z12.31 ENCOUNTER FOR SCREENING MAMMOGRAM FOR MALIGNANT NEOPLASM OF BREAST: ICD-10-CM

## 2024-10-22 PROCEDURE — 77067 SCR MAMMO BI INCL CAD: CPT

## 2024-10-22 PROCEDURE — 77063 BREAST TOMOSYNTHESIS BI: CPT

## 2024-11-28 DIAGNOSIS — I10 ESSENTIAL HYPERTENSION: ICD-10-CM

## 2024-11-28 DIAGNOSIS — F41.9 ANXIETY: ICD-10-CM

## 2024-11-29 ENCOUNTER — HOSPITAL ENCOUNTER (OUTPATIENT)
Dept: ULTRASOUND IMAGING | Facility: HOSPITAL | Age: 54
Discharge: HOME | End: 2024-11-29
Payer: COMMERCIAL

## 2024-11-29 DIAGNOSIS — N20.1 LEFT URETERAL STONE: ICD-10-CM

## 2024-11-29 PROCEDURE — 76775 US EXAM ABDO BACK WALL LIM: CPT

## 2024-11-29 RX ORDER — ESCITALOPRAM OXALATE 20 MG/1
20 TABLET ORAL DAILY
Qty: 90 TABLET | Refills: 3 | Status: SHIPPED | OUTPATIENT
Start: 2024-11-29

## 2024-11-29 RX ORDER — LISINOPRIL 10 MG/1
10 TABLET ORAL DAILY
Qty: 90 TABLET | Refills: 3 | Status: SHIPPED | OUTPATIENT
Start: 2024-11-29

## 2024-12-01 DIAGNOSIS — G43.809 OTHER MIGRAINE WITHOUT STATUS MIGRAINOSUS, NOT INTRACTABLE: ICD-10-CM

## 2024-12-01 DIAGNOSIS — G47.00 INSOMNIA, UNSPECIFIED TYPE: ICD-10-CM

## 2024-12-03 RX ORDER — ZOLPIDEM TARTRATE 6.25 MG/1
6.25 TABLET, FILM COATED, EXTENDED RELEASE ORAL
Qty: 30 TABLET | Refills: 0 | Status: SHIPPED | OUTPATIENT
Start: 2024-12-03 | End: 2024-12-07 | Stop reason: SDUPTHER

## 2024-12-03 RX ORDER — BUTALBITAL, ACETAMINOPHEN AND CAFFEINE 50; 325; 40 MG/1; MG/1; MG/1
1 TABLET ORAL EVERY 4 HOURS PRN
Qty: 30 TABLET | Refills: 0 | Status: SHIPPED | OUTPATIENT
Start: 2024-12-03

## 2024-12-03 NOTE — TELEPHONE ENCOUNTER
1 [id number] 10/17/2024 10/14/2024 Butalbital-acetaminophen-caffeine (Tablet) 30.0 5 325 MG-50 MG-40 MG NA OLIVER [facility] Commercial Insurance 0 / 0 [place]    1 [id number] 10/14/2024 10/14/2024 Zolpidem Tartrate (Tablet, Extended Release) 30.0 30 6.25 MG NA [facility] Commercial Insurance 0 / 0 [place]    1 [id number] 08/26/2024 08/26/2024 Zolpidem Tartrate (Tablet, Extended Release) 30.0 30 6.25 MG NA [facility] Commercial Insurance 0 / 0 PA    1 [id number] 07/29/2024 07/29/2024 oxyCODONE HCL (Tablet) 12.0 3 5 MG 30.0 FLORY KASH [facility] Commercial Insurance 0 / 0 [place]    1 [id number] 07/07/2024 07/03/2024 Butalbital-acetaminophen-caffeine (Tablet) 30.0 5 325 MG-50 MG-40 MG NA ALBERTO SHETH POGODZINSKI [facility] Commercial Insurance 0 / 0 PA    2 [id number] 07/03/2024 07/03/2024 Zolpidem Tartrate (Tablet, Extended Release) 30.0 30 6.25 MG NA JINNY [facility] Commercial Insurance 0 / 0 [place]    2 [id number] 05/14/2024 05/14/2024 Zolpidem Tartrate (Tablet, Extended Release) 30.0 30 6.25 MG NA [facility] Commercial Insurance 0 / 0 PA    1 [id number] 05/08/2024 05/03/2024 Butalbital-acetaminophen-caffeine (Tablet) 30.0 5 325 MG-50 MG-40 MG [facility] [place] Commercial Insurance 0 / 0 PA    2 [id number] 03/18/2024 03/18/2024 Zolpidem Tartrate (Tablet, Extended Release) 30.0 30 6.25 MG NA LOVELY GRAHAM [facility] Commercial Insurance 0 / 0 PA    1 [id number] 01/24/2024 01/22/2024 Butalbital-acetaminophen-caffeine (Tablet) 30.0 5 325 MG-50 MG-40 MG NA OLIVER CHANEL [facility] Commercial Insurance 0 / 0 [place]

## 2024-12-04 ENCOUNTER — TELEPHONE (OUTPATIENT)
Dept: NEUROLOGY | Facility: CLINIC | Age: 54
End: 2024-12-04

## 2024-12-04 NOTE — TELEPHONE ENCOUNTER
Botox number of units: 200  Botox quantity: 1  Arrived at what location: CV  Botox at Correct Administering Location: yes  NDC number: 2736-3703-03  Lot number: [id number]  Expiration Date: 9/30/26  Appt notes indicate correct medication: yes

## 2024-12-07 DIAGNOSIS — G47.00 INSOMNIA, UNSPECIFIED TYPE: ICD-10-CM

## 2024-12-09 RX ORDER — ZOLPIDEM TARTRATE 6.25 MG/1
6.25 TABLET, FILM COATED, EXTENDED RELEASE ORAL
Qty: 30 TABLET | Refills: 0 | Status: SHIPPED | OUTPATIENT
Start: 2024-12-09

## 2024-12-09 NOTE — TELEPHONE ENCOUNTER
Pt requesting different pharmacy   1 [id number] 12/04/2024 12/03/2024 Butalbital-acetaminophen-caffeine (Tablet) 30.0 5 325 MG-50 MG-40 MG NA OLIVER [facility] [employer] [facility] Commercial Insurance 0 / 0 [place]    1 [id number] 12/04/2024 12/03/2024 Zolpidem Tartrate (Tablet, Extended Release) 30.0 30 6.25 MG NA OLIVER [facility] [employer] [facility] Commercial Insurance 0 / 0 PA    1 [id number] 10/17/2024 10/14/2024 Butalbital-acetaminophen-caffeine (Tablet) 30.0 5 325 MG-50 MG-40 MG NA OLIVER [facility] [employer] [facility] [facility] Commercial Insurance 0 / 0 PA    1 [id number] 10/14/2024 10/14/2024 Zolpidem Tartrate (Tablet, Extended Release) 30.0 30 6.25 MG NA OLIVER [facility] Commercial Insurance 0 / 0 PA    1 [id number] 08/26/2024 08/26/2024 Zolpidem Tartrate (Tablet, Extended Release) 30.0 30 6.25 MG NA OLIVER [facility] Commercial Insurance 0 / 0 PA    1 [id number] 07/29/2024 07/29/2024 oxyCODONE HCL (Tablet) 12.0 3 5 MG 30.0 FLORY KASH [facility] Commercial Insurance 0 / 0 PA    1 [id number] 07/07/2024 07/03/2024 Butalbital-acetaminophen-caffeine (Tablet) 30.0 5 325 MG-50 MG-40 MG NA ALBERTO SHETH POGODZINSKI [facility] [facility] Commercial Insurance 0 / 0 PA    2 [id number] 07/03/2024 07/03/2024 Zolpidem Tartrate (Tablet, Extended Release) 30.0 30 6.25 MG NA JINNY [facility] Commercial Insurance 0 / 0 PA    2 [id number] 05/14/2024 05/14/2024 Zolpidem Tartrate (Tablet, Extended Release) 30.0 30 6.25 MG NA OLIVER BROADT [facility] Commercial Insurance 0 / 0 PA    1 [id number] 05/08/2024 05/03/2024 Butalbital-acetaminophen-caffeine (Tablet) 30.0 5 325 MG-50 MG-40 MG NA OLIVER CHANEL [facility] Commercial Insurance 0 / 0 PA

## 2024-12-17 ENCOUNTER — PROCEDURE VISIT (OUTPATIENT)
Dept: NEUROLOGY | Facility: CLINIC | Age: 54
End: 2024-12-17
Payer: COMMERCIAL

## 2024-12-17 VITALS — TEMPERATURE: 98.3 F | HEART RATE: 81 BPM | DIASTOLIC BLOOD PRESSURE: 79 MMHG | SYSTOLIC BLOOD PRESSURE: 120 MMHG

## 2024-12-17 DIAGNOSIS — G43.709 CHRONIC MIGRAINE WITHOUT AURA WITHOUT STATUS MIGRAINOSUS, NOT INTRACTABLE: Primary | ICD-10-CM

## 2024-12-17 PROCEDURE — 64615 CHEMODENERV MUSC MIGRAINE: CPT | Performed by: PHYSICIAN ASSISTANT

## 2024-12-17 NOTE — PROGRESS NOTES
"Universal Protocol   procedure performed by consultantConsent: Verbal consent obtained. Written consent obtained.  Risks and benefits: risks, benefits and alternatives were discussed  Consent given by: patient  Time out: Immediately prior to procedure a \"time out\" was called to verify the correct patient, procedure, equipment, support staff and site/side marked as required.  Patient understanding: patient states understanding of the procedure being performed  Patient consent: the patient's understanding of the procedure matches consent given  Procedure consent: procedure consent matches procedure scheduled  Relevant documents: relevant documents present and verified  Patient identity confirmed: verbally with patient      Chemodenervation     Date/Time  12/17/2024 8:30 AM     Performed by  Rosmery Geronimo PA-C   Authorized by  Rosmery Geronimo PA-C     Pre-procedure details      Prepped With: Alcohol     Anesthesia  (see MAR for exact dosages):     Anesthesia method:  None   Procedure details      Position:  Upright   Botox      Botox Type:  Type A    Brand:  Botox    mL's of Botulinum Toxin:  200    Final Concentration per CC:  50 units    Needle Gauge:  30 G 2.5 inch   Procedures      Botox Procedures: chronic headache      Indications: migraines     Injection Location      Head / Face:  L superior cervical paraspinal, R superior cervical paraspinal, L , R , L frontalis, R frontalis, L medial occipitalis, R medial occipitalis, procerus, R temporalis, L temporalis, R superior trapezius and L superior trapezius    L  injection amount:  5 unit(s)    R  injection amount:  5 unit(s)    L lateral frontalis:  5 unit(s)    R lateral frontalis:  5 unit(s)    L medial frontalis:  5 unit(s)    R medial frontalis:  5 unit(s)    L temporalis injection amount:  20 unit(s)    R temporalis injection amount:  20 unit(s)    Procerus injection amount:  5 unit(s)    L medial occipitalis injection " amount:  15 unit(s)    R medial occipitalis injection amount:  15 unit(s)    L superior cervical paraspinal injection amount:  10 unit(s)    R superior cervical paraspinal injection amount:  10 unit(s)    L superior trapezius injection amount:  15 unit(s)    R superior trapezius injection amount:  15 unit(s)   Total Units      Total units used:  200    Total units discarded:  0   Post-procedure details      Chemodenervation:  Chronic migraine    Facial Nerve Location::  Bilateral facial nerve    Patient tolerance of procedure:  Tolerated well, no immediate complications   Comments       10 units temporalis bilaterally  5 units lower temporalis bilaterally  15 units occipitalis/cervical paraspinal  All medically necessary

## 2024-12-30 DIAGNOSIS — G43.109 MIGRAINE WITH AURA AND WITHOUT STATUS MIGRAINOSUS, NOT INTRACTABLE: ICD-10-CM

## 2025-01-17 ENCOUNTER — TELEMEDICINE (OUTPATIENT)
Dept: NEUROLOGY | Facility: CLINIC | Age: 55
End: 2025-01-17
Payer: COMMERCIAL

## 2025-01-17 DIAGNOSIS — F41.9 ANXIETY: ICD-10-CM

## 2025-01-17 DIAGNOSIS — G43.709 CHRONIC MIGRAINE WITHOUT AURA WITHOUT STATUS MIGRAINOSUS, NOT INTRACTABLE: Primary | ICD-10-CM

## 2025-01-17 PROCEDURE — 99213 OFFICE O/P EST LOW 20 MIN: CPT | Performed by: PHYSICIAN ASSISTANT

## 2025-01-17 NOTE — ASSESSMENT & PLAN NOTE
Currently dealing with increased stress.  Did recommend seeing a counselor to help decrease her anxiety and help her sleep better

## 2025-01-17 NOTE — PROGRESS NOTES
Virtual Regular Visit  Name: Rosa Elena Piper      : 1970      MRN: [id number]  Encounter Provider: Rosmery Geronimo PA-C  Encounter Date: 2025   Encounter department: NEUROLOGY [facility]      Verification of patient location:  Patient is located at Home in the following state in which I hold an active license [place] :  Assessment & Plan  Chronic migraine without aura without status migrainosus, not intractable  Preventative  Continue Zonisamide 100 mg at bedtime  Continue Magnesium, Vit D, Vit B2, CoQ10  Emgality 1 injection every 28 days  Continue Botox every 3 months    Abortive  At onset of migraine, take ubrelvy 100 mg.  May repeat in 2 hours if needed.  LImit of 200 mg in 24 hours  May use your fioricet if above fails    If unable to break migraine in 24 hours, call us       Anxiety  Currently dealing with increased stress.  Did recommend seeing a counselor to help decrease her anxiety and help her sleep better           Encounter provider Rosmery Geronimo PA-C    The patient was identified by name and date of birth. Rosa Elena Piper was informed that this is a telemedicine visit and that the visit is being conducted through the Epic Embedded platform. She agrees to proceed..  My office door was closed. No one else was in the room.  She acknowledged consent and understanding of privacy and security of the video platform. The patient has agreed to participate and understands they can discontinue the visit at any time.    Patient is aware this is a billable service.     History of Present Illness     HPI  Patient is a principal at [employer].       Current medical illnesses:  QTc 2020 426 ms     What medications do you take or have you taken for your headaches?   Current preventive:   Vit D, CoQ10, Magnesium, B2  Lexapro  Zonisamide  Emgality  Lisinopril  Botox     Current Abortive:   Fioricet  Ubrelvy     Prior Preventive:   Topamax, gabapentin  Amrix, Tizanidine,   Benacar,  Amlodipine  Olanzapine,Amitriptyline,  Cyclobenzaprine     Prior Abortive:   Rizatriptan, Imitrex,   Toradol,   Dexamethasone,   rizatriptan,   olanzapine,   ondansetron, Reglan, Prochloraperzine   Nurtec  Trudhesa     Interval update as of 4/26/2024:  Patient reports does very well when she is able to receive her Emgality and botox in a timely manner.  When she misses or is late then her headaches worsen     Interval updates as of 3/9/2021:  Patient states that she did have some pain in her scalp after the last Botox injections. She did have her hair colored a few days after the injections.  Patient states that she doesn't feel her headaches are optimally controlled at this point.  Still has migraines which are severe and intense.     What is your current pain level ?  8/10     How often do the headaches occur?   TTH: 1 times a week; was daily before BOTOX  Migraines: 1 a month with BOTOX and emgality, prior was daily headaches with 16+ migraine days a month     Are you ever headache free? yes     Headache history with updates:  Alternative therapies used in the past for headaches? Massage, chiropractor  Headache are worse if the patient: cough, sneeze, bending over, Exertion  Headache triggers:  Fatigue, Stress/tension, lack of food, heat     Aura/warning and how long does it last ? yes:  scatoma in her vision once a month 20 minutes but no migraine pain       What time of the day do the headaches start? mid day     How long do the headaches last?   TTH: rest of the day  Migraines: within a few hours; prior  1 day to 3 days     Describe your usual headache ?  TTH: pressure, throbbing  Migraines: pressure, throbbing, increase in pain, stabbing     Where is your headache located?    Right > left Frontal, Occipital and Retro-orbital      What is the intensity of pain?   TTH: 4-7/10  Migrianes: 8/10     Associated symptoms:   Decrease of appetite, nausea, vomiting,   Photophobia, phonophobia, sensitivity to smell    Problem with concentration  Decreased social functioning  Tinnitus, light-headed or dizzy, stiff or sore neck,   prefer to be alone and in a dark room, unable to work     Number of days missed per month because of headaches:  Work (or school) days: would miss 1 a month if didn't push through  Social or Family activities: 1 a month     What time of the year do headaches occur more frequently? 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 Yes, PCP  Have you had trigger point injection performed and how often? No  Have you had Botox injection performed and how often? Yes   Have you had epidural injections or transforaminal injections performed? Yes     Have you used CBD or THC for your headaches and how often? Yes, tried CBD oil but didn't help     Are you current pregnant or planning on getting pregnant? No, hysterectomy     Have you ever had any Brain imaging? yes      2013 CT Head  Normal     12/5/2020 MRI C spine  Cervical degenerative disc disease with disc herniations and endplate/uncinate joint hypertrophic changes C4-5, C5-6 and C6-7.  Stable canal stenosis and foraminal narrowing.     At C2-3 and C3-4 there is right facet hypertrophic degenerative change resulting in foraminal narrowing.  These changes have progressed since the prior examination with worsening right-sided foraminal narrowing at these levels.     5/18/2023 MRI C-spine  Multilevel spondylotic changes are similar to the prior study, most pronounced at C5-C6. No cord signal abnormality.    I personally reviewed these images.     Reviewed old notes from physician seen in the past- see above HPI for summary of previous encounters.       With botox has had a reduction of at least 7 migraine days with less abortive medication, less ER visits which correlates to headache diary        Review of Systems   Constitutional: Negative.    HENT: Negative.     Eyes: Negative.    Respiratory: Negative.      Cardiovascular: Negative.    Gastrointestinal: Negative.    Endocrine: Negative.    Genitourinary: Negative.    Musculoskeletal: Negative.    Skin: Negative.    Allergic/Immunologic: Negative.    Neurological:  Positive for headaches.   Hematological: Negative.    Psychiatric/Behavioral: Negative.     I personally reviewed and updated the ROS that was entered by the medical assistant      Objective   LMP 11/18/2015     Physical Exam  CONSTITUTIONAL: Well developed, well nourished, well groomed. No dysmorphic features.     HEENT:  Normocephalic atraumatic.    Chest:  Respirations regular and unlabored.    Psychiatric:  Normal behavior and appropriate affect      MENTAL STATUS  Orientation: Alert and oriented x 3  Fund of knowledge: Intact.    Visit Time  I have spent a total time of 20 minutes in caring for this patient on the day of the visit/encounter including Prognosis, Risks and benefits of tx options, Risk factor reductions, Impressions, Counseling / Coordination of care, Documenting in the medical record, Reviewing / ordering tests, medicine, procedures  , and Obtaining or reviewing history  .

## 2025-01-17 NOTE — ASSESSMENT & PLAN NOTE
Preventative  Continue Zonisamide 100 mg at bedtime  Continue Magnesium, Vit D, Vit B2, CoQ10  Emgality 1 injection every 28 days  Continue Botox every 3 months    Abortive  At onset of migraine, take ubrelvy 100 mg.  May repeat in 2 hours if needed.  LImit of 200 mg in 24 hours  May use your fioricet if above fails    If unable to break migraine in 24 hours, call us

## 2025-01-17 NOTE — PROGRESS NOTES
Patient is a principal at [employer].       Current medical illnesses:  QTc 8/17/2020 426 ms     What medications do you take or have you taken for your headaches?   Current preventive:   Vit D, CoQ10, Magnesium, B2  Lexapro  Zonisamide  Emgality  Lisinopril  Botox     Current Abortive:   Fioricet  ***Trudhesa  Ubrelvy     Prior Preventive:   Topamax, gabapentin  Amrix, Tizanidine,   Benacar, Amlodipine  Olanzapine,Amitriptyline,  Cyclobenzaprine     Prior Abortive:   Rizatriptan, Imitrex,   Toradol,   Dexamethasone,   rizatriptan,   olanzapine,   ondansetron, Reglan, Prochloraperzine   Nurtec     Interval update as of 4/26/2024:  Patient reports does very well when she is able to receive her Emgality and botox in a timely manner.  When she misses or is late then her headaches worsen     Interval updates as of 3/9/2021:  Patient states that she did have some pain in her scalp after the last Botox injections. She did have her hair colored a few days after the injections.  Patient states that she doesn't feel her headaches are optimally controlled at this point.  Still has migraines which are severe and intense.     What is your current pain level ?  5/10     How often do the headaches occur?   TTH: 1 times a week; was daily before BOTOX  Migraines: 1 a month with BOTOX and emgality, prior was daily headaches with 16+ migraine days a month     Are you ever headache free? yes     Headache history with updates:  Alternative therapies used in the past for headaches? Massage, chiropractor  Headache are worse if the patient: cough, sneeze, bending over, Exertion  Headache triggers:  Fatigue, Stress/tension, lack of food, heat     Aura/warning and how long does it last ? yes:  scatoma in her vision once a month 20 minutes but no migraine pain       What time of the day do the headaches start? mid day     How long do the headaches last?   TTH: rest of the day  Migraines: 1 day to 3 days     Describe your usual headache ?  TTH:  pressure, throbbing  Migraines: pressure, throbbing, increase in pain, stabbing     Where is your headache located?    Right > left Frontal, Occipital and Retro-orbital      What is the intensity of pain?   TTH: 4-7/10  Migrianes: 8/10     Associated symptoms:   Decrease of appetite, nausea, vomiting,   Photophobia, phonophobia, sensitivity to smell   Problem with concentration  Decreased social functioning  Tinnitus, light-headed or dizzy, stiff or sore neck,   prefer to be alone and in a dark room, unable to work     Number of days missed per month because of headaches:  Work (or school) days: would miss 1 a month if didn't push through  Social or Family activities: 1 a month     What time of the year do headaches occur more frequently? 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 Yes, PCP  Have you had trigger point injection performed and how often? No  Have you had Botox injection performed and how often? Yes   Have you had epidural injections or transforaminal injections performed? Yes     Have you used CBD or THC for your headaches and how often? Yes, tried CBD oil but didn't help     Are you current pregnant or planning on getting pregnant? No, hysterectomy     Have you ever had any Brain imaging? yes      2013 CT Head  Normal     12/5/2020 MRI C spine  Cervical degenerative disc disease with disc herniations and endplate/uncinate joint hypertrophic changes C4-5, C5-6 and C6-7.  Stable canal stenosis and foraminal narrowing.     At C2-3 and C3-4 there is right facet hypertrophic degenerative change resulting in foraminal narrowing.  These changes have progressed since the prior examination with worsening right-sided foraminal narrowing at these levels.     5/18/2023 MRI C-spine  Multilevel spondylotic changes are similar to the prior study, most pronounced at C5-C6. No cord signal abnormality.    I personally reviewed these images.     Reviewed old  notes from physician seen in the past- see above HPI for summary of previous encounters.       With botox has had a reduction of at least 7 migraine days with less abortive medication, less ER visits which correlates to headache diary

## 2025-02-11 DIAGNOSIS — E03.9 HYPOTHYROIDISM, UNSPECIFIED TYPE: ICD-10-CM

## 2025-02-11 RX ORDER — LEVOTHYROXINE SODIUM 25 UG/1
25 TABLET ORAL DAILY
Qty: 90 TABLET | Refills: 3 | Status: SHIPPED | OUTPATIENT
Start: 2025-02-11

## 2025-02-12 DIAGNOSIS — G43.809 OTHER MIGRAINE WITHOUT STATUS MIGRAINOSUS, NOT INTRACTABLE: ICD-10-CM

## 2025-02-13 RX ORDER — BUTALBITAL, ACETAMINOPHEN AND CAFFEINE 50; 325; 40 MG/1; MG/1; MG/1
1 TABLET ORAL EVERY 4 HOURS PRN
Qty: 30 TABLET | Refills: 0 | Status: SHIPPED | OUTPATIENT
Start: 2025-02-13

## 2025-02-13 NOTE — TELEPHONE ENCOUNTER
1 [id number] 12/26/2024 12/09/2024 Zolpidem Tartrate (Tablet, Extended Release) 30.0 30 6.25 MG NA OLIVER [facility] Commercial [place] 0 / 0 [place]    1 [id number] 12/04/2024 12/03/2024 Butalbital-acetaminophen-caffeine (Tablet) 30.0 5 325 MG-50 MG-40 MG NA OLIVER [facility] [employer] [place] / 0 [place]    1 [id number] 12/04/2024 12/03/2024 Zolpidem Tartrate (Tablet, Extended Release) 30.0 30 6.25 MG NA OLIVER [facility] Commercial Insurance 0 / 0 [place]    1 [id number] 10/17/2024 10/14/2024 Butalbital-acetaminophen-caffeine (Tablet) 30.0 5 325 MG-50 MG-40 MG NA OLIVER [facility] [employer] [facility] Commercial [place] / 0 [place]    1 [id number] 10/14/2024 10/14/2024 Zolpidem Tartrate (Tablet, Extended Release) 30.0 30 6.25 MG NA [facility] Commercial Insurance 0 / 0 [place]    1 [id number] 08/26/2024 08/26/2024 Zolpidem Tartrate (Tablet, Extended Release) 30.0 30 6.25 MG [facility] 0 / 0 [place]    1 [id number] 07/29/2024 07/29/2024 oxyCODONE HCL (Tablet) 12.0 3 5 MG 30.0 FLORY TADTANI [facility] Commercial Insurance 0 / 0 [place]    1 [id number] 07/07/2024 07/03/2024 Butalbital-acetaminophen-caffeine (Tablet) 30.0 5 325 MG-50 MG-40 MG NA ALBERTO SHETH POGODZINSKI [employer] [facility] Commercial Insurance 0 / 0 [place]    2 [id number] 07/03/2024 07/03/2024 Zolpidem Tartrate (Tablet, Extended Release) 30.0 30 6.25 MG NA ELSPETH BLACK-ROWE [facility] Commercial Insurance 0 / 0 [place]    2 [id number] 05/14/2024 05/14/2024 Zolpidem Tartrate (Tablet, Extended Release) 30.0 30 6.25 MG NA OLIVER CHANEL [facility] Commercial Insurance 0 / 0 [place]

## 2025-02-25 DIAGNOSIS — G47.00 INSOMNIA, UNSPECIFIED TYPE: ICD-10-CM

## 2025-02-26 RX ORDER — ZOLPIDEM TARTRATE 6.25 MG/1
6.25 TABLET, FILM COATED, EXTENDED RELEASE ORAL
Qty: 30 TABLET | Refills: 0 | Status: SHIPPED | OUTPATIENT
Start: 2025-02-26 | End: 2025-03-04 | Stop reason: SDUPTHER

## 2025-02-26 NOTE — TELEPHONE ENCOUNTER
1 [id number] 02/13/2025 02/13/2025 Butalbital-acetaminophen-caffeine (Tablet) 30.0 5 325 MG-50 MG-40 MG NA OLIVER [employer] Commercial Insurance 0 / 0 [place]    1 [id number] 12/26/2024 12/09/2024 Zolpidem Tartrate (Tablet, Extended Release) 30.0 30 6.25 MG NA OLIVER [facility] Commercial Insurance 0 / 0 [place]    1 [id number] 12/04/2024 12/03/2024 Butalbital-acetaminophen-caffeine (Tablet) 30.0 5 325 MG-50 MG-40 MG NA OLIVER [employer] [facility] Commercial Insurance 0 / 0 [place]    1 [id number] 12/04/2024 12/03/2024 Zolpidem Tartrate (Tablet, Extended Release) 30.0 30 6.25 MG NA OLIVER [employer] Commercial Insurance 0 / 0 [place]    1 [id number] 10/17/2024 10/14/2024 Butalbital-acetaminophen-caffeine (Tablet) 30.0 5 325 MG-50 MG-40 MG NA OLIVER [employer] [facility] Commercial Insurance 0 / 0 [place]    1 [id number] 10/14/2024 10/14/2024 Zolpidem Tartrate (Tablet, Extended Release) 30.0 30 6.25 MG NA OLIVER [facility] Commercial Insurance 0 / 0 [place]    1 [id number] 08/26/2024 08/26/2024 Zolpidem Tartrate (Tablet, Extended Release) 30.0 30 6.25 MG NA OLIVER [facility] Commercial Insurance 0 / 0 [place]    1 [id number] 07/29/2024 07/29/2024 oxyCODONE HCL (Tablet) 12.0 3 5 MG 30.0 FLORY TADEVOSMOY [facility] Commercial Insurance 0 / 0 [place]    1 [id number] 07/07/2024 07/03/2024 Butalbital-acetaminophen-caffeine (Tablet) 30.0 5 325 MG-50 MG-40 MG NA ALBERTO SHETH POGODZINSKI [facility] Commercial Insurance 0 / 0 [place]    2 [id number] 07/03/2024 07/03/2024 Zolpidem Tartrate (Tablet, Extended Release) 30.0 30 6.25 MG NA [facility] Commercial Insurance 0 / 0 [place]

## 2025-03-04 DIAGNOSIS — G47.00 INSOMNIA, UNSPECIFIED TYPE: ICD-10-CM

## 2025-03-05 ENCOUNTER — TELEPHONE (OUTPATIENT)
Age: 55
End: 2025-03-05

## 2025-03-05 RX ORDER — ZOLPIDEM TARTRATE 6.25 MG/1
6.25 TABLET, FILM COATED, EXTENDED RELEASE ORAL
Qty: 30 TABLET | Refills: 0 | Status: SHIPPED | OUTPATIENT
Start: 2025-03-05

## 2025-03-05 NOTE — TELEPHONE ENCOUNTER
Patient called stating she received a message yesterday that was unclear regarding Botox medication. Informed patient no notes are on the chart regarding a call yesterday. Patient is asking if the Botox medication is there already? Patient is asking is there anything that she needs to do?      Please assist.

## 2025-03-05 NOTE — TELEPHONE ENCOUNTER
1 [id number] 02/27/2025 02/26/2025 Zolpidem Tartrate (Tablet, Extended Release) 30.0 30 6.25 MG NA OLIVER [facility] Commercial Insurance 0 / 0 [place]    1 [id number] 02/13/2025 02/13/2025 Butalbital-acetaminophen-caffeine (Tablet) 30.0 5 325 MG-50 MG-40 MG NA OLIVER [employer] Commercial Insurance 0 / 0 [place]    1 [id number] 12/26/2024 12/09/2024 Zolpidem Tartrate (Tablet, Extended Release) 30.0 30 6.25 MG NA OLIVER [facility] Commercial Insurance 0 / 0 [place]    1 [id number] 12/04/2024 12/03/2024 Butalbital-acetaminophen-caffeine (Tablet) 30.0 5 325 MG-50 MG-40 MG NA OLIVER [employer] [facility] Commercial Insurance 0 / 0 [place]    1 [id number] 12/04/2024 12/03/2024 Zolpidem Tartrate (Tablet, Extended Release) 30.0 30 6.25 MG NA OLIVER [employer] Commercial Insurance 0 / 0 [place]    1 [id number] 10/17/2024 10/14/2024 Butalbital-acetaminophen-caffeine (Tablet) 30.0 5 325 MG-50 MG-40 MG NA OLIVER [employer] [facility] Commercial Insurance 0 / 0 [place]    1 [id number] 10/14/2024 10/14/2024 Zolpidem Tartrate (Tablet, Extended Release) 30.0 30 6.25 MG NA OLIVER [facility] Commercial Insurance 0 / 0 [place]    1 [id number] 08/26/2024 08/26/2024 Zolpidem Tartrate (Tablet, Extended Release) 30.0 30 6.25 MG NA OLIVER [facility] Commercial Insurance 0 / 0 [place]    1 [id number] 07/29/2024 07/29/2024 oxyCODONE HCL (Tablet) 12.0 3 5 MG 30.0 FLORY KASH [facility] Commercial Insurance 0 / 0 [place]    1 [id number] 07/07/2024 07/03/2024 Butalbital-acetaminophen-caffeine (Tablet) 30.0 5 325 MG-50 MG-40 MG NA ALBERTO SHETH POGODZINSKI [facility] Commercial Insurance 0 / 0 [place]

## 2025-03-19 ENCOUNTER — OFFICE VISIT (OUTPATIENT)
Dept: FAMILY MEDICINE CLINIC | Facility: CLINIC | Age: 55
End: 2025-03-19
Payer: COMMERCIAL

## 2025-03-19 ENCOUNTER — PROCEDURE VISIT (OUTPATIENT)
Dept: NEUROLOGY | Facility: CLINIC | Age: 55
End: 2025-03-19
Payer: COMMERCIAL

## 2025-03-19 VITALS
TEMPERATURE: 98 F | SYSTOLIC BLOOD PRESSURE: 110 MMHG | WEIGHT: 138.4 LBS | OXYGEN SATURATION: 100 % | HEIGHT: 65 IN | DIASTOLIC BLOOD PRESSURE: 82 MMHG | HEART RATE: 67 BPM | BODY MASS INDEX: 23.06 KG/M2

## 2025-03-19 VITALS — SYSTOLIC BLOOD PRESSURE: 93 MMHG | DIASTOLIC BLOOD PRESSURE: 65 MMHG | HEART RATE: 77 BPM | TEMPERATURE: 97.9 F

## 2025-03-19 DIAGNOSIS — Z00.00 ANNUAL PHYSICAL EXAM: Primary | ICD-10-CM

## 2025-03-19 DIAGNOSIS — F41.9 ANXIETY: ICD-10-CM

## 2025-03-19 DIAGNOSIS — Z11.1 PPD SCREENING TEST: ICD-10-CM

## 2025-03-19 DIAGNOSIS — G43.709 CHRONIC MIGRAINE WITHOUT AURA WITHOUT STATUS MIGRAINOSUS, NOT INTRACTABLE: Primary | ICD-10-CM

## 2025-03-19 PROCEDURE — 86580 TB INTRADERMAL TEST: CPT | Performed by: FAMILY MEDICINE

## 2025-03-19 PROCEDURE — 64615 CHEMODENERV MUSC MIGRAINE: CPT | Performed by: PHYSICIAN ASSISTANT

## 2025-03-19 PROCEDURE — 99396 PREV VISIT EST AGE 40-64: CPT | Performed by: FAMILY MEDICINE

## 2025-03-19 PROCEDURE — 99213 OFFICE O/P EST LOW 20 MIN: CPT | Performed by: FAMILY MEDICINE

## 2025-03-19 NOTE — PROGRESS NOTES
"Universal Protocol   procedure performed by consultantConsent: Verbal consent obtained. Written consent obtained.  Risks and benefits: risks, benefits and alternatives were discussed  Consent given by: patient  Time out: Immediately prior to procedure a \"time out\" was called to verify the correct patient, procedure, equipment, support staff and site/side marked as required.  Patient understanding: patient states understanding of the procedure being performed  Patient consent: the patient's understanding of the procedure matches consent given  Procedure consent: procedure consent matches procedure scheduled  Relevant documents: relevant documents present and verified  Patient identity confirmed: verbally with patient      Chemodenervation     Date/Time  3/19/2025 2:30 PM     Performed by  Rosmery Geronimo PA-C   Authorized by  Rosmery Geronimo PA-C     Pre-procedure details      Prepped With: Alcohol     Anesthesia  (see MAR for exact dosages):     Anesthesia method:  None   Procedure details      Position:  Upright   Botox      Botox Type:  Type A    Brand:  Botox    mL's of Botulinum Toxin:  200    Final Concentration per CC:  50 units    Needle Gauge:  30 G 2.5 inch   Procedures      Botox Procedures: chronic headache      Indications: migraines     Injection Location      Head / Face:  L superior cervical paraspinal, R superior cervical paraspinal, L , R , L frontalis, R frontalis, L medial occipitalis, R medial occipitalis, procerus, R temporalis, L temporalis, R superior trapezius and L superior trapezius    L  injection amount:  5 unit(s)    R  injection amount:  5 unit(s)    L lateral frontalis:  5 unit(s)    R lateral frontalis:  5 unit(s)    L medial frontalis:  5 unit(s)    R medial frontalis:  5 unit(s)    L temporalis injection amount:  20 unit(s)    R temporalis injection amount:  20 unit(s)    Procerus injection amount:  5 unit(s)    L medial occipitalis injection " amount:  15 unit(s)    R medial occipitalis injection amount:  15 unit(s)    L superior cervical paraspinal injection amount:  10 unit(s)    R superior cervical paraspinal injection amount:  10 unit(s)    L superior trapezius injection amount:  15 unit(s)    R superior trapezius injection amount:  15 unit(s)   Total Units      Total units used:  200    Total units discarded:  0   Post-procedure details      Chemodenervation:  Chronic migraine    Facial Nerve Location::  Bilateral facial nerve    Patient tolerance of procedure:  Tolerated well, no immediate complications   Comments       10 units temporalis bilaterally  5 units lower temporalis bilaterally  15 units occipitalis/cervical paraspinal  All medically necessary

## 2025-03-20 ENCOUNTER — TELEPHONE (OUTPATIENT)
Age: 55
End: 2025-03-20

## 2025-03-20 NOTE — ASSESSMENT & PLAN NOTE
Will set up pt to talk with a counserlor  Orders:    Ambulatory referral to Psych Services; Future

## 2025-03-20 NOTE — TELEPHONE ENCOUNTER
Writer attempted to contact pt regarding referral for [facility] to verify services needed to schedule an appt. Lvm to call writer back.

## 2025-03-20 NOTE — PROGRESS NOTES
Adult Annual Physical  Name: Rosa Elena Piper      : 1970      MRN: [id number]  Encounter Provider: Priscilla Hunt DO  Encounter Date: 3/19/2025   Encounter department: [facility]    Assessment & Plan  Annual physical exam         Anxiety  Will set up pt to talk with a counserlor  Orders:    Ambulatory referral to Psych Services; Future    PPD screening test    Orders:    TB Skin Test      Preventive Screenings:  - Diabetes Screening: screening up-to-date  - Cholesterol Screening: screening up-to-date   - Hepatitis C screening: screening up-to-date   - HIV screening: screening not indicated   - Cervical cancer screening: screening up-to-date   - Breast cancer screening: screening up-to-date and orders placed   - Colon cancer screening: screening up-to-date   - Lung cancer screening: screening not indicated     Immunizations:  - Immunizations due: Zoster (Shingrix)      Depression Screening and Follow-up Plan: Patient was screened for depression during today's encounter. They screened negative with a PHQ-2 score of 1.          History of Present Illness     Adult Annual Physical:  Patient presents for annual physical. The patient presents for her annual physical and needs a TB test due to starting a new job.  She denies any new complaints at this time, other than going through divorce and is requesting to talk with a counselor regarding this.  She intermittently gets anxious but at this point is declining any medication and would rather use talk therapy to control the symptoms.  She denies any dysphoria, but complains of tearfulness and mood swings but no suicidality..     Diet and Physical Activity:  - Diet/Nutrition: portion control.  - Exercise: no formal exercise.    Depression Screening:  - PHQ-2 Score: 1    General Health:  - Sleep: 7-8 hours of sleep on average.  - Hearing: tinnitus.  - Vision: most recent eye exam < 1 year ago.  - Dental: regular dental  "visits.    /GYN Health:  - Follows with GYN: yes.   - Menopause: perimenopausal.   - History of STDs: no  - Contraception: hysterectomy.      Advanced Care Planning:    - Has a durable medical POA?: no      Review of Systems   Constitutional:  Negative for appetite change, chills and fever.   HENT:  Negative for ear pain, facial swelling, rhinorrhea, sinus pain, sore throat and trouble swallowing.    Eyes:  Negative for discharge and redness.   Respiratory:  Negative for chest tightness, shortness of breath and wheezing.    Cardiovascular:  Negative for chest pain and palpitations.   Gastrointestinal:  Negative for abdominal pain, diarrhea, nausea and vomiting.   Endocrine: Negative for polyuria.   Genitourinary:  Negative for dysuria and urgency.   Musculoskeletal:  Negative for arthralgias and back pain.   Skin:  Negative for rash.   Neurological:  Negative for dizziness, weakness and headaches.   Hematological:  Negative for adenopathy.   Psychiatric/Behavioral:  Positive for decreased concentration. Negative for agitation, behavioral problems, confusion, dysphoric mood, hallucinations, self-injury, sleep disturbance and suicidal ideas. The patient is nervous/anxious. The patient is not hyperactive.    All other systems reviewed and are negative.    Pertinent Medical History   Hypothyroidism, hypertension , insomnia and anxiety.        Objective   /82   Pulse 67   Temp 98 °F (36.7 °C)   Ht 5' 5\" (1.651 m)   Wt 62.8 kg (138 lb 6.4 oz)   LMP 11/18/2015   SpO2 100%   BMI 23.03 kg/m²     Physical Exam  Vitals and nursing note reviewed.   Constitutional:       General: She is not in acute distress.     Appearance: Normal appearance. She is well-developed. She is not ill-appearing or diaphoretic.   HENT:      Head: Normocephalic and atraumatic.      Right Ear: Tympanic membrane, ear canal and external ear normal.      Left Ear: Tympanic membrane, ear canal and external ear normal.      Nose: Nose normal. " No congestion or rhinorrhea.      Mouth/Throat:      Mouth: Mucous membranes are moist.      Pharynx: Oropharynx is clear. No oropharyngeal exudate or posterior oropharyngeal erythema.   Eyes:      General: No scleral icterus.        Right eye: No discharge.         Left eye: No discharge.      Extraocular Movements: Extraocular movements intact.      Conjunctiva/sclera: Conjunctivae normal.      Pupils: Pupils are equal, round, and reactive to light.   Neck:      Thyroid: No thyromegaly.      Vascular: No carotid bruit or JVD.      Trachea: No tracheal deviation.   Cardiovascular:      Rate and Rhythm: Normal rate and regular rhythm.      Pulses: Normal pulses.      Heart sounds: Normal heart sounds. No murmur heard.  Pulmonary:      Effort: Pulmonary effort is normal. No respiratory distress.      Breath sounds: Normal breath sounds. No stridor. No wheezing, rhonchi or rales.   Abdominal:      General: Abdomen is flat. Bowel sounds are normal. There is no distension.      Palpations: Abdomen is soft. There is no mass.      Tenderness: There is no abdominal tenderness. There is no guarding or rebound.   Musculoskeletal:         General: No swelling, tenderness or deformity. Normal range of motion.      Cervical back: Normal range of motion and neck supple. No rigidity.      Right lower leg: No edema.      Left lower leg: No edema.   Lymphadenopathy:      Cervical: No cervical adenopathy.   Skin:     General: Skin is warm and dry.      Capillary Refill: Capillary refill takes less than 2 seconds.      Coloration: Skin is not jaundiced.      Findings: No bruising, erythema or rash.   Neurological:      General: No focal deficit present.      Mental Status: She is alert and oriented to person, place, and time.      Cranial Nerves: No cranial nerve deficit.      Sensory: No sensory deficit.      Motor: No abnormal muscle tone.      Coordination: Coordination normal.      Gait: Gait normal.      Deep Tendon Reflexes:  Reflexes are normal and symmetric. Reflexes normal.   Psychiatric:         Mood and Affect: Mood normal.         Behavior: Behavior normal.         Thought Content: Thought content normal.         Judgment: Judgment normal.      Comments: Pt is tearful       Administrative Statements   I have spent a total time of 20 minutes in caring for this patient on the day of the visit/encounter including Diagnostic results, Prognosis, Risks and benefits of tx options, Instructions for management, Patient and family education, Importance of tx compliance, Risk factor reductions, and Impressions.

## 2025-03-20 NOTE — PATIENT INSTRUCTIONS
"Patient Education     Routine physical for adults   The Basics   Written by the doctors and editors at [facility]   What is a physical? -- A physical is a routine visit, or \"check-up,\" with your doctor. You might also hear it called a \"wellness visit\" or \"preventive visit.\"  During each visit, the doctor will:   Ask about your physical and mental health   Ask about your habits, behaviors, and lifestyle   Do an exam   Give you vaccines if needed   Talk to you about any medicines you take   Give advice about your health   Answer your questions  Getting regular check-ups is an important part of taking care of your health. It can help your doctor find and treat any problems you have. But it's also important for preventing health problems.  A routine physical is different from a \"sick visit.\" A sick visit is when you see a doctor because of a health concern or problem. Since physicals are scheduled ahead of time, you can think about what you want to ask the doctor.  How often should I get a physical? -- It depends on your age and health. In general, for people age 21 years and older:   If you are younger than 50 years, you might be able to get a physical every 3 years.   If you are 50 years or older, your doctor might recommend a physical every year.  If you have an ongoing health condition, like diabetes or high blood pressure, your doctor will probably want to see you more often.  What happens during a physical? -- In general, each visit will include:   Physical exam - The doctor or nurse will check your height, weight, heart rate, and blood pressure. They will also look at your eyes and ears. They will ask about how you are feeling and whether you have any symptoms that bother you.   Medicines - It's a good idea to bring a list of all the medicines you take to each doctor visit. Your doctor will talk to you about your medicines and answer any questions. Tell them if you are having any side effects that bother you. You " "should also tell them if you are having trouble paying for any of your medicines.   Habits and behaviors - This includes:   Your diet   Your exercise habits   Whether you smoke, drink alcohol, or use drugs   Whether you are sexually active   Whether you feel safe at home  Your doctor will talk to you about things you can do to improve your health and lower your risk of health problems. They will also offer help and support. For example, if you want to quit smoking, they can give you advice and might prescribe medicines. If you want to improve your diet or get more physical activity, they can help you with this, too.   Lab tests, if needed - The tests you get will depend on your age and situation. For example, your doctor might want to check your:   Cholesterol   Blood sugar   Iron level   Vaccines - The recommended vaccines will depend on your age, health, and what vaccines you already had. Vaccines are very important because they can prevent certain serious or deadly infections.   Discussion of screening - \"Screening\" means checking for diseases or other health problems before they cause symptoms. Your doctor can recommend screening based on your age, risk, and preferences. This might include tests to check for:   Cancer, such as breast, prostate, cervical, ovarian, colorectal, prostate, lung, or skin cancer   Sexually transmitted infections, such as chlamydia and gonorrhea   Mental health conditions like depression and anxiety  Your doctor will talk to you about the different types of screening tests. They can help you decide which screenings to have. They can also explain what the results might mean.   Answering questions - The physical is a good time to ask the doctor or nurse questions about your health. If needed, they can refer you to other doctors or specialists, too.  Adults older than 65 years often need other care, too. As you get older, your doctor will talk to you about:   How to prevent falling at " home   Hearing or vision tests   Memory testing   How to take your medicines safely   Making sure that you have the help and support you need at home  All topics are updated as new evidence becomes available and our peer review process is complete.  This topic retrieved from [employer] on: May 02, 2024.  Topic 023137 Version 1.0  Release: 32.4.3 - C32.122  © 2024 UpToDate, Inc. and/or its affiliates. All rights reserved.  Consumer Information Use and Disclaimer   Disclaimer: This generalized information is a limited summary of diagnosis, treatment, and/or medication information. It is not meant to be comprehensive and should be used as a tool to help the user understand and/or assess potential diagnostic and treatment options. It does NOT include all information about conditions, treatments, medications, side effects, or risks that may apply to a specific patient. It is not intended to be medical advice or a substitute for the medical advice, diagnosis, or treatment of a health care provider based on the health care provider's examination and assessment of a patient's specific and unique circumstances. Patients must speak with a health care provider for complete information about their health, medical questions, and treatment options, including any risks or benefits regarding use of medications. This information does not endorse any treatments or medications as safe, effective, or approved for treating a specific patient. UpToDate, Inc. and its affiliates disclaim any warranty or liability relating to this information or the use thereof.The use of this information is governed by the Terms of Use, available at https://www.woltersJLGOVuwer.com/en/know/clinical-effectiveness-terms. 2024© UpToDate, Inc. and its affiliates and/or licensors. All rights reserved.  Copyright   © 2024 UpToDate, Inc. and/or its affiliates. All rights reserved.

## 2025-03-20 NOTE — TELEPHONE ENCOUNTER
Patient called and requested to make a new pt. Appointment. Writer offered to add her to waitlist, but pt. Declined.

## 2025-03-21 ENCOUNTER — CLINICAL SUPPORT (OUTPATIENT)
Dept: FAMILY MEDICINE CLINIC | Facility: CLINIC | Age: 55
End: 2025-03-21

## 2025-03-21 DIAGNOSIS — Z11.1 PPD SCREENING TEST: Primary | ICD-10-CM

## 2025-03-21 LAB
INDURATION: 0 MM
TB SKIN TEST: NEGATIVE

## 2025-03-31 DIAGNOSIS — G43.109 MIGRAINE WITH AURA AND WITHOUT STATUS MIGRAINOSUS, NOT INTRACTABLE: ICD-10-CM

## 2025-03-31 NOTE — TELEPHONE ENCOUNTER
Reason for call:   [x] Refill-[facility] in [facility]. Only has 3 doses left. Requesting transfer back to [facility]  [] Prior Auth  [] Other:     Office:   [] PCP/Provider -   [x] Specialty/Provider - [facility]  Authorized By: Rosmery Geronimo PA-C    Medication: Ubrogepant (UBRELVY) 100 MG tablet     Dose/Frequency:  Take 1 tablet (100 mg) one time as needed for migraine. May repeat one additional tablet (100 mg) at least two hours after the first dose. Do not use more than two doses per day (200mg)     Quantity: 16    Pharmacy: [facility]/pharmacy #[id number] [facility] [address] [facility]     Local Pharmacy   Does the patient have enough for 3 days?   [] Yes   [x] No - Send as HP to POD    Mail Away Pharmacy   Does the patient have enough for 10 days?   [] Yes   [] No - Send as HP to POD

## 2025-04-03 DIAGNOSIS — G43.709 CHRONIC MIGRAINE WITHOUT AURA WITHOUT STATUS MIGRAINOSUS, NOT INTRACTABLE: ICD-10-CM

## 2025-04-03 RX ORDER — ZONISAMIDE 100 MG/1
100 CAPSULE ORAL
Qty: 90 CAPSULE | Refills: 1 | Status: SHIPPED | OUTPATIENT
Start: 2025-04-03

## 2025-04-14 DIAGNOSIS — G43.809 OTHER MIGRAINE WITHOUT STATUS MIGRAINOSUS, NOT INTRACTABLE: ICD-10-CM

## 2025-04-14 RX ORDER — BUTALBITAL, ACETAMINOPHEN AND CAFFEINE 50; 325; 40 MG/1; MG/1; MG/1
1 TABLET ORAL EVERY 4 HOURS PRN
Qty: 30 TABLET | Refills: 0 | Status: SHIPPED | OUTPATIENT
Start: 2025-04-14

## 2025-04-14 NOTE — TELEPHONE ENCOUNTER
1 [id number] 03/06/2025 03/05/2025 Zolpidem Tartrate (Tablet, Extended Release) 30.0 30 6.25 MG NA [facility] [employer] 0 / 0 [place]    1 [id number] 02/27/2025 02/26/2025 Zolpidem Tartrate (Tablet, Extended Release) 30.0 30 6.25 MG NA OLIVER [facility]T [employer] Commercial [employer] 0 / 0 [place]    1 [id number] 02/13/2025 02/13/2025 Butalbital-acetaminophen-caffeine (Tablet) 30.0 5 325 MG-50 MG-40 MG NA [facility] Commercial Insurance 0 / 0 [place]    1 [id number] 12/26/2024 12/09/2024 Zolpidem Tartrate (Tablet, Extended Release) 30.0 30 6.25 MG NA [facility] Commercial Insurance 0 / 0 [place]    1 [id number] 12/04/2024 12/03/2024 Butalbital-acetaminophen-caffeine (Tablet) 30.0 5 325 MG-50 MG-40 MG NA [facility] Commercial Insurance 0 / 0 [place]    1 [id number] 12/04/2024 12/03/2024 Zolpidem Tartrate (Tablet, Extended Release) 30.0 30 6.25 MG NA OLIVER BROADT [employer] 0 / 0 [place]    1 [id number] 10/17/2024 10/14/2024 Butalbital-acetaminophen-caffeine (Tablet) 30.0 5 325 MG-50 MG-40 MG NA OL[facility] [employer] [facility] Commercial Insurance 0 / 0 [place]    1 [id number] 10/14/2024 10/14/2024 Zolpidem Tartrate (Tablet, Extended Release) 30.0 30 6.25 MG NA [facility] Commercial Insurance 0 / 0 [place]    1 [id number] 08/26/2024 08/26/2024 Zolpidem Tartrate (Tablet, Extended Release) 30.0 30 6.25 MG NA [facility] Commercial Insurance 0 / 0 [place]    1 [id number] 07/29/2024 07/29/2024 oxyCODONE HCL (Tablet) 12.0 3 5 MG 30.0 FLORY HEWITT [facility] Commercial Ins

## 2025-04-27 DIAGNOSIS — G43.709 CHRONIC MIGRAINE WITHOUT AURA WITHOUT STATUS MIGRAINOSUS, NOT INTRACTABLE: ICD-10-CM

## 2025-04-28 RX ORDER — GALCANEZUMAB 120 MG/ML
1 INJECTION, SOLUTION SUBCUTANEOUS
Qty: 3 ML | Refills: 3 | Status: SHIPPED | OUTPATIENT
Start: 2025-04-28

## 2025-04-29 ENCOUNTER — TELEPHONE (OUTPATIENT)
Age: 55
End: 2025-04-29

## 2025-04-30 DIAGNOSIS — G43.709 CHRONIC MIGRAINE WITHOUT AURA WITHOUT STATUS MIGRAINOSUS, NOT INTRACTABLE: ICD-10-CM

## 2025-05-01 RX ORDER — GALCANEZUMAB 120 MG/ML
1 INJECTION, SOLUTION SUBCUTANEOUS
Qty: 3 ML | Refills: 0 | OUTPATIENT
Start: 2025-05-01

## 2025-05-05 NOTE — TELEPHONE ENCOUNTER
[employer] [place] approved through 4/29/26    Approval letter faxed to [facility] at  [phone]

## 2025-05-14 ENCOUNTER — TELEPHONE (OUTPATIENT)
Dept: NEUROLOGY | Facility: CLINIC | Age: 55
End: 2025-05-14

## 2025-05-24 DIAGNOSIS — G43.809 OTHER MIGRAINE WITHOUT STATUS MIGRAINOSUS, NOT INTRACTABLE: ICD-10-CM

## 2025-05-24 DIAGNOSIS — G47.00 INSOMNIA, UNSPECIFIED TYPE: ICD-10-CM

## 2025-05-27 RX ORDER — BUTALBITAL, ACETAMINOPHEN AND CAFFEINE 50; 325; 40 MG/1; MG/1; MG/1
1 TABLET ORAL EVERY 4 HOURS PRN
Qty: 30 TABLET | Refills: 0 | Status: SHIPPED | OUTPATIENT
Start: 2025-05-27

## 2025-05-27 RX ORDER — ZOLPIDEM TARTRATE 6.25 MG/1
6.25 TABLET, FILM COATED, EXTENDED RELEASE ORAL
Qty: 30 TABLET | Refills: 0 | Status: SHIPPED | OUTPATIENT
Start: 2025-05-27

## 2025-05-29 DIAGNOSIS — G43.709 CHRONIC MIGRAINE WITHOUT AURA WITHOUT STATUS MIGRAINOSUS, NOT INTRACTABLE: ICD-10-CM

## 2025-05-29 RX ORDER — ONABOTULINUMTOXINA 200 [USP'U]/1
200 INJECTION, POWDER, LYOPHILIZED, FOR SOLUTION INTRADERMAL; INTRAMUSCULAR
Qty: 1 EACH | Refills: 3 | Status: SHIPPED | OUTPATIENT
Start: 2025-05-29

## 2025-05-29 NOTE — TELEPHONE ENCOUNTER
Good morning,     Please review attached request for Botox 200 units and authorize refill if you agree.     Lillie Ibanez/yosi  05/29/25  8:22 AM

## 2025-06-13 ENCOUNTER — TELEPHONE (OUTPATIENT)
Age: 55
End: 2025-06-13

## 2025-06-25 ENCOUNTER — TELEPHONE (OUTPATIENT)
Dept: NEUROLOGY | Facility: CLINIC | Age: 55
End: 2025-06-25

## 2025-06-25 NOTE — TELEPHONE ENCOUNTER
Botox number of units: 200  Botox quantity: 1  Arrived at what location: CV  Botox at Correct Administering Location: yes  NDC number: 0538-9309-53  Lot number: [id number]  Expiration Date: 11/2027  Appt notes indicate correct medication: yes

## 2025-06-26 ENCOUNTER — PROCEDURE VISIT (OUTPATIENT)
Dept: NEUROLOGY | Facility: CLINIC | Age: 55
End: 2025-06-26
Payer: COMMERCIAL

## 2025-06-26 VITALS — TEMPERATURE: 97.6 F | DIASTOLIC BLOOD PRESSURE: 86 MMHG | HEART RATE: 74 BPM | SYSTOLIC BLOOD PRESSURE: 116 MMHG

## 2025-06-26 DIAGNOSIS — G43.709 CHRONIC MIGRAINE WITHOUT AURA WITHOUT STATUS MIGRAINOSUS, NOT INTRACTABLE: Primary | ICD-10-CM

## 2025-06-26 PROCEDURE — 64615 CHEMODENERV MUSC MIGRAINE: CPT | Performed by: PHYSICIAN ASSISTANT

## 2025-06-26 NOTE — PROGRESS NOTES
"Universal Protocol   procedure performed by consultantConsent: Verbal consent obtained. Written consent obtained  Risks and benefits: risks, benefits and alternatives were discussed  Consent given by: patient  Time out: Immediately prior to procedure a \"time out\" was called to verify the correct patient, procedure, equipment, support staff and site/side marked as required.  Patient understanding: patient states understanding of the procedure being performed  Patient consent: the patient's understanding of the procedure matches consent given  Procedure consent: procedure consent matches procedure scheduled  Relevant documents: relevant documents present and verified  Patient identity confirmed: verbally with patient      Chemodenervation     Date/Time  6/26/2025 3:00 PM     Performed by  Rosmery Geronimo PA-C   Authorized by  Rosmery Geronimo PA-C     Pre-procedure details      Prepped With: Alcohol     Anesthesia  (see MAR for exact dosages):     Anesthesia method:  None   Procedure details      Position:  Upright   Botox      Botox Type:  Type A    Brand:  Botox    mL's of Botulinum Toxin:  200    Final Concentration per CC:  50 units    Needle Gauge:  30 G 2.5 inch   Procedures      Botox Procedures: chronic headache      Indications: migraines     Injection Location      Head / Face:  L superior cervical paraspinal, R superior cervical paraspinal, L , R , L frontalis, R frontalis, L medial occipitalis, R medial occipitalis, procerus, R temporalis, L temporalis, R superior trapezius and L superior trapezius    L  injection amount:  5 unit(s)    R  injection amount:  5 unit(s)    L lateral frontalis:  5 unit(s)    R lateral frontalis:  5 unit(s)    L medial frontalis:  5 unit(s)    R medial frontalis:  5 unit(s)    L temporalis injection amount:  20 unit(s)    R temporalis injection amount:  20 unit(s)    Procerus injection amount:  5 unit(s)    L medial occipitalis injection " amount:  15 unit(s)    R medial occipitalis injection amount:  15 unit(s)    L superior cervical paraspinal injection amount:  10 unit(s)    R superior cervical paraspinal injection amount:  10 unit(s)    L superior trapezius injection amount:  15 unit(s)    R superior trapezius injection amount:  15 unit(s)   Total Units      Total units used:  200    Total units discarded:  0   Post-procedure details      Chemodenervation:  Chronic migraine    Facial Nerve Location::  Bilateral facial nerve    Patient tolerance of procedure:  Tolerated well, no immediate complications   Comments       10 units temporalis bilaterally  5 units lower temporalis bilaterally  15 units occipitalis/cervical paraspinal  All medically necessary

## 2025-07-12 DIAGNOSIS — G43.109 MIGRAINE WITH AURA AND WITHOUT STATUS MIGRAINOSUS, NOT INTRACTABLE: ICD-10-CM

## 2025-07-27 DIAGNOSIS — G47.00 INSOMNIA, UNSPECIFIED TYPE: ICD-10-CM

## 2025-07-28 RX ORDER — ZOLPIDEM TARTRATE 6.25 MG/1
6.25 TABLET, FILM COATED, EXTENDED RELEASE ORAL
Qty: 30 TABLET | Refills: 0 | Status: SHIPPED | OUTPATIENT
Start: 2025-07-28

## (undated) DEVICE — INTENDED FOR TISSUE SEPARATION, AND OTHER PROCEDURES THAT REQUIRE A SHARP SURGICAL BLADE TO PUNCTURE OR CUT.: Brand: BARD-PARKER SAFETY BLADES SIZE 15, STERILE

## (undated) DEVICE — GLOVE INDICATOR PI UNDERGLOVE SZ 6.5 BLUE

## (undated) DEVICE — SUT MONOCRYL 4-0 PS-2 18 IN Y496G

## (undated) DEVICE — ENDOSCOPIC VALVE WITH ADAPTER.: Brand: SURSEAL® II

## (undated) DEVICE — GLOVE INDICATOR PI UNDERGLOVE SZ 7 BLUE

## (undated) DEVICE — CATH URETERAL 5FR X 70 CM FLEX TIP POLYUR BARD

## (undated) DEVICE — SINGLE-USE DIGITAL FLEXIBLE URETEROSCOPE: Brand: APTRA

## (undated) DEVICE — PACK TUR

## (undated) DEVICE — GLOVE INDICATOR PI UNDERGLOVE SZ 8 BLUE

## (undated) DEVICE — UROLOGIC DRAIN BAG: Brand: UNBRANDED

## (undated) DEVICE — GLOVE SRG BIOGEL 6.5

## (undated) DEVICE — GLOVE SRG BIOGEL 7

## (undated) DEVICE — PACK PLASTIC HAND PBDS

## (undated) DEVICE — GLOVE SRG BIOGEL 7.5

## (undated) DEVICE — ADHESIVE SKN CLSR HISTOACRYL FLEX 0.5ML LF

## (undated) DEVICE — BSKT SKYLITE STONE RETRV 3FR 120CM 4WIRE TIPLESS

## (undated) DEVICE — CHLORAPREP HI-LITE 26ML ORANGE

## (undated) DEVICE — Device

## (undated) DEVICE — CATH URET .038 10FR 50CM DUAL LUMEN

## (undated) DEVICE — SUT PROLENE 4-0 PC-3 18 IN 8634G

## (undated) DEVICE — GUIDEWIRE STRGHT TIP 0.035 IN  SOLO PLUS

## (undated) DEVICE — SPECIMEN CONTAINER STERILE PEEL PACK

## (undated) DEVICE — INVIEW CLEAR LEGGINGS: Brand: CONVERTORS